# Patient Record
Sex: FEMALE | Race: WHITE | NOT HISPANIC OR LATINO | Employment: UNEMPLOYED | URBAN - METROPOLITAN AREA
[De-identification: names, ages, dates, MRNs, and addresses within clinical notes are randomized per-mention and may not be internally consistent; named-entity substitution may affect disease eponyms.]

---

## 2023-03-14 DIAGNOSIS — Z87.898 HISTORY OF NAUSEA: ICD-10-CM

## 2023-03-14 RX ORDER — PROMETHAZINE HYDROCHLORIDE 25 MG/1
25 TABLET ORAL EVERY 8 HOURS PRN
COMMUNITY
End: 2023-03-14 | Stop reason: SDUPTHER

## 2023-03-14 RX ORDER — PROMETHAZINE HYDROCHLORIDE 25 MG/1
25 TABLET ORAL EVERY 8 HOURS PRN
Qty: 90 TABLET | Refills: 1 | Status: SHIPPED | OUTPATIENT
Start: 2023-03-14 | End: 2023-09-19 | Stop reason: SDUPTHER

## 2023-03-16 DIAGNOSIS — E89.0 HYPOTHYROIDISM, POSTSURGICAL: ICD-10-CM

## 2023-03-16 RX ORDER — LEVOTHYROXINE SODIUM 137 UG/1
137 TABLET ORAL DAILY
Qty: 90 TABLET | Refills: 1 | Status: SHIPPED | OUTPATIENT
Start: 2023-03-16 | End: 2023-09-18 | Stop reason: SDUPTHER

## 2023-03-16 RX ORDER — LEVOTHYROXINE SODIUM 137 UG/1
137 TABLET ORAL DAILY
COMMUNITY
End: 2023-03-16 | Stop reason: SDUPTHER

## 2023-04-04 DIAGNOSIS — G43.709 CHRONIC MIGRAINE WITHOUT AURA WITHOUT STATUS MIGRAINOSUS, NOT INTRACTABLE: ICD-10-CM

## 2023-04-04 RX ORDER — INDOMETHACIN 25 MG/1
25 CAPSULE ORAL
Qty: 60 CAPSULE | Refills: 1 | Status: SHIPPED | OUTPATIENT
Start: 2023-04-04 | End: 2023-05-04

## 2023-04-04 RX ORDER — INDOMETHACIN 25 MG/1
25 CAPSULE ORAL
COMMUNITY
Start: 2023-03-08 | End: 2023-04-04 | Stop reason: SDUPTHER

## 2023-04-13 LAB — THYROTROPIN (MIU/L) IN SER/PLAS BY DETECTION LIMIT <= 0.05 MIU/L: 2.25 MIU/L (ref 0.44–3.98)

## 2023-05-17 DIAGNOSIS — Z87.898 HISTORY OF NAUSEA: ICD-10-CM

## 2023-05-17 RX ORDER — PROMETHAZINE HYDROCHLORIDE 25 MG/1
25 TABLET ORAL EVERY 8 HOURS PRN
COMMUNITY
Start: 2023-04-17 | End: 2023-05-17 | Stop reason: SDUPTHER

## 2023-05-17 RX ORDER — PROMETHAZINE HYDROCHLORIDE 25 MG/1
25 TABLET ORAL EVERY 8 HOURS PRN
Qty: 45 TABLET | Refills: 1 | Status: SHIPPED | OUTPATIENT
Start: 2023-05-17 | End: 2023-06-26 | Stop reason: SDUPTHER

## 2023-06-01 DIAGNOSIS — G89.29 CHRONIC NONINTRACTABLE HEADACHE, UNSPECIFIED HEADACHE TYPE: ICD-10-CM

## 2023-06-01 DIAGNOSIS — R51.9 CHRONIC NONINTRACTABLE HEADACHE, UNSPECIFIED HEADACHE TYPE: ICD-10-CM

## 2023-06-01 RX ORDER — INDOMETHACIN 25 MG/1
25 CAPSULE ORAL
Qty: 60 CAPSULE | Refills: 0 | Status: SHIPPED | OUTPATIENT
Start: 2023-06-01 | End: 2023-07-01

## 2023-06-07 ENCOUNTER — OFFICE VISIT (OUTPATIENT)
Dept: PRIMARY CARE | Facility: CLINIC | Age: 38
End: 2023-06-07
Payer: COMMERCIAL

## 2023-06-07 VITALS
HEIGHT: 63 IN | HEART RATE: 91 BPM | OXYGEN SATURATION: 98 % | WEIGHT: 197.6 LBS | BODY MASS INDEX: 35.01 KG/M2 | SYSTOLIC BLOOD PRESSURE: 116 MMHG | DIASTOLIC BLOOD PRESSURE: 76 MMHG | RESPIRATION RATE: 18 BRPM

## 2023-06-07 DIAGNOSIS — M32.9 SLE EXACERBATION (MULTI): ICD-10-CM

## 2023-06-07 DIAGNOSIS — M32.9: ICD-10-CM

## 2023-06-07 DIAGNOSIS — G43.709 CHRONIC MIGRAINE WITHOUT AURA WITHOUT STATUS MIGRAINOSUS, NOT INTRACTABLE: Primary | ICD-10-CM

## 2023-06-07 PROBLEM — M79.10 MYALGIA: Status: ACTIVE | Noted: 2023-06-07

## 2023-06-07 PROBLEM — R42 DIZZINESS: Status: RESOLVED | Noted: 2023-06-07 | Resolved: 2023-06-07

## 2023-06-07 PROBLEM — M79.89 FOOT SWELLING: Status: RESOLVED | Noted: 2023-06-07 | Resolved: 2023-06-07

## 2023-06-07 PROBLEM — C73 PAPILLARY MICROCARCINOMA OF THYROID (MULTI): Status: ACTIVE | Noted: 2023-06-07

## 2023-06-07 PROBLEM — F32.A DEPRESSION: Status: ACTIVE | Noted: 2023-06-07

## 2023-06-07 PROBLEM — R06.02 EXERTIONAL SHORTNESS OF BREATH: Status: RESOLVED | Noted: 2023-06-07 | Resolved: 2023-06-07

## 2023-06-07 PROBLEM — Z98.890 HISTORY OF THYROID SURGERY: Status: RESOLVED | Noted: 2023-06-07 | Resolved: 2023-06-07

## 2023-06-07 PROBLEM — F41.9 ANXIETY: Status: ACTIVE | Noted: 2023-06-07

## 2023-06-07 PROBLEM — E89.0 HYPOTHYROIDISM, POSTSURGICAL: Status: ACTIVE | Noted: 2023-06-07

## 2023-06-07 PROBLEM — G47.9 SLEEP DISORDER: Status: RESOLVED | Noted: 2023-06-07 | Resolved: 2023-06-07

## 2023-06-07 PROBLEM — N64.4 MASTODYNIA: Status: RESOLVED | Noted: 2023-06-07 | Resolved: 2023-06-07

## 2023-06-07 PROBLEM — R53.83 FATIGUE: Status: RESOLVED | Noted: 2023-06-07 | Resolved: 2023-06-07

## 2023-06-07 PROBLEM — R05.9 COUGH: Status: RESOLVED | Noted: 2023-06-07 | Resolved: 2023-06-07

## 2023-06-07 PROBLEM — M79.89 SWELLING OF BOTH HANDS: Status: RESOLVED | Noted: 2023-06-07 | Resolved: 2023-06-07

## 2023-06-07 PROBLEM — R00.2 PALPITATIONS: Status: ACTIVE | Noted: 2023-06-07

## 2023-06-07 PROCEDURE — 99214 OFFICE O/P EST MOD 30 MIN: CPT | Performed by: NURSE PRACTITIONER

## 2023-06-07 RX ORDER — ALBUTEROL SULFATE 90 UG/1
2 AEROSOL, METERED RESPIRATORY (INHALATION) EVERY 6 HOURS PRN
COMMUNITY
Start: 2014-09-29 | End: 2024-01-02 | Stop reason: ALTCHOICE

## 2023-06-07 RX ORDER — ESCITALOPRAM OXALATE 20 MG/1
20 TABLET ORAL DAILY
COMMUNITY
Start: 2023-06-02 | End: 2023-08-30 | Stop reason: SDUPTHER

## 2023-06-07 RX ORDER — METOPROLOL SUCCINATE 25 MG/1
25 TABLET, EXTENDED RELEASE ORAL DAILY
COMMUNITY
End: 2023-06-15 | Stop reason: SDUPTHER

## 2023-06-07 RX ORDER — FAMOTIDINE 20 MG/1
20 TABLET, FILM COATED ORAL 2 TIMES DAILY
COMMUNITY
End: 2023-07-20 | Stop reason: SDUPTHER

## 2023-06-07 RX ORDER — HYDROXYZINE HYDROCHLORIDE 25 MG/1
1 TABLET, FILM COATED ORAL
COMMUNITY
Start: 2022-11-08 | End: 2024-01-22 | Stop reason: WASHOUT

## 2023-06-07 RX ORDER — IBUPROFEN 800 MG/1
1 TABLET ORAL EVERY 6 HOURS PRN
COMMUNITY
Start: 2023-02-09 | End: 2024-01-02 | Stop reason: ALTCHOICE

## 2023-06-07 RX ORDER — FREMANEZUMAB-VFRM 225 MG/1.5ML
225 INJECTION SUBCUTANEOUS
COMMUNITY
Start: 2023-06-06

## 2023-06-07 RX ORDER — HYDROCODONE BITARTRATE AND ACETAMINOPHEN 5; 325 MG/1; MG/1
1 TABLET ORAL EVERY 6 HOURS PRN
Qty: 12 TABLET | Refills: 0 | Status: SHIPPED | OUTPATIENT
Start: 2023-06-07 | End: 2023-06-10

## 2023-06-07 RX ORDER — PNV NO.95/FERROUS FUM/FOLIC AC 28MG-0.8MG
100 TABLET ORAL DAILY
COMMUNITY

## 2023-06-07 RX ORDER — TIZANIDINE 2 MG/1
TABLET ORAL
COMMUNITY
Start: 2023-05-31 | End: 2024-01-22 | Stop reason: WASHOUT

## 2023-06-07 RX ORDER — MELATONIN 10 MG
1 CAPSULE ORAL NIGHTLY
COMMUNITY

## 2023-06-07 RX ORDER — CALCITRIOL 0.25 UG/1
0.25 CAPSULE ORAL DAILY
COMMUNITY
End: 2024-01-02 | Stop reason: ALTCHOICE

## 2023-06-07 RX ORDER — PREDNISONE 10 MG/1
TABLET ORAL
Qty: 42 TABLET | Refills: 0 | Status: SHIPPED | OUTPATIENT
Start: 2023-06-07 | End: 2023-06-19

## 2023-06-07 ASSESSMENT — ENCOUNTER SYMPTOMS: PAIN: 1

## 2023-06-07 NOTE — PATIENT INSTRUCTIONS
Take the steroids for your flare up    Follow up with rheumatology to discuss treatment options    I [facility] to see if we can get it covered - may have to go through your neurologist     I sent in a small supply of norco to help with your pain - you have taken this prior

## 2023-06-07 NOTE — ASSESSMENT & PLAN NOTE
Following neurology  Continue Mando  Sent in refill for Nurtec but I did discuss with her she may need to contact her neurologist as she is the one who manages this and she may need prior approval to have it covered

## 2023-06-07 NOTE — ASSESSMENT & PLAN NOTE
Current exacerbation  Prednisone burst taper pack  Agreed to give patient a very small 3-day supply of Norco to help with her pain  OARRS reviewed  Norco 1 tab every 6 hours as needed for pain - 3 day supply sent in

## 2023-06-07 NOTE — PROGRESS NOTES
"Subjective   Patient ID: Zuleyma Farrell is a 38 y.o. female who presents for Pain (Patient in today to discuss pain; states that she has an appointment with Rheumatology in July to discuss auto immune disorder, but would like to discuss treatment options for the time being.  ).    Has an appt. With rheumatology in July for her pain and SLE    Has had an increase in back pain due to her lupus - has  a current flare now.  She has not been sleeping.  She states the pain does not change whether she stands, sits, or lays down.  She states the pain is in her lower back and lower cervical region.  She has tried a heating pad and indomethacin without relief she denies changes in bowel or bladder habits or numbness and tingling in bilateral lower extremities    Has been following [facility] and will be for her migraines.  She is taking Ajovy which has helped decrease her migraines greatly.  She will need to take a Nurtec approximately 2-3 times a month for her breakthrough migraine and would like a refill    Takes metoprolol once daily for her tachycardia - will need refills and will need refills in the next month         Review of Systems   All other systems reviewed and are negative.      Objective   /76   Pulse 91   Resp 18   Ht 1.6 m (5' 3\")   Wt 89.6 kg (197 lb 9.6 oz)   SpO2 98%   BMI 35.00 kg/m²     Physical Exam  Vitals and nursing note reviewed.   Constitutional:       General: She is not in acute distress.     Appearance: Normal appearance.   HENT:      Head: Normocephalic and atraumatic.      Right Ear: External ear normal.      Left Ear: External ear normal.      Nose: Nose normal.      Mouth/Throat:      Mouth: Mucous membranes are moist.      Pharynx: Oropharynx is clear.   Eyes:      Extraocular Movements: Extraocular movements intact.      Conjunctiva/sclera: Conjunctivae normal.      Pupils: Pupils are equal, round, and reactive to light.   Pulmonary:      Effort: Pulmonary effort " is normal.      Breath sounds: Normal breath sounds.   Musculoskeletal:         General: Tenderness (Lower cervical and lower lumbar regions of tenderness on palpation) present.      Right lower leg: No edema.      Left lower leg: No edema.   Skin:     General: Skin is warm and dry.   Neurological:      General: No focal deficit present.      Mental Status: She is alert and oriented to person, place, and time.   Psychiatric:         Mood and Affect: Mood normal.         Behavior: Behavior normal.         Thought Content: Thought content normal.         Judgment: Judgment normal.         Assessment/Plan   Problem List Items Addressed This Visit          Other    Subacute systemic lupus erythematosus     Will establish with rheumatology in July to discuss medication management         Chronic migraine without aura without status migrainosus, not intractable - Primary     Following neurology  Continue Mando  Sent in refill for Nurtec but I did discuss with her she may need to contact her neurologist as she is the one who manages this and she may need prior approval to have it covered         Relevant Medications    rimegepant (NURTEC) 75 mg tablet,disintegrating    SLE exacerbation (CMS/HCC)     Current exacerbation  Prednisone burst taper pack  Agreed to give patient a very small 3-day supply of Norco to help with her pain  OARRS reviewed  Norco 1 tab every 6 hours as needed for pain - 3 day supply sent in         Relevant Medications    predniSONE (Deltasone) 10 mg tablet    HYDROcodone-acetaminophen (Norco) 5-325 mg tablet

## 2023-06-15 DIAGNOSIS — R00.2 PALPITATIONS: ICD-10-CM

## 2023-06-15 RX ORDER — METOPROLOL SUCCINATE 25 MG/1
25 TABLET, EXTENDED RELEASE ORAL DAILY
Qty: 90 TABLET | Refills: 0 | Status: SHIPPED | OUTPATIENT
Start: 2023-06-15 | End: 2023-09-18 | Stop reason: SDUPTHER

## 2023-06-26 ENCOUNTER — TELEPHONE (OUTPATIENT)
Dept: PRIMARY CARE | Facility: CLINIC | Age: 38
End: 2023-06-26
Payer: COMMERCIAL

## 2023-06-26 DIAGNOSIS — Z87.898 HISTORY OF NAUSEA: ICD-10-CM

## 2023-06-26 RX ORDER — PROMETHAZINE HYDROCHLORIDE 25 MG/1
25 TABLET ORAL EVERY 8 HOURS PRN
Qty: 45 TABLET | Refills: 1 | Status: SHIPPED | OUTPATIENT
Start: 2023-06-26 | End: 2023-07-11

## 2023-07-20 DIAGNOSIS — Z87.898 HX OF ABDOMINAL PAIN: ICD-10-CM

## 2023-07-20 RX ORDER — FAMOTIDINE 20 MG/1
20 TABLET, FILM COATED ORAL 2 TIMES DAILY
Qty: 180 TABLET | Refills: 1 | Status: SHIPPED | OUTPATIENT
Start: 2023-07-20 | End: 2024-01-02 | Stop reason: SDUPTHER

## 2023-08-09 DIAGNOSIS — Z87.898 HISTORY OF NAUSEA: ICD-10-CM

## 2023-08-09 RX ORDER — PROMETHAZINE HYDROCHLORIDE 25 MG/1
25 TABLET ORAL
COMMUNITY
Start: 2023-07-15 | End: 2023-08-09 | Stop reason: SDUPTHER

## 2023-08-09 RX ORDER — PROMETHAZINE HYDROCHLORIDE 25 MG/1
25 TABLET ORAL EVERY 8 HOURS PRN
Qty: 90 TABLET | Refills: 0 | Status: SHIPPED | OUTPATIENT
Start: 2023-08-09 | End: 2023-09-08

## 2023-08-30 DIAGNOSIS — F41.9 ANXIETY: ICD-10-CM

## 2023-08-30 RX ORDER — ESCITALOPRAM OXALATE 20 MG/1
20 TABLET ORAL DAILY
Qty: 90 TABLET | Refills: 0 | Status: SHIPPED | OUTPATIENT
Start: 2023-08-30 | End: 2023-12-11 | Stop reason: SDUPTHER

## 2023-09-18 DIAGNOSIS — R00.2 PALPITATIONS: ICD-10-CM

## 2023-09-18 DIAGNOSIS — E89.0 HYPOTHYROIDISM, POSTSURGICAL: ICD-10-CM

## 2023-09-18 RX ORDER — METOPROLOL SUCCINATE 25 MG/1
25 TABLET, EXTENDED RELEASE ORAL DAILY
Qty: 90 TABLET | Refills: 0 | Status: SHIPPED | OUTPATIENT
Start: 2023-09-18 | End: 2023-12-11 | Stop reason: SDUPTHER

## 2023-09-18 RX ORDER — LEVOTHYROXINE SODIUM 137 UG/1
137 TABLET ORAL DAILY
Qty: 90 TABLET | Refills: 0 | Status: SHIPPED | OUTPATIENT
Start: 2023-09-18 | End: 2023-12-12 | Stop reason: SDUPTHER

## 2023-09-19 DIAGNOSIS — Z87.898 HISTORY OF NAUSEA: ICD-10-CM

## 2023-09-19 RX ORDER — PROMETHAZINE HYDROCHLORIDE 25 MG/1
25 TABLET ORAL EVERY 8 HOURS PRN
Qty: 90 TABLET | Refills: 1 | Status: SHIPPED | OUTPATIENT
Start: 2023-09-19 | End: 2023-11-14 | Stop reason: SDUPTHER

## 2023-10-05 ENCOUNTER — TELEPHONE (OUTPATIENT)
Dept: RHEUMATOLOGY | Facility: CLINIC | Age: 38
End: 2023-10-05
Payer: COMMERCIAL

## 2023-10-05 DIAGNOSIS — B37.0 THRUSH: Primary | ICD-10-CM

## 2023-10-05 NOTE — TELEPHONE ENCOUNTER
"PT CALLED, STATES SHE STARTED methotrexate ON FRIDAY & NOW BELIEVES SHE HAS THRUSH. ASKING FOR \"SWISH STUFF\"  "

## 2023-10-06 RX ORDER — FLUCONAZOLE 150 MG/1
150 TABLET ORAL
Qty: 12 TABLET | Refills: 2 | Status: SHIPPED | OUTPATIENT
Start: 2023-10-06 | End: 2023-11-05

## 2023-10-06 NOTE — TELEPHONE ENCOUNTER
Hold methotrexate for 1 week, take diflucan as directed until thrush is resolved and then resume methotrexate. Continue folic acid.

## 2023-10-11 ENCOUNTER — TELEPHONE (OUTPATIENT)
Dept: RHEUMATOLOGY | Facility: CLINIC | Age: 38
End: 2023-10-11
Payer: COMMERCIAL

## 2023-10-11 NOTE — TELEPHONE ENCOUNTER
"PT LEFT VM, STATES YOU ORDERED VIT D WEEKLY & FOLIC ACID DAILY. PCP ALSO ORDERED VIT B. NOW PT ASKING IF SHE CAN TAKE  A MULTIVITAMIN, OR STOP OTHER MEDS, OR \"FORGET ABOUT\" MULTIVITAMIN. PLEASE ADVISE  "

## 2023-10-21 ENCOUNTER — APPOINTMENT (OUTPATIENT)
Dept: RADIOLOGY | Facility: HOSPITAL | Age: 38
End: 2023-10-21
Payer: COMMERCIAL

## 2023-10-21 ENCOUNTER — HOSPITAL ENCOUNTER (EMERGENCY)
Facility: HOSPITAL | Age: 38
Discharge: HOME | End: 2023-10-21
Attending: STUDENT IN AN ORGANIZED HEALTH CARE EDUCATION/TRAINING PROGRAM
Payer: COMMERCIAL

## 2023-10-21 ENCOUNTER — HOSPITAL ENCOUNTER (EMERGENCY)
Facility: HOSPITAL | Age: 38
Discharge: HOME | End: 2023-10-21
Attending: EMERGENCY MEDICINE
Payer: COMMERCIAL

## 2023-10-21 VITALS
RESPIRATION RATE: 18 BRPM | SYSTOLIC BLOOD PRESSURE: 171 MMHG | TEMPERATURE: 98.1 F | OXYGEN SATURATION: 98 % | HEIGHT: 63 IN | DIASTOLIC BLOOD PRESSURE: 103 MMHG | WEIGHT: 202.6 LBS | HEART RATE: 101 BPM | BODY MASS INDEX: 35.9 KG/M2

## 2023-10-21 VITALS
WEIGHT: 197.09 LBS | SYSTOLIC BLOOD PRESSURE: 145 MMHG | OXYGEN SATURATION: 98 % | HEIGHT: 63 IN | BODY MASS INDEX: 34.92 KG/M2 | DIASTOLIC BLOOD PRESSURE: 91 MMHG | RESPIRATION RATE: 16 BRPM | HEART RATE: 95 BPM | TEMPERATURE: 97.9 F

## 2023-10-21 DIAGNOSIS — R10.10 PAIN OF UPPER ABDOMEN: Primary | ICD-10-CM

## 2023-10-21 DIAGNOSIS — R10.13 ABDOMINAL PAIN, EPIGASTRIC: Primary | ICD-10-CM

## 2023-10-21 LAB
ALBUMIN SERPL-MCNC: 4.3 G/DL (ref 3.5–5)
ALP BLD-CCNC: 49 U/L (ref 35–125)
ALT SERPL-CCNC: 19 U/L (ref 5–40)
ANION GAP SERPL CALC-SCNC: 10 MMOL/L
APPEARANCE UR: CLEAR
AST SERPL-CCNC: 12 U/L (ref 5–40)
BASOPHILS # BLD AUTO: 0.02 X10*3/UL (ref 0–0.1)
BASOPHILS NFR BLD AUTO: 0.3 %
BILIRUB SERPL-MCNC: 0.2 MG/DL (ref 0.1–1.2)
BILIRUB UR STRIP.AUTO-MCNC: NEGATIVE MG/DL
BUN SERPL-MCNC: 14 MG/DL (ref 8–25)
CALCIUM SERPL-MCNC: 9.7 MG/DL (ref 8.5–10.4)
CHLORIDE SERPL-SCNC: 101 MMOL/L (ref 97–107)
CO2 SERPL-SCNC: 24 MMOL/L (ref 24–31)
COLOR UR: NORMAL
CREAT SERPL-MCNC: 0.7 MG/DL (ref 0.4–1.6)
EOSINOPHIL # BLD AUTO: 0.09 X10*3/UL (ref 0–0.7)
EOSINOPHIL NFR BLD AUTO: 1.2 %
ERYTHROCYTE [DISTWIDTH] IN BLOOD BY AUTOMATED COUNT: 13.8 % (ref 11.5–14.5)
GFR SERPL CREATININE-BSD FRML MDRD: >90 ML/MIN/1.73M*2
GLUCOSE SERPL-MCNC: 103 MG/DL (ref 65–99)
GLUCOSE UR STRIP.AUTO-MCNC: NORMAL MG/DL
HCG UR QL IA.RAPID: NEGATIVE
HCT VFR BLD AUTO: 38.4 % (ref 36–46)
HGB BLD-MCNC: 13.3 G/DL (ref 12–16)
HOLD SPECIMEN: NORMAL
IMM GRANULOCYTES # BLD AUTO: 0.02 X10*3/UL (ref 0–0.7)
IMM GRANULOCYTES NFR BLD AUTO: 0.3 % (ref 0–0.9)
KETONES UR STRIP.AUTO-MCNC: NEGATIVE MG/DL
LEUKOCYTE ESTERASE UR QL STRIP.AUTO: NEGATIVE
LIPASE SERPL-CCNC: 17 U/L (ref 16–63)
LYMPHOCYTES # BLD AUTO: 2.8 X10*3/UL (ref 1.2–4.8)
LYMPHOCYTES NFR BLD AUTO: 38 %
MCH RBC QN AUTO: 33.8 PG (ref 26–34)
MCHC RBC AUTO-ENTMCNC: 34.6 G/DL (ref 32–36)
MCV RBC AUTO: 98 FL (ref 80–100)
MONOCYTES # BLD AUTO: 0.45 X10*3/UL (ref 0.1–1)
MONOCYTES NFR BLD AUTO: 6.1 %
NEUTROPHILS # BLD AUTO: 3.98 X10*3/UL (ref 1.2–7.7)
NEUTROPHILS NFR BLD AUTO: 54.1 %
NITRITE UR QL STRIP.AUTO: NEGATIVE
NRBC BLD-RTO: 0 /100 WBCS (ref 0–0)
PH UR STRIP.AUTO: 6 [PH]
PLATELET # BLD AUTO: 318 X10*3/UL (ref 150–450)
PMV BLD AUTO: 9.4 FL (ref 7.5–11.5)
POTASSIUM SERPL-SCNC: 3.5 MMOL/L (ref 3.4–5.1)
PROT SERPL-MCNC: 7.1 G/DL (ref 5.9–7.9)
PROT UR STRIP.AUTO-MCNC: NEGATIVE MG/DL
RBC # BLD AUTO: 3.94 X10*6/UL (ref 4–5.2)
RBC # UR STRIP.AUTO: NEGATIVE /UL
SODIUM SERPL-SCNC: 135 MMOL/L (ref 133–145)
SP GR UR STRIP.AUTO: 1.02
UROBILINOGEN UR STRIP.AUTO-MCNC: NORMAL MG/DL
WBC # BLD AUTO: 7.4 X10*3/UL (ref 4.4–11.3)

## 2023-10-21 PROCEDURE — 2500000004 HC RX 250 GENERAL PHARMACY W/ HCPCS (ALT 636 FOR OP/ED): Performed by: STUDENT IN AN ORGANIZED HEALTH CARE EDUCATION/TRAINING PROGRAM

## 2023-10-21 PROCEDURE — 99283 EMERGENCY DEPT VISIT LOW MDM: CPT | Performed by: EMERGENCY MEDICINE

## 2023-10-21 PROCEDURE — 80048 BASIC METABOLIC PNL TOTAL CA: CPT | Performed by: STUDENT IN AN ORGANIZED HEALTH CARE EDUCATION/TRAINING PROGRAM

## 2023-10-21 PROCEDURE — 81003 URINALYSIS AUTO W/O SCOPE: CPT | Performed by: STUDENT IN AN ORGANIZED HEALTH CARE EDUCATION/TRAINING PROGRAM

## 2023-10-21 PROCEDURE — 99284 EMERGENCY DEPT VISIT MOD MDM: CPT | Mod: 25

## 2023-10-21 PROCEDURE — 96361 HYDRATE IV INFUSION ADD-ON: CPT

## 2023-10-21 PROCEDURE — 2500000001 HC RX 250 WO HCPCS SELF ADMINISTERED DRUGS (ALT 637 FOR MEDICARE OP): Performed by: EMERGENCY MEDICINE

## 2023-10-21 PROCEDURE — 74177 CT ABD & PELVIS W/CONTRAST: CPT

## 2023-10-21 PROCEDURE — 83690 ASSAY OF LIPASE: CPT | Performed by: STUDENT IN AN ORGANIZED HEALTH CARE EDUCATION/TRAINING PROGRAM

## 2023-10-21 PROCEDURE — 85025 COMPLETE CBC W/AUTO DIFF WBC: CPT | Performed by: STUDENT IN AN ORGANIZED HEALTH CARE EDUCATION/TRAINING PROGRAM

## 2023-10-21 PROCEDURE — 36415 COLL VENOUS BLD VENIPUNCTURE: CPT | Performed by: STUDENT IN AN ORGANIZED HEALTH CARE EDUCATION/TRAINING PROGRAM

## 2023-10-21 PROCEDURE — 96374 THER/PROPH/DIAG INJ IV PUSH: CPT | Mod: XU

## 2023-10-21 PROCEDURE — 96375 TX/PRO/DX INJ NEW DRUG ADDON: CPT

## 2023-10-21 PROCEDURE — 84075 ASSAY ALKALINE PHOSPHATASE: CPT | Performed by: STUDENT IN AN ORGANIZED HEALTH CARE EDUCATION/TRAINING PROGRAM

## 2023-10-21 PROCEDURE — 81025 URINE PREGNANCY TEST: CPT | Performed by: STUDENT IN AN ORGANIZED HEALTH CARE EDUCATION/TRAINING PROGRAM

## 2023-10-21 PROCEDURE — 2550000001 HC RX 255 CONTRASTS: Performed by: STUDENT IN AN ORGANIZED HEALTH CARE EDUCATION/TRAINING PROGRAM

## 2023-10-21 RX ORDER — ALUMINUM HYDROXIDE, MAGNESIUM HYDROXIDE, AND SIMETHICONE 1200; 120; 1200 MG/30ML; MG/30ML; MG/30ML
30 SUSPENSION ORAL ONCE
Status: COMPLETED | OUTPATIENT
Start: 2023-10-21 | End: 2023-10-21

## 2023-10-21 RX ORDER — MORPHINE SULFATE 4 MG/ML
4 INJECTION, SOLUTION INTRAMUSCULAR; INTRAVENOUS ONCE
Status: COMPLETED | OUTPATIENT
Start: 2023-10-21 | End: 2023-10-21

## 2023-10-21 RX ORDER — OXYCODONE AND ACETAMINOPHEN 5; 325 MG/1; MG/1
1 TABLET ORAL ONCE
Status: COMPLETED | OUTPATIENT
Start: 2023-10-21 | End: 2023-10-21

## 2023-10-21 RX ORDER — ALUMINUM HYDROXIDE, MAGNESIUM HYDROXIDE, AND SIMETHICONE 1200; 120; 1200 MG/30ML; MG/30ML; MG/30ML
5 SUSPENSION ORAL EVERY 6 HOURS PRN
Qty: 355 ML | Refills: 0 | Status: SHIPPED | OUTPATIENT
Start: 2023-10-21 | End: 2023-10-31

## 2023-10-21 RX ORDER — DICYCLOMINE HYDROCHLORIDE 10 MG/1
10 CAPSULE ORAL ONCE
Status: COMPLETED | OUTPATIENT
Start: 2023-10-21 | End: 2023-10-21

## 2023-10-21 RX ORDER — ONDANSETRON HYDROCHLORIDE 2 MG/ML
4 INJECTION, SOLUTION INTRAVENOUS ONCE
Status: COMPLETED | OUTPATIENT
Start: 2023-10-21 | End: 2023-10-21

## 2023-10-21 RX ORDER — DICYCLOMINE HYDROCHLORIDE 20 MG/1
20 TABLET ORAL 2 TIMES DAILY
Qty: 20 TABLET | Refills: 0 | Status: SHIPPED | OUTPATIENT
Start: 2023-10-21 | End: 2023-10-31

## 2023-10-21 RX ADMIN — ALUMINUM HYDROXIDE, MAGNESIUM HYDROXIDE, AND SIMETHICONE 30 ML: 200; 200; 20 SUSPENSION ORAL at 15:46

## 2023-10-21 RX ADMIN — MORPHINE SULFATE 4 MG: 4 INJECTION, SOLUTION INTRAMUSCULAR; INTRAVENOUS at 06:38

## 2023-10-21 RX ADMIN — DICYCLOMINE HYDROCHLORIDE 10 MG: 10 CAPSULE ORAL at 15:46

## 2023-10-21 RX ADMIN — OXYCODONE AND ACETAMINOPHEN 1 TABLET: 5; 325 TABLET ORAL at 15:46

## 2023-10-21 RX ADMIN — SODIUM CHLORIDE 1000 ML: 9 INJECTION, SOLUTION INTRAVENOUS at 04:40

## 2023-10-21 RX ADMIN — ONDANSETRON 4 MG: 2 INJECTION INTRAMUSCULAR; INTRAVENOUS at 04:44

## 2023-10-21 RX ADMIN — IOHEXOL 75 ML: 350 INJECTION, SOLUTION INTRAVENOUS at 05:33

## 2023-10-21 ASSESSMENT — PAIN DESCRIPTION - DESCRIPTORS
DESCRIPTORS: PRESSURE

## 2023-10-21 ASSESSMENT — COLUMBIA-SUICIDE SEVERITY RATING SCALE - C-SSRS
2. HAVE YOU ACTUALLY HAD ANY THOUGHTS OF KILLING YOURSELF?: NO
6. HAVE YOU EVER DONE ANYTHING, STARTED TO DO ANYTHING, OR PREPARED TO DO ANYTHING TO END YOUR LIFE?: NO
6. HAVE YOU EVER DONE ANYTHING, STARTED TO DO ANYTHING, OR PREPARED TO DO ANYTHING TO END YOUR LIFE?: NO
1. IN THE PAST MONTH, HAVE YOU WISHED YOU WERE DEAD OR WISHED YOU COULD GO TO SLEEP AND NOT WAKE UP?: NO
2. HAVE YOU ACTUALLY HAD ANY THOUGHTS OF KILLING YOURSELF?: NO
1. IN THE PAST MONTH, HAVE YOU WISHED YOU WERE DEAD OR WISHED YOU COULD GO TO SLEEP AND NOT WAKE UP?: NO

## 2023-10-21 ASSESSMENT — PAIN DESCRIPTION - ONSET
ONSET: ONGOING
ONSET: SUDDEN

## 2023-10-21 ASSESSMENT — PAIN DESCRIPTION - ORIENTATION
ORIENTATION: LEFT
ORIENTATION: LEFT;MID

## 2023-10-21 ASSESSMENT — PAIN DESCRIPTION - FREQUENCY
FREQUENCY: CONSTANT/CONTINUOUS
FREQUENCY: CONSTANT/CONTINUOUS

## 2023-10-21 ASSESSMENT — PAIN - FUNCTIONAL ASSESSMENT
PAIN_FUNCTIONAL_ASSESSMENT: 0-10

## 2023-10-21 ASSESSMENT — PAIN SCALES - GENERAL
PAINLEVEL_OUTOF10: 0 - NO PAIN
PAINLEVEL_OUTOF10: 5 - MODERATE PAIN
PAINLEVEL_OUTOF10: 5 - MODERATE PAIN
PAINLEVEL_OUTOF10: 0 - NO PAIN

## 2023-10-21 ASSESSMENT — PAIN DESCRIPTION - PAIN TYPE
TYPE: ACUTE PAIN
TYPE: ACUTE PAIN

## 2023-10-21 ASSESSMENT — PAIN DESCRIPTION - LOCATION
LOCATION: ABDOMEN
LOCATION: ABDOMEN

## 2023-10-21 ASSESSMENT — PAIN DESCRIPTION - PROGRESSION
CLINICAL_PROGRESSION: GRADUALLY WORSENING
CLINICAL_PROGRESSION: NOT CHANGED

## 2023-10-21 NOTE — ED PROVIDER NOTES
EMERGENCY DEPARTMENT ENCOUNTER      [ ] CODE STEMI [ ] CODE Neuro [ ] CODE Yellow [ ] Modified Trauma [ ] CODE Blue      CHIEF COMPLAINT      Chief Complaint   Patient presents with    Abdominal Pain     I have abd pain in my llq and goes over to my belly button the pain is pressure and bloating I have nausea       Mode of Arrival:Car  Primary Care Provider: HAN Campo  Medical Record Number: [id number]      History obtained by: Patient  Limited by nothing  Time seen: 4:19 PM    QUALITY MEASURES   PPE Utilized: N95 with goggles and gloves      HPI      Zuleyma Farrell is a 38 y.o. female with a history of recent ED visit for abdominal pain with full lab work and CT scan only showing left 4 cm ovarian cyst, prescribed Pepcid and advised to follow-up with GI doctor comes back to the emergency room stating that she still having the same left upper quadrant pain despite using Pepcid, with the pain not necessarily worsening but not sure what to do regarding the pain.  Denies any nausea vomiting or diarrhea.  Denies any new migration of the pain elsewhere in the body only stating that the left upper quadrant pain occasionally radiates to the periumbilical is but states that this occurred earlier in the morning as well.  Given that it is a Saturday today was not able to call the GI doctor or primary care doctor so decided to come in requesting medication to help with pain.  States that is primarily of pressure and bloating sensation.  Did have a bowel movement that was normal yesterday x2.  Did not eat anything abnormal and has been able to tolerate food today.  No other complaints.      Patient otherwise denies fever, chills,  v/d, chest pain, shortness of breath, sore throat, cough, rhinorrhea,  dysuria, hematuria, swollen extremities or skin changes, hematemesis, hematochezia, melena or any other accompanying symptoms of late.      The patient has no other complaints at this time.               PAST  MEDICAL HISTORY    Past Medical History:   Diagnosis Date    Acute frontal sinusitis, unspecified 10/23/2017    Acute frontal sinusitis    Acute pharyngitis due to other specified organisms 10/23/2017    Acute pharyngitis due to other specified organisms    Cough 06/07/2023    Encounter for other preprocedural examination 03/07/2016    Preop testing    Exertional shortness of breath 06/07/2023    Foot swelling 06/07/2023    Mastodynia 06/07/2023    Other chest pain 01/10/2014    Atypical chest pain    Other conditions influencing health status     Screening for malignant neoplasms, colon    Other conditions influencing health status 04/18/2016    Skin Lesion    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    Other muscle spasm 02/19/2016    Spasm of cervical paraspinous muscle    Pain in unspecified ankle and joints of unspecified foot 01/27/2015    Ankle joint pain    Pain in unspecified limb 07/29/2016    Limb pain    Pain in unspecified upper arm 07/29/2016    Pain in axilla    Personal history of diseases of the skin and subcutaneous tissue 02/20/2014    History of urticaria    Personal history of gestational diabetes     History of gestational diabetes    Personal history of other diseases of the musculoskeletal system and connective tissue 12/31/2013    History of low back pain    Personal history of other diseases of the nervous system and sense organs 02/19/2016    History of tension headache    Personal history of other diseases of the respiratory system 02/11/2014    History of sinusitis    Personal history of other diseases of the respiratory system 09/30/2013    History of acute sinusitis    Personal history of other diseases of the respiratory system 10/23/2017    History of sore throat    Personal history of other diseases of the respiratory system 10/23/2017    History of acute bronchitis    Personal history of other diseases of the respiratory system 03/02/2017     History of acute pharyngitis    Personal history of other endocrine, nutritional and metabolic disease 2015    History of vitamin D deficiency    Personal history of other endocrine, nutritional and metabolic disease 2016    History of Hashimoto thyroiditis    Personal history of other endocrine, nutritional and metabolic disease 2016    History of hypokalemia    Personal history of other infectious and parasitic diseases 2013    History of scabies    Personal history of other infectious and parasitic diseases 2016    History of herpes zoster    Personal history of other infectious and parasitic diseases 2016    History of herpes zoster    Personal history of other specified conditions 2014    History of urinary frequency    Personal history of other specified conditions 2021    History of abdominal pain    Personal history of other specified conditions 2021    History of nausea    Personal history of urinary (tract) infections 2014    History of urinary tract infection    Right upper quadrant pain 2013    Abdominal pain, RUQ (right upper quadrant)    Right upper quadrant pain 2013    Abdominal pain, RUQ (right upper quadrant)    Sleep disorder 2023    Strain of muscle, fascia and tendon at neck level, initial encounter 2016    Cervical strain    Swelling of both hands 2023    Tachycardia          I have personally reviewed the patient's past medical history in the records.  Kg Gonzalez MD    SURGICAL HISTORY    Past Surgical History:   Procedure Laterality Date     SECTION, CLASSIC  2013     Section    CHOLECYSTECTOMY  2013    Cholecystectomy Laparoscopic    GALLBLADDER SURGERY  2016    Gallbladder Surgery    OTHER SURGICAL HISTORY  06/15/2021    Bladder surgery    TOTAL THYROIDECTOMY  2016    Thyroid Surgery Total Thyroidectomy    TUBAL LIGATION  04/10/2013    Tubal Ligation       I  "have personally reviewed the patient's past surgical history in the records.  Kg Gonzalez MD    CURRENT MEDICATIONS    I have reviewed the patient’s medications.   Please see nursing and pharmacy records for the most up to date list.     [unfilled]    ALLERGIES    Allergies   Allergen Reactions    Hydroxychloroquine Other     \"suicidal ideation\"    Caffeine Other    Hydromorphone Other    Penicillins Other    Sulfa (Sulfonamide Antibiotics) Other    Adhesive Rash    Omeprazole Rash       I have personally reviewed the patient's past history of allergies in the records.  Kg Gonzalez MD    FAMILY HISTORY    No family history on file.    I have personally reviewed the patient's family history in the records.  Kg Gonzalez MD    SOCIAL HISTORY    Social History     Socioeconomic History    Marital status:      Spouse name: Not on file    Number of children: Not on file    Years of education: Not on file    Highest education level: Not on file   Occupational History    Not on file   Tobacco Use    Smoking status: Every Day     Types: Cigarettes    Smokeless tobacco: Never   Vaping Use    Vaping Use: Never used   Substance and Sexual Activity    Alcohol use: Not Currently    Drug use: Never    Sexual activity: Not on file   Other Topics Concern    Not on file   Social History Narrative    Not on file     Social Determinants of Health     Financial Resource Strain: Not on file   Food Insecurity: Not on file   Transportation Needs: Not on file   Physical Activity: Not on file   Stress: Not on file   Social Connections: Not on file   Intimate Partner Violence: Not on file   Housing Stability: Not on file         I have personally reviewed the patient's social history in the records.  Kg Gonzalez MD    REVIEW OF SYSTEMS      14 point ROS was reviewed and negative except as noted above in HPI.      PHYSICAL EXAM    VITAL SIGNS:    BP (!) 171/103 (BP Location: Left arm, Patient Position: Sitting)   Pulse 101   Temp 36.7 " "°C (98.1 °F) (Oral)   Resp 18   Ht 1.6 m (5' 3\")   Wt 91.9 kg (202 lb 9.6 oz)   SpO2 98%   BMI 35.89 kg/m²    Review EMR for vital signs  Nursing note and vitals reviewed.    Constitutional:  Alert and oriented, well-developed, well-nourished, appears stated age, non-toxic appearing  HENT:  Normocephalic, atraumatic, bilateral external ears normal, oropharynx moist, Nose normal.  Neck: normal range of motion, no tenderness, supple, no stridor.  Eyes:  PERRL, EOMI, conjunctiva normal, no discharge.   Cardiovascular:  Normal heart rate, normal rhythm, no murmurs, no rubs, no gallops.   Respiratory:  Normal breath sounds, no respiratory distress, no wheezing, no chest wall tenderness.   GI:  Bowel sounds normal, soft, no tenderness, no rebound or guarding, no distention, no masses pulsatile or otherwise   (any female  exam was done with female chaperone present):   Deferred  Integument:  Warm, dry, no erythema, no rash, no edema.   Back:  No midline tenderness, no CVA tenderness.   Musculoskeletal:  Intact distal pulses, no tenderness, no cyanosis, no clubbing, with capillary refill less than 2 seconds. Good range of motion in all major joints. No tenderness to palpation or major deformities noted.    Neurologic:  Alert & oriented x 3, normal motor function, normal sensory function, no focal deficits noted. Cranial nerves II-XII intact.  Psychiatric:  Affect normal, judgment normal, mood normal.     EKG  None            Reviewed and interpreted by me, Kg Gonzalez MD           RADIOLOGY  No orders to display       All Imaging studies evaluated and interpreted by ED physician except when noted otherwise.    ED PROVIDER INTERPRETATION (XRAYS ONLY):       *I have interpreted the x-ray real-time in the ED myself, and made a clinical decision on it prior to the formal radiology reading.    Kg Gonzalez M.D.    RADIOLOGIST IMPRESSION (U/S, CT, MRI):   No orders to display         PERTINENT LABS    Please refer to the " chart for all lab work and to MDM for relevant discussion.      PROCEDURE    None (procdoc)    ED COURSE & MEDICAL DECISION MAKING    Pertinent Labs & Imaging studies reviewed. (See chart for details)    MDM:    Assessment: Zuleyma Farrell is a 38 y.o.female who presents to the ED with continued abdominal pain despite recent visit but reviewed records and noted that CBC chemistry hepatic function panel lipase were all within normal notes with hCG negative and CT scan ultimately only showing the left 4 cm ovarian cyst not likely the cause of the pain and on physical exam also not noted to have any tenderness so told patient that given the bloating sensation and also given that patient had been told that she developed thrush from methotrexate explained that this could be a side effect of the methotrexate itself and that she started to take it 2 days ago so told patient that I would try a combination of Maalox Bentyl and a one-time Percocet in the ED and will reassess and if patient improves will provide Bentyl and Maalox with simethicone for home but again would encourage GI follow-up.  Patient understands and agrees with plan.  Vital signs stable.        Prior records in EPIC reviewed by me.     2023 Coding Requirements:  --Independent historian(s):    see HPI  --Review of prior records:    EHR reviewed   --Relevant comorbidities:    see records  --Social determinants of health:       MALE ABDOMINAL PAIN  I have considered the following conditions during   my assessment of this patient: Abdominal pain, flank pain, vomiting, diarrhea,   gastritis, colitis, ulcer, abdominal aortic aneurysm, acute coronary syndrome ,   myocardial infarction acute, appendicitis, bowel obstruction, hernia, cholecystitis,   Clostridium difficile associated disease, diverticulitis, ischemic colitis, mesenteric   ischemia, pancreatitis, pyelonephritis, sepsis, testicular torsion, ureterolithiasis,   UTI.      Patient dramatically improved  "after medications and agrees to discharge with a trial of above medications and strict return precautions.        ED VITALS  Vitals:    10/21/23 1504   BP: (!) 171/103   BP Location: Left arm   Patient Position: Sitting   Pulse: 101   Resp: 18   Temp: 36.7 °C (98.1 °F)   TempSrc: Oral   SpO2: 98%   Weight: 91.9 kg (202 lb 9.6 oz)   Height: 1.6 m (5' 3\")       BP  Min: 145/91  Max: 171/103    As part of the 2022 [facility] reporting requirements, the following measures have been reviewed and documented:    None     1. Abdominal pain, epigastric          DISPOSITION       DISCHARGE.  The patient is discharged back to their place of residence.  Discharge diagnosis, instructions and plan were discussed and understood. At the time of discharge the patient was comfortable and was in no apparent distress. Patient is aware of diagnostic uncertainty and was notified though testing is negative here, there is a very small chance that pathology may be missed.  The patient understands these risks and the patient /family understood to return immediately to the emergency department if the symptoms worsen or if they have any additional concerns.    DISCHARGE MEDICATIONS  New Prescriptions    No medications on file       FOLLOW UP  No follow-up provider specified.    Kg Gonzalez    This note was created with the assistance of voice recognition technology.  While attempts were made to ensure accuracy, mis-transcription may be present due to limitations in the software.        Electronically signed by MD Kg Leary MD  10/21/23 9637    "

## 2023-10-21 NOTE — Clinical Note
Zuleyma Farrell was seen and treated in our emergency department on 10/21/2023.  She may return to work on 10/22/2023.       If you have any questions or concerns, please don't hesitate to call.      Maddie Urrutia MD

## 2023-10-21 NOTE — ED PROVIDER NOTES
HPI   Chief Complaint   Patient presents with    Abdominal Pain     Pt has had upper left quadrant abdominal pain since Thursday. Pt states the pain has started to move to the right and she is feeling bloated.       HPI  See MDM                  No data recorded                Patient History   Past Medical History:   Diagnosis Date    Acute frontal sinusitis, unspecified 10/23/2017    Acute frontal sinusitis    Acute pharyngitis due to other specified organisms 10/23/2017    Acute pharyngitis due to other specified organisms    Cough 06/07/2023    Encounter for other preprocedural examination 03/07/2016    Preop testing    Exertional shortness of breath 06/07/2023    Foot swelling 06/07/2023    Mastodynia 06/07/2023    Other chest pain 01/10/2014    Atypical chest pain    Other conditions influencing health status     Screening for malignant neoplasms, colon    Other conditions influencing health status 04/18/2016    Skin Lesion    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    Other muscle spasm 02/19/2016    Spasm of cervical paraspinous muscle    Pain in unspecified ankle and joints of unspecified foot 01/27/2015    Ankle joint pain    Pain in unspecified limb 07/29/2016    Limb pain    Pain in unspecified upper arm 07/29/2016    Pain in axilla    Personal history of diseases of the skin and subcutaneous tissue 02/20/2014    History of urticaria    Personal history of gestational diabetes     History of gestational diabetes    Personal history of other diseases of the musculoskeletal system and connective tissue 12/31/2013    History of low back pain    Personal history of other diseases of the nervous system and sense organs 02/19/2016    History of tension headache    Personal history of other diseases of the respiratory system 02/11/2014    History of sinusitis    Personal history of other diseases of the respiratory system 09/30/2013    History of acute sinusitis    Personal  history of other diseases of the respiratory system 10/23/2017    History of sore throat    Personal history of other diseases of the respiratory system 10/23/2017    History of acute bronchitis    Personal history of other diseases of the respiratory system 2017    History of acute pharyngitis    Personal history of other endocrine, nutritional and metabolic disease 2015    History of vitamin D deficiency    Personal history of other endocrine, nutritional and metabolic disease 2016    History of Hashimoto thyroiditis    Personal history of other endocrine, nutritional and metabolic disease 2016    History of hypokalemia    Personal history of other infectious and parasitic diseases 2013    History of scabies    Personal history of other infectious and parasitic diseases 2016    History of herpes zoster    Personal history of other infectious and parasitic diseases 2016    History of herpes zoster    Personal history of other specified conditions 2014    History of urinary frequency    Personal history of other specified conditions 2021    History of abdominal pain    Personal history of other specified conditions 2021    History of nausea    Personal history of urinary (tract) infections 2014    History of urinary tract infection    Right upper quadrant pain 2013    Abdominal pain, RUQ (right upper quadrant)    Right upper quadrant pain 2013    Abdominal pain, RUQ (right upper quadrant)    Sleep disorder 2023    Strain of muscle, fascia and tendon at neck level, initial encounter 2016    Cervical strain    Swelling of both hands 2023    Tachycardia      Past Surgical History:   Procedure Laterality Date     SECTION, CLASSIC  2013     Section    CHOLECYSTECTOMY  2013    Cholecystectomy Laparoscopic    GALLBLADDER SURGERY  2016    Gallbladder Surgery    OTHER SURGICAL HISTORY  06/15/2021     Bladder surgery    TOTAL THYROIDECTOMY  04/29/2016    Thyroid Surgery Total Thyroidectomy    TUBAL LIGATION  04/10/2013    Tubal Ligation     No family history on file.  Social History     Tobacco Use    Smoking status: Every Day     Types: Cigarettes    Smokeless tobacco: Never   Vaping Use    Vaping Use: Never used   Substance Use Topics    Alcohol use: Not Currently    Drug use: Never       Physical Exam   ED Triage Vitals [10/21/23 0422]   Temp Heart Rate Resp BP   36.6 °C (97.9 °F) 99 16 153/83      SpO2 Temp Source Heart Rate Source Patient Position   97 % Oral Monitor Sitting      BP Location FiO2 (%)     Right arm --       Physical Exam  See MDM  ED Course & MDM   Diagnoses as of 10/21/23 0742   Pain of upper abdomen       Medical Decision Making  38-year-old female who presented with left upper quadrant abdominal pain the past several weeks endorsed to me by Dr. Martinez pending CT abdomen pelvis to evaluate for left upper quadrant abdominal pain.  She has been treated symptomatically and feeling improved.  Labs without significant abnormality.  CT results with no acute process, ovarian cyst is incidental finding.  Abdominal exam is benign, nontender.  Discussed with patient the etiology is unclear but at this time do feel she is appropriate for outpatient management.  Discussed could be side effect of her methotrexate as can cause abdominal pain and GI upset.  Advised to consider starting Pepcid and refer to gastroenterology for follow-up.    Discussed with patient/family that more than 50% of abdominal pain that comes to the Emergency Department goes undiagnosed and that there were no emergent findings in workup today. Discussed that certain diagnoese such as appendicitis, colitis, diverticulitis, cholelithiasis or other illnesses are undetectable early on in their course and may not be seen on the first visit.  I recommended abdominal re-examination in 12-24 hours if  symptoms are not significantly  improved, sooner if worsening.    Maddie Urrutia MD    Amount and/or Complexity of Data Reviewed  Labs: ordered. Decision-making details documented in ED Course.  Radiology: ordered. Decision-making details documented in ED Course.    38-year-old female past medical history of Sjogren's, fibromyalgia, lupus who presents to the emergency room with left upper quadrant abdominal pain.  Pain has been going on for the last few days and now is migrated to the right side of her abdomen.  Patient had a normal bowel movement today and otherwise denies any urinary complaints such as hematuria or dysuria.  Patient does note some nausea but no emesis and otherwise has been having slightly decreased oral intake.  She otherwise denies recent fevers, chills, shortness of breath, chest pain.    Vital signs reviewed: Afebrile, 99 bpm, mildly hypertensive, 97% on room air    Exam     Constitutional: No acute distress. Resting comfortably.   Head: Normocephalic, atraumatic.   Eyes: Pupils equal bilaterally, EOM grossly intact, conjunctiva normal.  Mouth/Throat: Oropharynx is clear, moist mucus membranes.   Neck: Supple. No lymphadenopathy.  Cardiovascular: Regular rate and regular rhythm. Extremities are well-perfused.   Pulmonary/Chest: No respiratory distress, breathing comfortably on room air.    Abdominal: Soft, epigastric and right upper quadrant abdominal pain, non-distended. No rebound or guarding.   Musculoskeletal: No lower extremity edema.       Skin: Warm, dry, and intact.   Neurological: Patient is oriented to person, place, time, and situation. Face symmetric, hearing intact to voice, speech normal. Moves all extremities.   Psych: Mood, affect, thought content, and judgment normal.    Differential includes but is not limited to:  Pancreatitis versus nephrolithiasis versus pyelonephritis versus UTI    Amount and/or Complexity of Data Reviewed  External Data Reviewed: notes.      Labs: ordered.    Radiology: ordered and  independent interpretation performed.      ECG/medicine tests: ordered and independent interpretation performed.      Discussion of management or test interpretation with external provider(s):    Procedure  Procedures     Maddie Urrutia MD  10/21/23 0749       Maddie Urrutia MD  10/21/23 0757

## 2023-10-21 NOTE — DISCHARGE INSTRUCTIONS
The exact cause of your abdominal pain is unclear at this time, but could be related to that effect of your medication or inflammation of the lining of your stomach.  You may try taking over-the-counter Pepcid daily.    Diet as tolerated.    More than 50% of abdominal pain that comes to the Emergency Department goes undiagnosed and that there were no emergent findings in your workup today.  If your symptoms get worse, developed high fever, or persistent vomiting you should come back to the Emergency Department. Often times an appendicitis, colitis, diverticulitis, cholelithiasis and many other such illnesses are undetectable early on in their course and will not be seen on the first emergency department visit.  I recommend that you be re-examined in 12-24 hours if your symptoms are not significantly improved.

## 2023-10-21 NOTE — DISCHARGE INSTRUCTIONS
Try to use the Maalox and Bentyl in addition for pain relief and follow-up with a GI doctor with the name number provided

## 2023-10-25 DIAGNOSIS — M32.9: ICD-10-CM

## 2023-10-25 DIAGNOSIS — G89.29 OTHER CHRONIC PAIN: Primary | ICD-10-CM

## 2023-10-25 RX ORDER — HYDROCODONE BITARTRATE AND ACETAMINOPHEN 5; 325 MG/1; MG/1
1 TABLET ORAL EVERY 6 HOURS PRN
COMMUNITY
End: 2023-10-25 | Stop reason: SDUPTHER

## 2023-10-25 RX ORDER — HYDROCODONE BITARTRATE AND ACETAMINOPHEN 5; 325 MG/1; MG/1
1 TABLET ORAL EVERY 6 HOURS PRN
Qty: 120 TABLET | Refills: 0 | Status: SHIPPED | OUTPATIENT
Start: 2023-10-27 | End: 2023-11-22 | Stop reason: SDUPTHER

## 2023-10-25 NOTE — TELEPHONE ENCOUNTER
"PT LEFT VM REQUESTING REFILL OF PERCOCET 5/325. STATING IT IS DUE TOMORROW & WAS CALLING EARLY BECAUSE SHE \"KNOWS IT TAKES TIME TO REFILL\".  "

## 2023-10-26 RX ORDER — TRAMADOL HYDROCHLORIDE 50 MG/1
50 TABLET ORAL EVERY 8 HOURS PRN
Qty: 21 TABLET | Refills: 1 | Status: SHIPPED | OUTPATIENT
Start: 2023-10-26 | End: 2023-12-08

## 2023-11-14 DIAGNOSIS — Z87.898 HISTORY OF NAUSEA: ICD-10-CM

## 2023-11-14 RX ORDER — PROMETHAZINE HYDROCHLORIDE 25 MG/1
25 TABLET ORAL EVERY 8 HOURS PRN
Qty: 90 TABLET | Refills: 0 | Status: SHIPPED | OUTPATIENT
Start: 2023-11-14 | End: 2023-12-12 | Stop reason: SDUPTHER

## 2023-11-15 ENCOUNTER — TELEPHONE (OUTPATIENT)
Dept: PRIMARY CARE | Facility: CLINIC | Age: 38
End: 2023-11-15
Payer: COMMERCIAL

## 2023-11-15 NOTE — TELEPHONE ENCOUNTER
Patient called requesting refill of Promethazine for nausea. Let her know that she would only be able to get a 30 day supply with no additional refills until she is seen in the office for a F/U, stated she understood and would call to gabo an appointment.

## 2023-11-22 DIAGNOSIS — G89.29 OTHER CHRONIC PAIN: Primary | ICD-10-CM

## 2023-11-22 DIAGNOSIS — M32.9: ICD-10-CM

## 2023-11-22 RX ORDER — HYDROCODONE BITARTRATE AND ACETAMINOPHEN 5; 325 MG/1; MG/1
1 TABLET ORAL EVERY 6 HOURS PRN
Qty: 120 TABLET | Refills: 0 | Status: SHIPPED | OUTPATIENT
Start: 2023-11-25 | End: 2024-01-02 | Stop reason: ALTCHOICE

## 2023-11-28 DIAGNOSIS — G89.29 OTHER CHRONIC PAIN: ICD-10-CM

## 2023-11-29 NOTE — TELEPHONE ENCOUNTER
Please check with patient re pain med. She should really be on one type or at least start weaning off narcotic and have her on tramadol only.

## 2023-12-08 RX ORDER — TRAMADOL HYDROCHLORIDE 50 MG/1
50 TABLET ORAL EVERY 8 HOURS PRN
Qty: 90 TABLET | Refills: 3 | Status: SHIPPED | OUTPATIENT
Start: 2023-12-08 | End: 2023-12-15

## 2023-12-11 DIAGNOSIS — R00.2 PALPITATIONS: ICD-10-CM

## 2023-12-11 DIAGNOSIS — F41.9 ANXIETY: ICD-10-CM

## 2023-12-11 RX ORDER — ESCITALOPRAM OXALATE 20 MG/1
20 TABLET ORAL DAILY
Qty: 23 TABLET | Refills: 0 | Status: SHIPPED | OUTPATIENT
Start: 2023-12-11 | End: 2024-01-02 | Stop reason: SDUPTHER

## 2023-12-11 RX ORDER — METOPROLOL SUCCINATE 25 MG/1
25 TABLET, EXTENDED RELEASE ORAL DAILY
Qty: 23 TABLET | Refills: 0 | Status: SHIPPED | OUTPATIENT
Start: 2023-12-11 | End: 2024-01-02 | Stop reason: SDUPTHER

## 2023-12-12 DIAGNOSIS — Z87.898 HISTORY OF NAUSEA: ICD-10-CM

## 2023-12-12 DIAGNOSIS — E89.0 HYPOTHYROIDISM, POSTSURGICAL: ICD-10-CM

## 2023-12-12 RX ORDER — PROMETHAZINE HYDROCHLORIDE 25 MG/1
25 TABLET ORAL EVERY 8 HOURS PRN
Qty: 69 TABLET | Refills: 0 | Status: SHIPPED | OUTPATIENT
Start: 2023-12-12 | End: 2024-01-02 | Stop reason: SDUPTHER

## 2023-12-12 RX ORDER — LEVOTHYROXINE SODIUM 137 UG/1
137 TABLET ORAL DAILY
Qty: 23 TABLET | Refills: 0 | Status: SHIPPED | OUTPATIENT
Start: 2023-12-12 | End: 2024-01-02 | Stop reason: SDUPTHER

## 2023-12-19 ENCOUNTER — TELEPHONE (OUTPATIENT)
Dept: RHEUMATOLOGY | Facility: CLINIC | Age: 38
End: 2023-12-19
Payer: COMMERCIAL

## 2023-12-19 DIAGNOSIS — G89.29 OTHER CHRONIC PAIN: Primary | ICD-10-CM

## 2023-12-19 DIAGNOSIS — M06.09 POLYARTHRITIS WITH NEGATIVE RHEUMATOID FACTOR (MULTI): ICD-10-CM

## 2023-12-19 NOTE — TELEPHONE ENCOUNTER
"   [id number] Clinical Support Staff (supporting Jolly Conteh MD)5 days ago       I just talked to someone earlier in the week about stopping my Vicodin and going back to tramadol. I stopped the Vicodin  two days ago and have been taking the tramadol. It was working well for my pain until yesterday when both hips started hurting so I took a Vicodin And it helped. I woke up this morning and the pain is much worse. It’s in both of my hips in their tender to the touch. It almost feels like I have a bruise there but I don’t but it hurts to sit or to walk or even lay, I can’t lay on my side.  The Vicodin is not helping. I don’t know if I am going through a flare or what is happening I have not had any injuries. Is there anyway that the doctor can call me in just like a week worth of the Percocet? Just to get me through this pain, not as a full prescription. I don’t want a full 30 day prescription for the Percocet. I want to take the tramadol I don’t want to be on the narcotics if I don’t have to that’s why I’m only asking for a weeks supply to get me through this fibromyalgia flare. The Percocet works better than the Vicodin and the Vicodin is not helping with the pain. I’m still in pain and laying around. I have also started taking my steroids like I’m supposed to for a flare . Please advise.   Jairosamira Bud   [phone]      ABOVE IS MESSAGE FROM 12/14/23.  PT LEFT VM AGAIN TODAY, IS OUT OF VICODIN SINCE SHE WAS INSTRUCTED TO DOUBLE UP ON IT.HIP PAIN IS THE SAME & NOW \"GOING DOWN\" LEG; C/O EXCESS PRESSURE ON KNEES & HAVING TROUBLE WALKING. STATES TRAMADOL WORKS, BUT MAKES HER NAUSEOUS & GIVES HER A HEADACHE. ASKING FOR PERCOCET, ONLY WANTS 30 PILLS DOES NOT WANT A 30 DAY SUPPLY.     12/20/23 PT\"S  CALLED TODAY TO ASK IF YOU MADE S DECISION ON THE PERCOCET.    12/21/23 PT LEFT VM AGAIN REQUESTING Rx FOR PERCOCET  12/21/2023 PATIENT   CALLED STATED THE PATIENT IS JUST " CRYING IN PAIN AND WANTS PERCOCET CALLED IN .PLEASE ADVISE

## 2023-12-22 RX ORDER — OXYCODONE AND ACETAMINOPHEN 5; 325 MG/1; MG/1
1 TABLET ORAL EVERY 6 HOURS PRN
Qty: 28 TABLET | Refills: 0 | Status: SHIPPED | OUTPATIENT
Start: 2023-12-22 | End: 2024-01-02 | Stop reason: ALTCHOICE

## 2023-12-28 DIAGNOSIS — G89.29 OTHER CHRONIC PAIN: ICD-10-CM

## 2023-12-28 DIAGNOSIS — M06.09 POLYARTHRITIS WITH NEGATIVE RHEUMATOID FACTOR (MULTI): ICD-10-CM

## 2023-12-28 RX ORDER — HYDROCODONE BITARTRATE AND ACETAMINOPHEN 5; 325 MG/1; MG/1
1 TABLET ORAL EVERY 6 HOURS PRN
Qty: 120 TABLET | Refills: 0 | OUTPATIENT
Start: 2023-12-28

## 2023-12-28 NOTE — TELEPHONE ENCOUNTER
"Pt called, states she is in \"some sort of flare\", c/o blisters on hands & palms, legs a \"purplish\" color; itches. \"While on phone\", you gave a weeks worth of percocet on 12/22/23, since she is in a flare can you refill? (Pt says nothing about pain).  "

## 2024-01-02 ENCOUNTER — TRANSCRIBE ORDERS (OUTPATIENT)
Dept: RHEUMATOLOGY | Facility: CLINIC | Age: 39
End: 2024-01-02

## 2024-01-02 ENCOUNTER — OFFICE VISIT (OUTPATIENT)
Dept: PRIMARY CARE | Facility: CLINIC | Age: 39
End: 2024-01-02
Payer: COMMERCIAL

## 2024-01-02 VITALS
RESPIRATION RATE: 18 BRPM | BODY MASS INDEX: 35.07 KG/M2 | SYSTOLIC BLOOD PRESSURE: 120 MMHG | DIASTOLIC BLOOD PRESSURE: 82 MMHG | WEIGHT: 198 LBS

## 2024-01-02 DIAGNOSIS — Z87.898 HX OF ABDOMINAL PAIN: ICD-10-CM

## 2024-01-02 DIAGNOSIS — M06.09 RHEUMATOID ARTHRITIS OF MULTIPLE SITES WITHOUT RHEUMATOID FACTOR (MULTI): ICD-10-CM

## 2024-01-02 DIAGNOSIS — E89.0 HYPOTHYROIDISM, POSTSURGICAL: ICD-10-CM

## 2024-01-02 DIAGNOSIS — R76.8 POSITIVE ANA (ANTINUCLEAR ANTIBODY): ICD-10-CM

## 2024-01-02 DIAGNOSIS — M06.09 POLYARTHRITIS WITH NEGATIVE RHEUMATOID FACTOR (MULTI): ICD-10-CM

## 2024-01-02 DIAGNOSIS — Z87.898 HISTORY OF NAUSEA: ICD-10-CM

## 2024-01-02 DIAGNOSIS — G89.29 OTHER CHRONIC PAIN: ICD-10-CM

## 2024-01-02 DIAGNOSIS — M35.03 SJOGREN SYNDROME WITH MYOPATHY (MULTI): ICD-10-CM

## 2024-01-02 DIAGNOSIS — E55.9 VITAMIN D DEFICIENCY: ICD-10-CM

## 2024-01-02 DIAGNOSIS — F41.9 ANXIETY: ICD-10-CM

## 2024-01-02 DIAGNOSIS — R00.2 PALPITATIONS: ICD-10-CM

## 2024-01-02 DIAGNOSIS — M35.09 SJOGREN SYNDROME WITH OTHER ORGAN INVOLVEMENT (MULTI): ICD-10-CM

## 2024-01-02 PROBLEM — M45.9 ANKYLOSING SPONDYLITIS (MULTI): Status: ACTIVE | Noted: 2024-01-02

## 2024-01-02 PROBLEM — M35.00 SICCA SYNDROME (MULTI): Status: ACTIVE | Noted: 2024-01-02

## 2024-01-02 PROCEDURE — 99214 OFFICE O/P EST MOD 30 MIN: CPT | Performed by: NURSE PRACTITIONER

## 2024-01-02 RX ORDER — LEVOTHYROXINE SODIUM 137 UG/1
137 TABLET ORAL DAILY
Qty: 90 TABLET | Refills: 3 | Status: SHIPPED | OUTPATIENT
Start: 2024-01-02

## 2024-01-02 RX ORDER — FAMOTIDINE 20 MG/1
20 TABLET, FILM COATED ORAL 2 TIMES DAILY
Qty: 180 TABLET | Refills: 3 | Status: SHIPPED | OUTPATIENT
Start: 2024-01-02

## 2024-01-02 RX ORDER — METHOTREXATE 2.5 MG/1
2.5 TABLET ORAL
COMMUNITY
Start: 2023-09-22 | End: 2024-05-29 | Stop reason: WASHOUT

## 2024-01-02 RX ORDER — MELOXICAM 15 MG/1
15 TABLET ORAL DAILY
COMMUNITY
End: 2024-04-01 | Stop reason: SDUPTHER

## 2024-01-02 RX ORDER — METOPROLOL SUCCINATE 25 MG/1
25 TABLET, EXTENDED RELEASE ORAL DAILY
Qty: 90 TABLET | Refills: 3 | Status: SHIPPED | OUTPATIENT
Start: 2024-01-02

## 2024-01-02 RX ORDER — ESCITALOPRAM OXALATE 20 MG/1
20 TABLET ORAL DAILY
Qty: 90 TABLET | Refills: 3 | Status: SHIPPED | OUTPATIENT
Start: 2024-01-02

## 2024-01-02 RX ORDER — OXYCODONE AND ACETAMINOPHEN 5; 325 MG/1; MG/1
1 TABLET ORAL EVERY 6 HOURS PRN
Qty: 28 TABLET | Refills: 0 | Status: SHIPPED | OUTPATIENT
Start: 2024-01-02 | End: 2024-01-12 | Stop reason: SDUPTHER

## 2024-01-02 RX ORDER — NALOXONE HYDROCHLORIDE 4 MG/.1ML
SPRAY NASAL
COMMUNITY
Start: 2023-08-28 | End: 2024-01-02 | Stop reason: ALTCHOICE

## 2024-01-02 RX ORDER — PROMETHAZINE HYDROCHLORIDE 25 MG/1
25 TABLET ORAL 2 TIMES DAILY PRN
Qty: 60 TABLET | Refills: 0 | Status: SHIPPED | OUTPATIENT
Start: 2024-01-02 | End: 2024-01-22 | Stop reason: SDUPTHER

## 2024-01-02 RX ORDER — TRAMADOL HYDROCHLORIDE 50 MG/1
50 TABLET ORAL EVERY 8 HOURS
COMMUNITY
Start: 2023-07-10 | End: 2024-03-29

## 2024-01-02 RX ORDER — FOLIC ACID 1 MG/1
1 TABLET ORAL DAILY
COMMUNITY
Start: 2023-09-22 | End: 2024-04-11

## 2024-01-02 NOTE — TELEPHONE ENCOUNTER
Patient called stated that she asked for a refill and was told you could not refill at this time. She stated she is calling to see if she now can have it filled now.  Patient stated she is no longer taking the  Hydrocodone (Norco)Please advise

## 2024-01-02 NOTE — PROGRESS NOTES
Subjective   Patient ID: Zuleyma Farrell is a 38 y.o. female who presents for Follow-up (Patient in today for routine F/U and med refills, reports no other concerns at this time. ).    38-year-old female here for routine 6-month follow-up.  She has past medical history significant for hypothyroidism, thyroid cancer, lupus, Sjogren's, fibromyalgia chronic pain and ankylosing spondylitis.  She follows with rheumatology routinely.  Medication list reviewed.  Chronic headaches.  She has 1 today.  She uses promethazine 3-4 times every day she states for years.  If she does not have it then she feels like she does not feel well.  She needs refills on the primary medications the rest of them for her chronic pain gets filled by rheumatology.  She has not had her TSH checked since 2022.  She states that she feels like maybe her thyroid is off.  She is fatigued and blocked with headaches  Her most recent available blood work was reviewed she is vitamin B12 deficient in vitamin D deficient not on supplementation         Review of Systems   All other systems reviewed and are negative.      Objective   /82   Resp 18   Wt 89.8 kg (198 lb)   BMI 35.07 kg/m²     Physical Exam  Constitutional:       Appearance: Normal appearance. She is obese.   Cardiovascular:      Rate and Rhythm: Normal rate and regular rhythm.   Pulmonary:      Effort: Pulmonary effort is normal.      Breath sounds: Normal breath sounds.   Neurological:      Mental Status: She is alert and oriented to person, place, and time.   Psychiatric:         Mood and Affect: Mood normal.         Assessment/Plan   Diagnoses and all orders for this visit:  Anxiety  Comments:  Chronic on medication I asked that she try to add magnesium glycinate 1 tab p.o. twice daily no less than 500 mg of supplement daily  Orders:  -     escitalopram (Lexapro) 20 mg tablet; Take 1 tablet (20 mg) by mouth once daily.  Hx of abdominal pain  Comments:  Reviewed the chart with her.   Refill famotidine I think she needs to wean and stop promethazine she is probably having withdrawal from it  Orders:  -     famotidine (Pepcid) 20 mg tablet; Take 1 tablet (20 mg) by mouth 2 times a day.  Hypothyroidism, postsurgical  Comments:  Update TSH and free T4  Orders:  -     levothyroxine (Synthroid, Levoxyl) 137 mcg tablet; Take 1 tablet (137 mcg) by mouth once daily.  -     Thyroid Stimulating Hormone; Future  -     Thyroxine, Free; Future  Palpitations  Comments:  Chronic on metoprolol  Orders:  -     metoprolol succinate XL (Toprol-XL) 25 mg 24 hr tablet; Take 1 tablet (25 mg) by mouth once daily.  History of nausea  Comments:  Related to migraines.  Again wean and DC promethazine she is not using it for nausea anymore  Orders:  -     promethazine (Phenergan) 25 mg tablet; Take 1 tablet (25 mg) by mouth 2 times a day as needed for nausea.  Sjogren syndrome with myopathy (CMS/HCC)  Comments:  Defer treatment to rheumatology she is on multiple medications including pain meds.  Add B12 supplement daily and vitamin D  Other orders  -     Follow Up In Primary Care - Other; Future

## 2024-01-02 NOTE — PATIENT INSTRUCTIONS
Wean promethazine. 1 tab am and pm x 5 days then  daily in am x 5 days then every other day until the pills are gone   Magnesium Glycinate  up to 500 mg a day- [facility] am and pm- this helps with anxiety, pain, POTS, headaches and BP  Methyl Folate + methyl B12 - [employer] MTHFR daily in am - energy  Vitamin D 3 5,000 units a day with food with breakfast- depression and pain, immunity, inflammation

## 2024-01-11 ENCOUNTER — LAB (OUTPATIENT)
Dept: LAB | Facility: LAB | Age: 39
End: 2024-01-11
Payer: COMMERCIAL

## 2024-01-11 DIAGNOSIS — M06.09 RHEUMATOID ARTHRITIS OF MULTIPLE SITES WITHOUT RHEUMATOID FACTOR (MULTI): ICD-10-CM

## 2024-01-11 DIAGNOSIS — E55.9 VITAMIN D DEFICIENCY: ICD-10-CM

## 2024-01-11 DIAGNOSIS — E89.0 HYPOTHYROIDISM, POSTSURGICAL: ICD-10-CM

## 2024-01-11 DIAGNOSIS — M35.09 SJOGREN SYNDROME WITH OTHER ORGAN INVOLVEMENT (MULTI): ICD-10-CM

## 2024-01-11 DIAGNOSIS — R76.8 POSITIVE ANA (ANTINUCLEAR ANTIBODY): ICD-10-CM

## 2024-01-11 LAB
ALBUMIN SERPL BCP-MCNC: 4.5 G/DL (ref 3.4–5)
ALP SERPL-CCNC: 42 U/L (ref 33–110)
ALT SERPL W P-5'-P-CCNC: 28 U/L (ref 7–45)
AMORPH CRY #/AREA UR COMP ASSIST: ABNORMAL /HPF
ANION GAP SERPL CALC-SCNC: 11 MMOL/L (ref 10–20)
APPEARANCE UR: ABNORMAL
AST SERPL W P-5'-P-CCNC: 16 U/L (ref 9–39)
BASOPHILS # BLD AUTO: 0.03 X10*3/UL (ref 0–0.1)
BASOPHILS NFR BLD AUTO: 0.5 %
BILIRUB SERPL-MCNC: 0.4 MG/DL (ref 0–1.2)
BILIRUB UR STRIP.AUTO-MCNC: ABNORMAL MG/DL
BUN SERPL-MCNC: 20 MG/DL (ref 6–23)
CALCIUM SERPL-MCNC: 9.2 MG/DL (ref 8.6–10.3)
CHLORIDE SERPL-SCNC: 104 MMOL/L (ref 98–107)
CK SERPL-CCNC: 36 U/L (ref 0–215)
CO2 SERPL-SCNC: 26 MMOL/L (ref 21–32)
COLOR UR: YELLOW
CREAT SERPL-MCNC: 0.66 MG/DL (ref 0.5–1.05)
CRP SERPL-MCNC: 0.17 MG/DL
EGFRCR SERPLBLD CKD-EPI 2021: >90 ML/MIN/1.73M*2
EOSINOPHIL # BLD AUTO: 0.11 X10*3/UL (ref 0–0.7)
EOSINOPHIL NFR BLD AUTO: 1.8 %
ERYTHROCYTE [DISTWIDTH] IN BLOOD BY AUTOMATED COUNT: 15.2 % (ref 11.5–14.5)
ERYTHROCYTE [SEDIMENTATION RATE] IN BLOOD BY WESTERGREN METHOD: 6 MM/H (ref 0–20)
GLUCOSE SERPL-MCNC: 77 MG/DL (ref 74–99)
GLUCOSE UR STRIP.AUTO-MCNC: NEGATIVE MG/DL
HCT VFR BLD AUTO: 38.5 % (ref 36–46)
HGB BLD-MCNC: 13.1 G/DL (ref 12–16)
IMM GRANULOCYTES # BLD AUTO: 0.02 X10*3/UL (ref 0–0.7)
IMM GRANULOCYTES NFR BLD AUTO: 0.3 % (ref 0–0.9)
KETONES UR STRIP.AUTO-MCNC: NEGATIVE MG/DL
LEUKOCYTE ESTERASE UR QL STRIP.AUTO: NEGATIVE
LYMPHOCYTES # BLD AUTO: 2.52 X10*3/UL (ref 1.2–4.8)
LYMPHOCYTES NFR BLD AUTO: 40.2 %
MCH RBC QN AUTO: 35.4 PG (ref 26–34)
MCHC RBC AUTO-ENTMCNC: 34 G/DL (ref 32–36)
MCV RBC AUTO: 104 FL (ref 80–100)
MONOCYTES # BLD AUTO: 0.44 X10*3/UL (ref 0.1–1)
MONOCYTES NFR BLD AUTO: 7 %
MUCOUS THREADS #/AREA URNS AUTO: ABNORMAL /LPF
NEUTROPHILS # BLD AUTO: 3.15 X10*3/UL (ref 1.2–7.7)
NEUTROPHILS NFR BLD AUTO: 50.2 %
NITRITE UR QL STRIP.AUTO: NEGATIVE
NRBC BLD-RTO: 0 /100 WBCS (ref 0–0)
PH UR STRIP.AUTO: 5 [PH]
PLATELET # BLD AUTO: 330 X10*3/UL (ref 150–450)
POTASSIUM SERPL-SCNC: 4.5 MMOL/L (ref 3.5–5.3)
PROT SERPL-MCNC: 6.9 G/DL (ref 6.4–8.2)
PROT UR STRIP.AUTO-MCNC: ABNORMAL MG/DL
RBC # BLD AUTO: 3.7 X10*6/UL (ref 4–5.2)
RBC # UR STRIP.AUTO: NEGATIVE /UL
RBC #/AREA URNS AUTO: ABNORMAL /HPF
SODIUM SERPL-SCNC: 136 MMOL/L (ref 136–145)
SP GR UR STRIP.AUTO: 1.03
T4 FREE SERPL-MCNC: 0.97 NG/DL (ref 0.61–1.12)
TSH SERPL-ACNC: 2.42 MIU/L (ref 0.44–3.98)
UROBILINOGEN UR STRIP.AUTO-MCNC: 2 MG/DL
WBC # BLD AUTO: 6.3 X10*3/UL (ref 4.4–11.3)
WBC #/AREA URNS AUTO: ABNORMAL /HPF

## 2024-01-11 PROCEDURE — 82652 VIT D 1 25-DIHYDROXY: CPT

## 2024-01-11 PROCEDURE — 36415 COLL VENOUS BLD VENIPUNCTURE: CPT

## 2024-01-11 PROCEDURE — 86160 COMPLEMENT ANTIGEN: CPT

## 2024-01-11 PROCEDURE — 82306 VITAMIN D 25 HYDROXY: CPT

## 2024-01-12 ENCOUNTER — TELEPHONE (OUTPATIENT)
Dept: RHEUMATOLOGY | Facility: CLINIC | Age: 39
End: 2024-01-12
Payer: COMMERCIAL

## 2024-01-12 DIAGNOSIS — G89.29 OTHER CHRONIC PAIN: ICD-10-CM

## 2024-01-12 DIAGNOSIS — M06.09 POLYARTHRITIS WITH NEGATIVE RHEUMATOID FACTOR (MULTI): ICD-10-CM

## 2024-01-12 LAB
25(OH)D3 SERPL-MCNC: 16 NG/ML (ref 30–100)
C3 SERPL-MCNC: 149 MG/DL (ref 87–200)
C4 SERPL-MCNC: 34 MG/DL (ref 10–50)

## 2024-01-12 NOTE — TELEPHONE ENCOUNTER
Patient called asked if you can refill her pain medication and put instructions on bottle how to taper off so she does not have rebound pain

## 2024-01-14 LAB — 1,25(OH)2D3 SERPL-MCNC: 62.3 PG/ML (ref 19.9–79.3)

## 2024-01-16 RX ORDER — OXYCODONE AND ACETAMINOPHEN 5; 325 MG/1; MG/1
1 TABLET ORAL EVERY 6 HOURS PRN
Qty: 28 TABLET | Refills: 0 | Status: SHIPPED | OUTPATIENT
Start: 2024-01-16 | End: 2024-01-22 | Stop reason: SDUPTHER

## 2024-01-16 NOTE — TELEPHONE ENCOUNTER
Patient's  called left  asking for a refill for his wife's pain medication. I called back and got his voice mail

## 2024-01-17 NOTE — TELEPHONE ENCOUNTER
Keep taking the tramadol and wean off Percocet.  Try this taper 3 times daily for 3 days, then 2 times daily for 2 days then once daily for 1-2 days.

## 2024-01-19 PROBLEM — E66.9 OBESITY WITH BODY MASS INDEX 30 OR GREATER: Status: ACTIVE | Noted: 2024-01-19

## 2024-01-19 PROBLEM — M47.816 ARTHROPATHY OF LUMBAR FACET JOINT: Status: ACTIVE | Noted: 2024-01-19

## 2024-01-19 PROBLEM — N92.1 MENORRHAGIA WITH IRREGULAR CYCLE: Status: ACTIVE | Noted: 2024-01-19

## 2024-01-19 PROBLEM — K85.90 PANCREATITIS (HHS-HCC): Status: ACTIVE | Noted: 2024-01-19

## 2024-01-19 PROBLEM — L28.2 PRURITIC RASH: Status: ACTIVE | Noted: 2024-01-19

## 2024-01-19 PROBLEM — M79.7 FIBROMYALGIA: Status: ACTIVE | Noted: 2024-01-19

## 2024-01-19 PROBLEM — M06.09 POLYARTHRITIS WITH NEGATIVE RHEUMATOID FACTOR (MULTI): Status: ACTIVE | Noted: 2024-01-19

## 2024-01-19 PROBLEM — Z90.49 HISTORY OF CHOLECYSTECTOMY: Status: ACTIVE | Noted: 2024-01-19

## 2024-01-19 PROBLEM — Z86.32 HISTORY OF GESTATIONAL DIABETES MELLITUS (GDM): Status: ACTIVE | Noted: 2024-01-19

## 2024-01-19 PROBLEM — R73.9 HYPERGLYCEMIA: Status: ACTIVE | Noted: 2024-01-19

## 2024-01-19 PROBLEM — G44.011 INTRACTABLE EPISODIC CLUSTER HEADACHE: Status: ACTIVE | Noted: 2024-01-19

## 2024-01-19 RX ORDER — GABAPENTIN 300 MG/1
300 CAPSULE ORAL 3 TIMES DAILY
COMMUNITY
Start: 2024-01-08 | End: 2024-01-22 | Stop reason: WASHOUT

## 2024-01-20 ENCOUNTER — HOSPITAL ENCOUNTER (EMERGENCY)
Facility: HOSPITAL | Age: 39
Discharge: HOME | End: 2024-01-20
Attending: STUDENT IN AN ORGANIZED HEALTH CARE EDUCATION/TRAINING PROGRAM
Payer: COMMERCIAL

## 2024-01-20 ENCOUNTER — APPOINTMENT (OUTPATIENT)
Dept: RADIOLOGY | Facility: HOSPITAL | Age: 39
End: 2024-01-20
Payer: COMMERCIAL

## 2024-01-20 VITALS
DIASTOLIC BLOOD PRESSURE: 98 MMHG | TEMPERATURE: 98.1 F | WEIGHT: 192.9 LBS | BODY MASS INDEX: 34.18 KG/M2 | RESPIRATION RATE: 17 BRPM | HEART RATE: 93 BPM | HEIGHT: 63 IN | OXYGEN SATURATION: 98 % | SYSTOLIC BLOOD PRESSURE: 131 MMHG

## 2024-01-20 DIAGNOSIS — K59.03 DRUG-INDUCED CONSTIPATION: Primary | ICD-10-CM

## 2024-01-20 LAB
ALBUMIN SERPL-MCNC: 4.3 G/DL (ref 3.5–5)
ALP BLD-CCNC: 47 U/L (ref 35–125)
ALT SERPL-CCNC: 18 U/L (ref 5–40)
ANION GAP SERPL CALC-SCNC: 10 MMOL/L
AST SERPL-CCNC: 13 U/L (ref 5–40)
BASOPHILS # BLD AUTO: 0.03 X10*3/UL (ref 0–0.1)
BASOPHILS NFR BLD AUTO: 0.5 %
BILIRUB SERPL-MCNC: 0.2 MG/DL (ref 0.1–1.2)
BUN SERPL-MCNC: 17 MG/DL (ref 8–25)
CALCIUM SERPL-MCNC: 9.4 MG/DL (ref 8.5–10.4)
CHLORIDE SERPL-SCNC: 106 MMOL/L (ref 97–107)
CO2 SERPL-SCNC: 21 MMOL/L (ref 24–31)
CREAT SERPL-MCNC: 0.6 MG/DL (ref 0.4–1.6)
EGFRCR SERPLBLD CKD-EPI 2021: >90 ML/MIN/1.73M*2
EOSINOPHIL # BLD AUTO: 0.08 X10*3/UL (ref 0–0.7)
EOSINOPHIL NFR BLD AUTO: 1.3 %
ERYTHROCYTE [DISTWIDTH] IN BLOOD BY AUTOMATED COUNT: 14.7 % (ref 11.5–14.5)
GLUCOSE SERPL-MCNC: 112 MG/DL (ref 65–99)
HCG SERPL-ACNC: <1 MIU/ML
HCT VFR BLD AUTO: 39.8 % (ref 36–46)
HGB BLD-MCNC: 13.7 G/DL (ref 12–16)
IMM GRANULOCYTES # BLD AUTO: 0.01 X10*3/UL (ref 0–0.7)
IMM GRANULOCYTES NFR BLD AUTO: 0.2 % (ref 0–0.9)
LYMPHOCYTES # BLD AUTO: 1.6 X10*3/UL (ref 1.2–4.8)
LYMPHOCYTES NFR BLD AUTO: 26.3 %
MCH RBC QN AUTO: 35.4 PG (ref 26–34)
MCHC RBC AUTO-ENTMCNC: 34.4 G/DL (ref 32–36)
MCV RBC AUTO: 103 FL (ref 80–100)
MONOCYTES # BLD AUTO: 0.53 X10*3/UL (ref 0.1–1)
MONOCYTES NFR BLD AUTO: 8.7 %
NEUTROPHILS # BLD AUTO: 3.84 X10*3/UL (ref 1.2–7.7)
NEUTROPHILS NFR BLD AUTO: 63 %
NRBC BLD-RTO: 0 /100 WBCS (ref 0–0)
PLATELET # BLD AUTO: 298 X10*3/UL (ref 150–450)
POTASSIUM SERPL-SCNC: 3.9 MMOL/L (ref 3.4–5.1)
PROT SERPL-MCNC: 7.2 G/DL (ref 5.9–7.9)
RBC # BLD AUTO: 3.87 X10*6/UL (ref 4–5.2)
SODIUM SERPL-SCNC: 137 MMOL/L (ref 133–145)
WBC # BLD AUTO: 6.1 X10*3/UL (ref 4.4–11.3)

## 2024-01-20 PROCEDURE — 99284 EMERGENCY DEPT VISIT MOD MDM: CPT | Performed by: STUDENT IN AN ORGANIZED HEALTH CARE EDUCATION/TRAINING PROGRAM

## 2024-01-20 PROCEDURE — 80053 COMPREHEN METABOLIC PANEL: CPT | Performed by: STUDENT IN AN ORGANIZED HEALTH CARE EDUCATION/TRAINING PROGRAM

## 2024-01-20 PROCEDURE — 2550000001 HC RX 255 CONTRASTS: Performed by: STUDENT IN AN ORGANIZED HEALTH CARE EDUCATION/TRAINING PROGRAM

## 2024-01-20 PROCEDURE — 36415 COLL VENOUS BLD VENIPUNCTURE: CPT | Performed by: STUDENT IN AN ORGANIZED HEALTH CARE EDUCATION/TRAINING PROGRAM

## 2024-01-20 PROCEDURE — 2500000005 HC RX 250 GENERAL PHARMACY W/O HCPCS: Performed by: STUDENT IN AN ORGANIZED HEALTH CARE EDUCATION/TRAINING PROGRAM

## 2024-01-20 PROCEDURE — 2500000004 HC RX 250 GENERAL PHARMACY W/ HCPCS (ALT 636 FOR OP/ED): Performed by: STUDENT IN AN ORGANIZED HEALTH CARE EDUCATION/TRAINING PROGRAM

## 2024-01-20 PROCEDURE — 84702 CHORIONIC GONADOTROPIN TEST: CPT | Performed by: STUDENT IN AN ORGANIZED HEALTH CARE EDUCATION/TRAINING PROGRAM

## 2024-01-20 PROCEDURE — 96374 THER/PROPH/DIAG INJ IV PUSH: CPT | Mod: 59

## 2024-01-20 PROCEDURE — 85025 COMPLETE CBC W/AUTO DIFF WBC: CPT | Performed by: STUDENT IN AN ORGANIZED HEALTH CARE EDUCATION/TRAINING PROGRAM

## 2024-01-20 PROCEDURE — 74177 CT ABD & PELVIS W/CONTRAST: CPT

## 2024-01-20 RX ORDER — LORAZEPAM 2 MG/ML
1 INJECTION INTRAMUSCULAR ONCE
Status: COMPLETED | OUTPATIENT
Start: 2024-01-20 | End: 2024-01-20

## 2024-01-20 RX ORDER — POLYETHYLENE GLYCOL 3350 17 G/17G
17 POWDER, FOR SOLUTION ORAL DAILY
Qty: 595 G | Refills: 0 | Status: SHIPPED | OUTPATIENT
Start: 2024-01-20 | End: 2024-01-22 | Stop reason: WASHOUT

## 2024-01-20 RX ADMIN — IOHEXOL 75 ML: 350 INJECTION, SOLUTION INTRAVENOUS at 11:36

## 2024-01-20 RX ADMIN — SODIUM PHOSPHATE, DIBASIC AND SODIUM PHOSPHATE, MONOBASIC 1 ENEMA: 7; 19 ENEMA RECTAL at 13:20

## 2024-01-20 RX ADMIN — LORAZEPAM 1 MG: 2 INJECTION, SOLUTION INTRAMUSCULAR; INTRAVENOUS at 11:06

## 2024-01-20 ASSESSMENT — PAIN DESCRIPTION - FREQUENCY: FREQUENCY: CONSTANT/CONTINUOUS

## 2024-01-20 ASSESSMENT — PAIN DESCRIPTION - LOCATION: LOCATION: ABDOMEN

## 2024-01-20 ASSESSMENT — PAIN DESCRIPTION - PAIN TYPE: TYPE: ACUTE PAIN

## 2024-01-20 ASSESSMENT — PAIN DESCRIPTION - ONSET: ONSET: SUDDEN

## 2024-01-20 ASSESSMENT — PAIN DESCRIPTION - DESCRIPTORS: DESCRIPTORS: CRAMPING

## 2024-01-20 ASSESSMENT — PAIN SCALES - GENERAL: PAINLEVEL_OUTOF10: 4

## 2024-01-20 ASSESSMENT — COLUMBIA-SUICIDE SEVERITY RATING SCALE - C-SSRS
2. HAVE YOU ACTUALLY HAD ANY THOUGHTS OF KILLING YOURSELF?: NO
6. HAVE YOU EVER DONE ANYTHING, STARTED TO DO ANYTHING, OR PREPARED TO DO ANYTHING TO END YOUR LIFE?: NO
1. IN THE PAST MONTH, HAVE YOU WISHED YOU WERE DEAD OR WISHED YOU COULD GO TO SLEEP AND NOT WAKE UP?: NO

## 2024-01-20 ASSESSMENT — PAIN - FUNCTIONAL ASSESSMENT: PAIN_FUNCTIONAL_ASSESSMENT: 0-10

## 2024-01-20 ASSESSMENT — PAIN DESCRIPTION - ORIENTATION: ORIENTATION: LOWER

## 2024-01-20 ASSESSMENT — PAIN DESCRIPTION - PROGRESSION: CLINICAL_PROGRESSION: NOT CHANGED

## 2024-01-22 ENCOUNTER — OFFICE VISIT (OUTPATIENT)
Dept: RHEUMATOLOGY | Facility: CLINIC | Age: 39
End: 2024-01-22
Payer: COMMERCIAL

## 2024-01-22 VITALS
WEIGHT: 185.41 LBS | DIASTOLIC BLOOD PRESSURE: 94 MMHG | SYSTOLIC BLOOD PRESSURE: 136 MMHG | HEART RATE: 99 BPM | OXYGEN SATURATION: 97 % | BODY MASS INDEX: 32.84 KG/M2

## 2024-01-22 DIAGNOSIS — Z79.899 ENCOUNTER FOR LONG-TERM (CURRENT) USE OF MEDICATIONS: ICD-10-CM

## 2024-01-22 DIAGNOSIS — K59.00 CONSTIPATION, UNSPECIFIED CONSTIPATION TYPE: ICD-10-CM

## 2024-01-22 DIAGNOSIS — G89.29 OTHER CHRONIC PAIN: ICD-10-CM

## 2024-01-22 DIAGNOSIS — D75.89 MACROCYTOSIS WITHOUT ANEMIA: ICD-10-CM

## 2024-01-22 DIAGNOSIS — M06.09 POLYARTHRITIS WITH NEGATIVE RHEUMATOID FACTOR (MULTI): ICD-10-CM

## 2024-01-22 DIAGNOSIS — Z87.898 HISTORY OF NAUSEA: ICD-10-CM

## 2024-01-22 DIAGNOSIS — M35.03 SJOGREN SYNDROME WITH MYOPATHY (MULTI): Primary | ICD-10-CM

## 2024-01-22 DIAGNOSIS — R76.8 POSITIVE ANA (ANTINUCLEAR ANTIBODY): ICD-10-CM

## 2024-01-22 DIAGNOSIS — E55.9 VITAMIN D DEFICIENCY: ICD-10-CM

## 2024-01-22 PROCEDURE — 99215 OFFICE O/P EST HI 40 MIN: CPT | Performed by: INTERNAL MEDICINE

## 2024-01-22 RX ORDER — DOCUSATE SODIUM 100 MG/1
100 CAPSULE, LIQUID FILLED ORAL 2 TIMES DAILY PRN
Qty: 60 CAPSULE | Refills: 5 | Status: SHIPPED | OUTPATIENT
Start: 2024-01-22

## 2024-01-22 RX ORDER — ERGOCALCIFEROL 1.25 MG/1
50000 CAPSULE ORAL
Qty: 12 CAPSULE | Refills: 1 | Status: SHIPPED | OUTPATIENT
Start: 2024-01-22 | End: 2024-04-21

## 2024-01-22 RX ORDER — PROMETHAZINE HYDROCHLORIDE 25 MG/1
25 TABLET ORAL 2 TIMES DAILY PRN
Qty: 60 TABLET | Refills: 3 | Status: SHIPPED | OUTPATIENT
Start: 2024-01-22 | End: 2024-01-24 | Stop reason: SDUPTHER

## 2024-01-22 RX ORDER — TIZANIDINE 4 MG/1
2 TABLET ORAL EVERY 8 HOURS PRN
Qty: 90 TABLET | Refills: 3 | Status: SHIPPED | OUTPATIENT
Start: 2024-01-22 | End: 2024-05-29

## 2024-01-22 RX ORDER — OXYCODONE AND ACETAMINOPHEN 5; 325 MG/1; MG/1
1 TABLET ORAL EVERY 6 HOURS PRN
Qty: 28 TABLET | Refills: 0 | Status: SHIPPED | OUTPATIENT
Start: 2024-01-24 | End: 2024-01-24 | Stop reason: SDUPTHER

## 2024-01-22 ASSESSMENT — PATIENT HEALTH QUESTIONNAIRE - PHQ9
1. LITTLE INTEREST OR PLEASURE IN DOING THINGS: NOT AT ALL
2. FEELING DOWN, DEPRESSED OR HOPELESS: NOT AT ALL
SUM OF ALL RESPONSES TO PHQ9 QUESTIONS 1 AND 2: 0

## 2024-01-22 ASSESSMENT — ROUTINE ASSESSMENT OF PATIENT INDEX DATA (RAPID3)
TOTAL RAPID3 SCORE: 7.2
ON A SCALE OF ONE TO TEN, HOW MUCH PAIN HAVE YOU HAD BECAUSE OF YOUR CONDITION OVER THE PAST WEEK?: 4.5
PICK_CLOTHES_OFF_FLOOR: WITHOUT ANY DIFFICULTY
IN_OUT_TRANSPORT: WITHOUT ANY DIFFICULTY
LIFT_CUP_TO_MOUTH: WITHOUT ANY DIFFICULTY
PARTIPATE_RECREATIONAL_ACTIVITIES: WITH SOME DIFFICULTY
WALK_FLAT_GROUND: WITHOUT ANY DIFFICULTY
GOOD_NIGHTS_SLEEP: WITH SOME DIFFICULTY
SUM OF QUESTIONS A TO J: 2
ON A SCALE OF ONE TO TEN, HOW MUCH PAIN HAVE YOU HAD BECAUSE OF YOUR CONDITION OVER THE PAST WEEK?: 4.5
WEIGHTED_TOTAL_SCORE: 2.4
ON A SCALE OF ONE TO TEN, CONSIDERING ALL THE WAYS IN WHICH ILLNESS AND HEALTH CONDITIONS MAY AFFECT YOU AT THIS TIME, PLEASE INDICATE BELOW HOW YOU ARE DOING:: 2
WALK_KILOMETERS: WITH SOME DIFFICULTY
SEVERITY_SCORE: 0
FN_SCORE: 0.7
FEELINGS_ANXIETY_NERVOUS: WITH MUCH DIFFICULTY
FEELINGS_DEPRESSION: WITHOUT ANY DIFFICULTY
DRESS_YOURSELF: WITHOUT ANY DIFFICULTY
TURN_FAUCETS_OFF: WITHOUT ANY DIFFICULTY
ON A SCALE OF ONE TO TEN, CONSIDERING ALL THE WAYS IN WHICH ILLNESS AND HEALTH CONDITIONS MAY AFFECT YOU AT THIS TIME, PLEASE INDICATE BELOW HOW YOU ARE DOING:: 2
IN_OUT_BED: WITHOUT ANY DIFFICULTY
WASH_DRY_BODY: WITHOUT ANY DIFFICULTY

## 2024-01-22 ASSESSMENT — ENCOUNTER SYMPTOMS
DEPRESSION: 0
OCCASIONAL FEELINGS OF UNSTEADINESS: 0
LOSS OF SENSATION IN FEET: 0

## 2024-01-22 ASSESSMENT — PAIN - FUNCTIONAL ASSESSMENT: PAIN_FUNCTIONAL_ASSESSMENT: 0-10

## 2024-01-22 ASSESSMENT — PAIN SCALES - GENERAL: PAINLEVEL_OUTOF10: 3

## 2024-01-22 NOTE — PATIENT INSTRUCTIONS
Percocet taper:  Try 3 times daily for 3 days, then 2 times daily for 2 days then once daily for 1-2 days.

## 2024-01-22 NOTE — PROGRESS NOTES
[facility]/  Rheumatology  [facility], [address]  [place] [address]  Phone: [phone]  Fax: [phone]    Rheumatology Progress Note 01/22/24    Zuleyma Farrell is a 38 y.o. female here for   Chief Complaint   Patient presents with    4 month follow up with labs       Last Visit: 9/22/23    Rheum Hx      Previous Tx  Meloxicam- some help  Steroids- helpful  Tizanidine- helps with with neck painv  Methotrexate- helpful, mostly tolerating    Health Maintenance  DXA- none  Malignancy Hx- none  Immunization History   Administered Date(s) Administered    Flu vaccine (IIV4), preservative free *Check age/dose* 09/30/2019    Pneumococcal polysaccharide vaccine, 23-valent, age 2 years and older (PNEUMOVAX 23) 10/15/2019    Tdap vaccine, age 7 year and older (BOOSTRIX) 03/07/2011, 10/15/2016          Past Medical History:   Diagnosis Date    Anxiety     Exertional shortness of breath 06/07/2023    Foot swelling 06/07/2023    Kidney stones     Lupus (CMS/HCC)     dx 09/2013    Mastodynia 06/07/2023    Other chest pain 01/10/2014    Atypical chest pain    Other conditions influencing health status 04/18/2016    Skin Lesion    Other muscle spasm 02/19/2016    Spasm of cervical paraspinous muscle    Pain in unspecified ankle and joints of unspecified foot 01/27/2015    Ankle joint pain    Pain in unspecified limb 07/29/2016    Limb pain    Pain in unspecified upper arm 07/29/2016    Pain in axilla    Personal history of diseases of the skin and subcutaneous tissue 02/20/2014    History of urticaria    Personal history of other diseases of the musculoskeletal system and connective tissue 12/31/2013    History of low back pain    Personal history of other diseases of the nervous system and sense organs 02/19/2016    History of tension headache    Personal history of other diseases of the respiratory system 02/11/2014    History of sinusitis    Personal history of other endocrine,  nutritional and metabolic disease 2015    History of vitamin D deficiency    Personal history of other endocrine, nutritional and metabolic disease 2016    History of Hashimoto thyroiditis    Personal history of other endocrine, nutritional and metabolic disease 2016    History of hypokalemia    Personal history of other infectious and parasitic diseases 2016    History of herpes zoster    Personal history of other specified conditions 2014    History of urinary frequency    Personal history of other specified conditions 2021    History of abdominal pain    Personal history of other specified conditions 2021    History of nausea    Personal history of urinary (tract) infections 2014    History of urinary tract infection    Right upper quadrant pain 2013    Abdominal pain, RUQ (right upper quadrant)    Sleep disorder 2023    Strain of muscle, fascia and tendon at neck level, initial encounter 2016    Cervical strain    Swelling of both hands 2023    Tachycardia     Thyroid cancer (CMS/HCC)       Past Surgical History:   Procedure Laterality Date    BLADDER SUSPENSION  2021    bladder sling     SECTION, CLASSIC  06/27/2013    x3    CHOLECYSTECTOMY  2013    Cholecystectomy Laparoscopic    COLONOSCOPY  2021    GALLBLADDER SURGERY  2016    Gallbladder Surgery    HYSTERECTOMY  2022    OTHER SURGICAL HISTORY      GASTRO POLYPS REMOVED    OTHER SURGICAL HISTORY  2021    ABLATION    TOTAL THYROIDECTOMY  2016    Thyroid Surgery Total Thyroidectomy    TUBAL LIGATION  04/10/2013    Tubal Ligation      Current Outpatient Medications   Medication Sig Dispense Refill    Ajovy Autoinjector 225 mg/1.5 mL auto-injector Inject 1 Pen (225 mg) under the skin every 30 (thirty) days.      cyanocobalamin (Vitamin B-12) 100 mcg tablet Take 1 tablet (100 mcg) by mouth once daily.      escitalopram (Lexapro) 20 mg tablet Take 1  "tablet (20 mg) by mouth once daily. 90 tablet 3    famotidine (Pepcid) 20 mg tablet Take 1 tablet (20 mg) by mouth 2 times a day. 180 tablet 3    folic acid (Folvite) 1 mg tablet Take 1 tablet (1 mg) by mouth once daily.      levothyroxine (Synthroid, Levoxyl) 137 mcg tablet Take 1 tablet (137 mcg) by mouth once daily. 90 tablet 3    melatonin 10 mg capsule Take 1 capsule (10 mg) by mouth once daily at bedtime.      meloxicam (Mobic) 15 mg tablet Take 1 tablet (15 mg) by mouth once daily. 6 tabs once a week      methotrexate (Trexall) 2.5 mg tablet Take 1 tablet (2.5 mg total) by mouth 1 (one) time per week.      metoprolol succinate XL (Toprol-XL) 25 mg 24 hr tablet Take 1 tablet (25 mg) by mouth once daily. 90 tablet 3    oxyCODONE-acetaminophen (Percocet) 5-325 mg tablet Take 1 tablet by mouth every 6 hours if needed for moderate pain (4 - 6) or severe pain (7 - 10) for up to 7 days. Do not exceed 8 tablets per day. 28 tablet 0    promethazine (Phenergan) 25 mg tablet Take 1 tablet (25 mg) by mouth 2 times a day as needed for nausea. 60 tablet 0    rimegepant (NURTEC) 75 mg tablet,disintegrating Take 1 tablet (75 mg) by mouth once daily as needed (migraine). 10 tablet 11    traMADol (Ultram) 50 mg tablet Take 1 tablet (50 mg) by mouth every 8 hours.       No current facility-administered medications for this visit.      Allergies   Allergen Reactions    Hydroxychloroquine Other     \"suicidal ideation\"    Amoxicillin Hives and Rash    Adhesive Rash    Adhesive Tape-Silicones Rash    Caffeine Other and Palpitations    Hydromorphone Nausea/vomiting    Omeprazole Rash    Penicillins Rash    Sulfa (Sulfonamide Antibiotics) Swelling and Rash        Vitals:    01/22/24 1300   BP: (!) 136/94   BP Location: Right arm   Patient Position: Sitting   Pulse: 99   SpO2: 97%   Weight: 84.1 kg (185 lb 6.5 oz)     Pain Assessment Pain Score: 3, Pain Location: Jaw (jaw and stomach)     Rapid 3  Function Score (FN): 0.7  Pain Score " (PN) (0-10): 4.5  Patient Global (PTGL) (0-10): 2  Rapid3 Score: 7.2  RAPID3 Weighted Score: 2.4       Workup  Component      Latest Ref Rng 1/11/2024 1/20/2024   WBC      4.4 - 11.3 x10*3/uL 6.3  6.1    nRBC      0.0 - 0.0 /100 WBCs 0.0  0.0    RBC      4.00 - 5.20 x10*6/uL 3.70 (L)  3.87 (L)    HEMOGLOBIN      12.0 - 16.0 g/dL 13.1  13.7    HEMATOCRIT      36.0 - 46.0 % 38.5  39.8    MCV      80 - 100 fL 104 (H)  103 (H)    MCH      26.0 - 34.0 pg 35.4 (H)  35.4 (H)    MCHC      32.0 - 36.0 g/dL 34.0  34.4    RED CELL DISTRIBUTION WIDTH      11.5 - 14.5 % 15.2 (H)  14.7 (H)    Platelets      150 - 450 x10*3/uL 330  298    Neutrophils %      40.0 - 80.0 % 50.2  63.0    Immature Granulocytes %, Automated      0.0 - 0.9 % 0.3  0.2    Lymphocytes %      13.0 - 44.0 % 40.2  26.3    Monocytes %      2.0 - 10.0 % 7.0  8.7    Eosinophils %      0.0 - 6.0 % 1.8  1.3    Basophils %      0.0 - 2.0 % 0.5  0.5    Neutrophils Absolute      1.20 - 7.70 x10*3/uL 3.15  3.84    Immature Granulocytes Absolute, Automated      0.00 - 0.70 x10*3/uL 0.02  0.01    Lymphocytes Absolute      1.20 - 4.80 x10*3/uL 2.52  1.60    Monocytes Absolute      0.10 - 1.00 x10*3/uL 0.44  0.53    Eosinophils Absolute      0.00 - 0.70 x10*3/uL 0.11  0.08    Basophils Absolute      0.00 - 0.10 x10*3/uL 0.03  0.03    GLUCOSE      65 - 99 mg/dL 77  112 (H)    SODIUM      133 - 145 mmol/L 136  137    POTASSIUM      3.4 - 5.1 mmol/L 4.5  3.9    CHLORIDE      97 - 107 mmol/L 104  106    Bicarbonate      24 - 31 mmol/L 26  21 (L)    Anion Gap      <=19 mmol/L 11  10    Blood Urea Nitrogen      8 - 25 mg/dL 20  17    Creatinine      0.40 - 1.60 mg/dL 0.66  0.60    EGFR      >60 mL/min/1.73m*2 >90  >90    Calcium      8.5 - 10.4 mg/dL 9.2  9.4    Albumin      3.5 - 5.0 g/dL 4.5  4.3    Alkaline Phosphatase      35 - 125 U/L 42  47    Total Protein      5.9 - 7.9 g/dL 6.9  7.2    AST      5 - 40 U/L 16  13    Bilirubin Total      0.1 - 1.2 mg/dL 0.4  0.2    ALT       5 - 40 U/L 28  18    Color, Urine      Straw, Yellow  Yellow     Appearance, Urine      Clear  Hazy ! (N)     Specific Gravity, Urine      1.005 - 1.035  1.031     pH, Urine      5.0, 5.5, 6.0, 6.5, 7.0, 7.5, 8.0  5.0     Protein, Urine      NEGATIVE mg/dL 30 (1+) ! (N)     Glucose, Urine      NEGATIVE mg/dL NEGATIVE     Blood, Urine      NEGATIVE  NEGATIVE     Ketones, Urine      NEGATIVE mg/dL NEGATIVE     Bilirubin, Urine      NEGATIVE  SMALL (1+) !     Urobilinogen, Urine      <2.0 mg/dL 2.0 ! (N)     Nitrite, Urine      NEGATIVE  NEGATIVE     Leukocyte Esterase, Urine      NEGATIVE  NEGATIVE     WBC, Urine      1-5, NONE /HPF NONE     RBC, Urine      NONE, 1-2, 3-5 /HPF 6-10 !     Mucus, Urine      Reference range not established. /LPF 4+     Amorphous Crystals, Urine      NONE, 1+, 2+ /HPF 3+ !     C3 Complement      87 - 200 mg/dL 149     C4 Complement      10 - 50 mg/dL 34     Creatine Kinase      0 - 215 U/L 36     C-Reactive Protein      <1.00 mg/dL 0.17     Sed Rate      0 - 20 mm/h 6     Vitamin D, 25-Hydroxy, Total      30 - 100 ng/mL 16 (L)     Vit D, 1,25-Dihydroxy      19.9 - 79.3 pg/mL 62.3     Thyroid Stimulating Hormone      0.44 - 3.98 mIU/L 2.42     Thyroxine, Free      0.61 - 1.12 ng/dL 0.97     HCG, Beta-Quantitative      SEE COMMENT BELOW mIU/mL  <1       Assessment/Plan  1. Sjogren syndrome with myopathy (CMS/HCC)    2. Polyarthritis with negative rheumatoid factor (CMS/HCC)    3. Positive MARK (antinuclear antibody)    4. Vitamin D deficiency    5. Encounter for long-term (current) use of medications    6. Constipation, unspecified constipation type    7. Other chronic pain    8. History of nausea       No orders of the defined types were placed in this encounter.     Since last appt, adherent and tolerating methotrexate  Diagnosed with POTS by PCP - no tx  Denies any recent or current infection.  Not on any glucocorticoids.  ROS+ for fatigue, sicca, sore throat, GERD, abd pain, rashes,  joint pain, weakness hands/legs, anxiety  Rapid 3 consistent with moderate severity  Labs reviewed. Low vit D  D/w pt tx options and decided on  Continue methotrexate and folic acid.  Colace  Phenergan prn  Pain med- pt would like to wean off- suggested tapering schedule given to pt.  Monitor labs- recheck B12; macrocytosis likely methotrexate related but also with known B12 def since surgery/ileum resection.  Hydrate well- given U/A concentrated and crystals with hx of kidney stones.   Avoid oral calcium supplements  Wean off NSAID and only prn  Advised of possible side effects and importance of monitoring.   All questions answered.  Patient to follow up with primary care provider regarding all other medical issues not addressed today and for medical chart updating.    Jolly Conteh MD      Patient Care Team:  MAYELIN Campo-CNP as PCP - [facility] PCP  Jolly Conteh MD as Consulting Physician (Rheumatology)

## 2024-01-22 NOTE — ED PROVIDER NOTES
HPI   Chief Complaint   Patient presents with    Constipation     For the past 5 days I have not had a bm I usually have 1 bm a day I have bloating anlower abd cramping and I have wiped blood from my anus        9-year-old female presents with constipation.  Patient states she has not had a bowel movement the past 5 days.  Notes diffuse abdominal pain.  No vomiting.  Has been able to tolerate oral intake but has a decreased appetite.  Notes blood on the tissue earlier today with wiping, minimal blood in the toilet.                          No data recorded                Patient History   Past Medical History:   Diagnosis Date    Anxiety     Exertional shortness of breath 06/07/2023    Foot swelling 06/07/2023    Kidney stones     Lupus (CMS/HCC)     dx 09/2013    Mastodynia 06/07/2023    Other chest pain 01/10/2014    Atypical chest pain    Other conditions influencing health status 04/18/2016    Skin Lesion    Other muscle spasm 02/19/2016    Spasm of cervical paraspinous muscle    Pain in unspecified ankle and joints of unspecified foot 01/27/2015    Ankle joint pain    Pain in unspecified limb 07/29/2016    Limb pain    Pain in unspecified upper arm 07/29/2016    Pain in axilla    Personal history of diseases of the skin and subcutaneous tissue 02/20/2014    History of urticaria    Personal history of other diseases of the musculoskeletal system and connective tissue 12/31/2013    History of low back pain    Personal history of other diseases of the nervous system and sense organs 02/19/2016    History of tension headache    Personal history of other diseases of the respiratory system 02/11/2014    History of sinusitis    Personal history of other endocrine, nutritional and metabolic disease 01/27/2015    History of vitamin D deficiency    Personal history of other endocrine, nutritional and metabolic disease 05/02/2016    History of Hashimoto thyroiditis    Personal history of other endocrine, nutritional and  metabolic disease 2016    History of hypokalemia    Personal history of other infectious and parasitic diseases 2016    History of herpes zoster    Personal history of other specified conditions 2014    History of urinary frequency    Personal history of other specified conditions 2021    History of abdominal pain    Personal history of other specified conditions 2021    History of nausea    Personal history of urinary (tract) infections 2014    History of urinary tract infection    Right upper quadrant pain 2013    Abdominal pain, RUQ (right upper quadrant)    Sleep disorder 2023    Strain of muscle, fascia and tendon at neck level, initial encounter 2016    Cervical strain    Swelling of both hands 2023    Tachycardia     Thyroid cancer (CMS/HCC)      Past Surgical History:   Procedure Laterality Date    BLADDER SUSPENSION  2021    bladder sling     SECTION, CLASSIC  06/27/2013    x3    CHOLECYSTECTOMY  2013    Cholecystectomy Laparoscopic    COLONOSCOPY  2021    GALLBLADDER SURGERY  2016    Gallbladder Surgery    HYSTERECTOMY  2022    OTHER SURGICAL HISTORY      GASTRO POLYPS REMOVED    OTHER SURGICAL HISTORY  2021    ABLATION    TOTAL THYROIDECTOMY  2016    Thyroid Surgery Total Thyroidectomy    TUBAL LIGATION  04/10/2013    Tubal Ligation     No family history on file.  Social History     Tobacco Use    Smoking status: Every Day     Types: Cigarettes    Smokeless tobacco: Never   Vaping Use    Vaping Use: Former   Substance Use Topics    Alcohol use: Not Currently    Drug use: Never       Physical Exam   ED Triage Vitals [24 1030]   Temp Heart Rate Respirations BP   36.7 °C (98.1 °F) 95 18 (!) 127/96      Pulse Ox Temp Source Heart Rate Source Patient Position   99 % Oral Monitor Sitting      BP Location FiO2 (%)     Left arm --       Physical Exam  Vitals and nursing note reviewed. Exam conducted with a  chaperone present.   Constitutional:       General: She is not in acute distress.     Appearance: She is not ill-appearing.   HENT:      Head: Normocephalic and atraumatic.      Mouth/Throat:      Mouth: Mucous membranes are moist.      Pharynx: Oropharynx is clear.   Eyes:      Extraocular Movements: Extraocular movements intact.      Conjunctiva/sclera: Conjunctivae normal.      Pupils: Pupils are equal, round, and reactive to light.   Cardiovascular:      Rate and Rhythm: Normal rate and regular rhythm.   Pulmonary:      Effort: Pulmonary effort is normal. No respiratory distress.      Breath sounds: Normal breath sounds.   Abdominal:      General: There is no distension.      Palpations: Abdomen is soft.      Tenderness: There is abdominal tenderness. There is no guarding or rebound.   Genitourinary:     Rectum: Guaiac result positive. External hemorrhoid present.   Musculoskeletal:         General: No swelling or deformity. Normal range of motion.      Cervical back: Normal range of motion and neck supple.   Skin:     General: Skin is warm and dry.      Capillary Refill: Capillary refill takes less than 2 seconds.   Neurological:      General: No focal deficit present.      Mental Status: She is alert and oriented to person, place, and time. Mental status is at baseline.   Psychiatric:         Mood and Affect: Mood normal.         Behavior: Behavior normal.         ED Course & MDM   Diagnoses as of 01/22/24 1514   Drug-induced constipation       Medical Decision Making  38 y.o. female presents with abdominal pain and constipation.  Considered constipation, cholecystitis, choledocholithiasis, pancreatitis, PUD, perforated bowel, mesenteric ischemia, colitis, IBD, small bowel obstruction, AAA, ACS, appendicitis, diverticulitis, volvulus.  CT reveals no intra-abdominal mass, no bowel obstruction, moderate amount of stool.  Patient has recently been on opiates for pain control.  Digital rectal exam performed,  evidence of large stool burden in the rectal vault but unable to manually extract.  Patient has large external hemorrhoids with some blood noted on stool exam but no overt melena, no brisk bleeding.  No significant anemia noted on labs, less likely to be GI bleed.  Rectal bleeding most likely secondary to constipation and hemorrhoids.  Patient given a enema with positive results.  Patient responded well to treatments in the emergency department and feels comfortable going home to follow up with primary care doctor.         Procedure  Procedures     Nelly Bar MD  01/22/24 8847

## 2024-01-23 DIAGNOSIS — M06.09 POLYARTHRITIS WITH NEGATIVE RHEUMATOID FACTOR (MULTI): ICD-10-CM

## 2024-01-23 DIAGNOSIS — G89.29 OTHER CHRONIC PAIN: ICD-10-CM

## 2024-01-23 DIAGNOSIS — Z87.898 HISTORY OF NAUSEA: ICD-10-CM

## 2024-01-23 NOTE — TELEPHONE ENCOUNTER
Patient called stated she would like you to change the prescription for Promethazine to 3 times a day because she is using it a little more and would like to have them on hand if she has to use three times a day. And she also called about her percocet that was already sent to the pharmacy for future date Please advise

## 2024-01-24 RX ORDER — PROMETHAZINE HYDROCHLORIDE 25 MG/1
25 TABLET ORAL EVERY 8 HOURS PRN
Qty: 90 TABLET | Refills: 3 | Status: SHIPPED | OUTPATIENT
Start: 2024-01-24

## 2024-01-24 RX ORDER — OXYCODONE AND ACETAMINOPHEN 5; 325 MG/1; MG/1
1 TABLET ORAL EVERY 6 HOURS PRN
Qty: 28 TABLET | Refills: 0 | Status: SHIPPED | OUTPATIENT
Start: 2024-01-24 | End: 2024-02-08 | Stop reason: SDUPTHER

## 2024-01-24 NOTE — TELEPHONE ENCOUNTER
PT CALLED AGAIN TODAY ASKING FOR SAME MED CHANGE & ASKING FOR REFILL OF PERCOCET THAT IS DUE TODAY.

## 2024-02-02 ENCOUNTER — TELEPHONE (OUTPATIENT)
Dept: RHEUMATOLOGY | Facility: CLINIC | Age: 39
End: 2024-02-02
Payer: COMMERCIAL

## 2024-02-02 DIAGNOSIS — G89.29 OTHER CHRONIC PAIN: ICD-10-CM

## 2024-02-02 DIAGNOSIS — M06.09 POLYARTHRITIS WITH NEGATIVE RHEUMATOID FACTOR (MULTI): ICD-10-CM

## 2024-02-02 NOTE — TELEPHONE ENCOUNTER
"PT LEFT VM, STATES SHE SPOKE WITH KRIS ON TUES TO LET YOU KNOW THAT SHE SAW DENTIST & HAS A TOOTH INFECTION & NEEDS FURTHER WORK, STATES DENTIST PUT HER ON ABX, BUT WILL NOT ORDER PAIN MEDS. HAS APPT FOR FURTHER DENTAL WORK 2/22/24. STATES SHE HAS BEEN TAKING ALEAVE & MOTRIN FOR PAIN & NOW IS HAVING STOMACH ISSUES. ALSO STATES SHE HAS MISSED \"A COUPLE\" methotrexate INJECTIONS & IS IN A LOT OF PAIN FROM FIBROMYALGIA & GOING TO THE GYM. ASKING YOU TO SEND A MONTH'S WORTH OF PERCOCET IN. ALSO STATES SHE WILL BE TAKING methotrexate TOMORROW NIGHT. PLEASE ADVISE  "

## 2024-02-08 RX ORDER — OXYCODONE AND ACETAMINOPHEN 5; 325 MG/1; MG/1
1 TABLET ORAL EVERY 6 HOURS PRN
Qty: 120 TABLET | Refills: 0 | Status: SHIPPED | OUTPATIENT
Start: 2024-02-09 | End: 2024-03-10

## 2024-02-08 RX ORDER — NALOXONE HYDROCHLORIDE 4 MG/.1ML
SPRAY NASAL
Qty: 2 EACH | Refills: 0 | Status: SHIPPED | OUTPATIENT
Start: 2024-02-08

## 2024-02-27 ENCOUNTER — TELEPHONE (OUTPATIENT)
Dept: PRIMARY CARE | Facility: CLINIC | Age: 39
End: 2024-02-27
Payer: COMMERCIAL

## 2024-02-27 ENCOUNTER — TELEPHONE (OUTPATIENT)
Dept: RHEUMATOLOGY | Facility: CLINIC | Age: 39
End: 2024-02-27
Payer: COMMERCIAL

## 2024-02-27 DIAGNOSIS — G89.29 OTHER CHRONIC PAIN: ICD-10-CM

## 2024-02-27 DIAGNOSIS — M06.09 POLYARTHRITIS WITH NEGATIVE RHEUMATOID FACTOR (MULTI): Primary | ICD-10-CM

## 2024-02-27 NOTE — TELEPHONE ENCOUNTER
PT'S  LEFT , STATES THAT PT IS ON TRAMADOL & PERCOCET. STATES THAT TR AMDOL CAUSES PT TO BE NAUSEOUS & PERCOCET MAKES HER VERY TIRED.  IS ASKING YOU TO ORDER VICODIN INSTEAD. PLEASE ADVISE

## 2024-02-27 NOTE — TELEPHONE ENCOUNTER
"Zuleyma called yesterday asking if Beth would be able to refill her Percocet RX for her because she has a difficult time getting in contact with her Rheumatologist who prescribes it. States that she was told she can take an \"extra\" pill when she has increased pain/discomfort. I spoke with Beth, she states that she can not refill the medication because it is a controlled script and she has a contract with Rheumatology. If she is having increased pain and or discomfort she will need to contact their office and discuss her different treatment options. I called her to let her know this information, but she did not answer, so I left her a DETAILED VM with this information.   "

## 2024-03-01 NOTE — TELEPHONE ENCOUNTER
Patient called to follow up on her husbands call she was hoping to hear something before the weekend.

## 2024-03-04 RX ORDER — HYDROCODONE BITARTRATE AND ACETAMINOPHEN 5; 325 MG/1; MG/1
1 TABLET ORAL EVERY 6 HOURS PRN
Qty: 28 TABLET | Refills: 0 | Status: SHIPPED | OUTPATIENT
Start: 2024-03-04 | End: 2024-03-04 | Stop reason: SDUPTHER

## 2024-03-04 RX ORDER — HYDROCODONE BITARTRATE AND ACETAMINOPHEN 5; 325 MG/1; MG/1
1 TABLET ORAL EVERY 6 HOURS PRN
Qty: 28 TABLET | Refills: 0 | Status: SHIPPED | OUTPATIENT
Start: 2024-03-08 | End: 2024-03-25 | Stop reason: SDUPTHER

## 2024-03-20 ENCOUNTER — TELEPHONE (OUTPATIENT)
Dept: RHEUMATOLOGY | Facility: CLINIC | Age: 39
End: 2024-03-20
Payer: COMMERCIAL

## 2024-03-20 DIAGNOSIS — G89.29 OTHER CHRONIC PAIN: ICD-10-CM

## 2024-03-20 DIAGNOSIS — B99.9 INFECTION: Primary | ICD-10-CM

## 2024-03-20 DIAGNOSIS — M06.09 POLYARTHRITIS WITH NEGATIVE RHEUMATOID FACTOR (MULTI): ICD-10-CM

## 2024-03-20 NOTE — TELEPHONE ENCOUNTER
"PT CALLED,C/O SWOLLEN LYMPH NODE ON LEFT SIDE OF UNDER JAW. STATES SHE CAN BARELY MOVE HER NECK. ASKING FOR \"SOMETHING\" FOR THIS? OR SHOULD SHE SEE PCP?  "

## 2024-03-21 NOTE — TELEPHONE ENCOUNTER
"PT'S  CALLED TODAY ASKING FOR ABX BECAUSE PT HAS SORE THROAT & SWOLLEN LYMPH NODE. ALSO STATES SHE NEEDS SOMETHING FOR PAIN, BUT NOT TRAMADOL BECAUSE THIS MAKES HER \"SICK\".  "

## 2024-03-25 RX ORDER — HYDROCODONE BITARTRATE AND ACETAMINOPHEN 5; 325 MG/1; MG/1
1 TABLET ORAL EVERY 6 HOURS PRN
Qty: 28 TABLET | Refills: 0 | Status: SHIPPED | OUTPATIENT
Start: 2024-03-25 | End: 2024-04-01

## 2024-03-25 RX ORDER — DOXYCYCLINE 100 MG/1
100 CAPSULE ORAL 2 TIMES DAILY
Qty: 20 CAPSULE | Refills: 0 | Status: SHIPPED | OUTPATIENT
Start: 2024-03-25 | End: 2024-04-04

## 2024-03-28 DIAGNOSIS — G89.29 OTHER CHRONIC PAIN: Primary | ICD-10-CM

## 2024-03-29 DIAGNOSIS — M06.09 POLYARTHRITIS WITH NEGATIVE RHEUMATOID FACTOR (MULTI): Primary | ICD-10-CM

## 2024-03-29 RX ORDER — TRAMADOL HYDROCHLORIDE 50 MG/1
50 TABLET ORAL EVERY 8 HOURS PRN
Qty: 90 TABLET | Refills: 0 | Status: SHIPPED | OUTPATIENT
Start: 2024-03-29 | End: 2024-04-05

## 2024-03-29 NOTE — TELEPHONE ENCOUNTER
"PT CALLED, STATES EARLIER THIS WEEK YOU GAVE HER DOXYCYCLINE & VICODIN (3/25/24 #28), NOW ASKING FOR REFILL OF TRAMADOL BECAUSE \"LUMP\" IS SMALLER & LESS PAINFUL. STATES SHE CAN THE \"SAVE VICODIN FOR LATER\".  "

## 2024-04-02 RX ORDER — MELOXICAM 15 MG/1
15 TABLET ORAL DAILY
Qty: 30 TABLET | Refills: 11 | Status: SHIPPED | OUTPATIENT
Start: 2024-04-02 | End: 2024-05-29

## 2024-04-11 DIAGNOSIS — Z79.899 ENCOUNTER FOR LONG-TERM (CURRENT) USE OF MEDICATIONS: Primary | ICD-10-CM

## 2024-04-11 RX ORDER — FOLIC ACID 1 MG/1
TABLET ORAL
Qty: 30 TABLET | Refills: 11 | Status: SHIPPED | OUTPATIENT
Start: 2024-04-11 | End: 2024-05-29 | Stop reason: WASHOUT

## 2024-04-15 ENCOUNTER — TELEPHONE (OUTPATIENT)
Dept: RHEUMATOLOGY | Facility: CLINIC | Age: 39
End: 2024-04-15
Payer: COMMERCIAL

## 2024-04-15 DIAGNOSIS — M06.09 POLYARTHRITIS WITH NEGATIVE RHEUMATOID FACTOR (MULTI): Primary | ICD-10-CM

## 2024-04-15 DIAGNOSIS — G89.29 OTHER CHRONIC PAIN: ICD-10-CM

## 2024-04-15 NOTE — TELEPHONE ENCOUNTER
"PT CALLED ASKING FOR HIGHER DOSE OF TRAMADOL OR VICODIN 5/325 STATES SHE \"OVER DID IT\" THIS WEEKEND WITH THE NICE WEATHER & NOW HER SHOULDER HURT & CANNOT CLOSE HANDS DUE TO PULLING WEEDS. STATES SHE IS OUT OF PAIN MEDS BECAUSE SHE WAS \"DOUBLING UP\" ON TRAMADOL & RAN OUT.   "

## 2024-04-16 NOTE — TELEPHONE ENCOUNTER
"PT CALLED AGAIN & IS OUT OF MEDS & HURTING ALL OVER. ASKING TO PLEASE CALL \"SOMETHING \"IN.  INFORMED PT THAT DR HAS MESSAGE FROM YESTERDAY & NOW ADDING TO MESSAGE.  "

## 2024-04-17 RX ORDER — TRAMADOL HYDROCHLORIDE 50 MG/1
100 TABLET ORAL EVERY 8 HOURS PRN
Qty: 180 TABLET | Refills: 0 | Status: SHIPPED | OUTPATIENT
Start: 2024-04-17 | End: 2024-05-29 | Stop reason: WASHOUT

## 2024-04-17 NOTE — TELEPHONE ENCOUNTER
Sent new rx-max dose tramadol. It is up to pharmacy to dispense since it is early although dose change.

## 2024-04-19 RX ORDER — METHYLPREDNISOLONE 4 MG/1
TABLET ORAL
Qty: 21 TABLET | Refills: 0 | Status: SHIPPED | OUTPATIENT
Start: 2024-04-19 | End: 2024-05-29 | Stop reason: WASHOUT

## 2024-04-19 NOTE — TELEPHONE ENCOUNTER
Worker's Compensation Follow Up Office Visit    Date of Visit:  9/9/2022  Employer:  [employer]  Date of Injury:  8/29/2022    CC:    Chief Complaint   Patient presents with   • Office Visit   • Worker's Compensation     WRF- RT ARM/SHOULDER DOI [phone]       HISTORY OF PRESENT INJURY/ILLNESS  Trina Garcia is a 39 year old female, who presents with a complaint of an injury/illness that reportedly occurred during work activities.  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    Mechanism of Injury:  Patient states that the initial date of injury was on 8/29/2022.  At that time, patient states that she was at work. Patient says sustained injury to RIGHT SHOULDER, RIGHT UPPER ARM.   This happened when she lifted a 15-20 lb bag full of silicone tubes with both hands out of a cart and for this she had to bend to the bottom of the cart, and lifted the back with outstretched arms. At that time she felt a sharp pain at the right deltoid area. This was in am, and by nighttime the pain increased and had to take Tylenol with little relief of pain.   Pain at its worst: 10/10 last night, at its best is 8/10 at rest.  Has limited movement of shoulder: YES: due to pain with abduction more than flexion   Numbness and tingling to hand/finger(s): NO   Weakness at shoulder: YES: due to pain, but can still move it.   Bruise or bleed easily: NO   ( HPI copied pasted and modified from Dr. Daly).     Today, patient says that there is good improvement in symptoms of RIGHT SHOULDER and ARM   Has limited movement of shoulder: No        Numbness and tingling to hand/finger(s): No        Weakness at shoulder: No        Decreased range of motion at shoulder: No      ..  Feels 100% improved.  Pain Management: ice, heat and biofreeze. She treated with tylenol and ibuprofen for 1-2 days but has needed nothing since.   Has been working well with restrictions.    She denies any other precipitating event or  PT LEFT ANOTHER VM ASKING FOR A DIFFERENT PAIN MED UNTIL SHE CAN GET REFILL OF TRAMADOL   activity.  She has no history of similar problems unless otherwise mentioned above.    CURRENT MEDICATIONS:  Medications relevant to this injury include:  See HPI unless otherwise listed below.    PAST HISTORY:   I have reviewed the patient's medications and allergies, past medical, surgical, social and family history, updating these as appropriate.  See Histories section of the EMR for a display of this information.    Review of Systems      Vitals:    09/09/22 1433   Pulse: 84       PHYSICAL EXAM     Physical Exam  Vitals and nursing note reviewed.   Constitutional:       Appearance: Normal appearance.   HENT:      Neck: Normal range of motion. No rigidity or tenderness. No pain with movement or muscular tenderness.   Neck:      Comments: Negative Lhermitte and Spurling tests.  Musculoskeletal:      Right shoulder: No swelling, deformity, tenderness or bony tenderness. Normal range of motion. Normal strength.      Comments: Right shoulder examination reveals no deformity on inspection.  Active ROM abduction 170 degrees, flexion 170 degrees,  Strength is 5/5 in above directions.  Empty can test is negative for drop arm, and negative for pain with resisted movement.  negative Speed test.  negative Barrera test.  negative Arm crossover test.     Skin:     General: Skin is warm and dry.      Capillary Refill: Capillary refill takes less than 2 seconds.      Findings: No bruising.   Neurological:      General: No focal deficit present.      Mental Status: She is alert and oriented to person, place, and time.      Sensory: No sensory deficit.   Psychiatric:         Mood and Affect: Mood and affect normal.         Behavior: Behavior normal. Behavior is cooperative.         Thought Content: Thought content normal.         PROCEDURE         ASSESSMENT & PLAN     DIAGNOSIS:   1. Strain of right shoulder, subsequent encounter      This injury is resolved.     STATUS: This injury is determined to be WORK RELATED.    RETURN TO  WORK: Employee may return to work without restrictions.Employee is discharged from care. Return Date: 9/9/2022       Reached maximum medical improvement as of 9/9/2022     RESTRICTIONS: Restrictions are to be followed at work and at home.  Restrictions are in effect until next follow-up visit.  None     TREATMENT PLAN:   Medications for this injury/condition: None   Referral/Consult: None  Diagnostic Testing: None       Instructions: None     NEXT RETURN VISIT: None needed.   Thank you for the privilege of providing medical care for this injury/condition.  If there are any questions, please call the occupational health clinic at Dept: [phone].    Electronically signed on 9/9/2022 at 2:41 PM by:   ANATOLIY Waterman   [facility]    The physician below agrees with the plan and restrictions placed on the patient by the provider above.  Samira Daly MD

## 2024-04-19 NOTE — TELEPHONE ENCOUNTER
PT HAD CALLED ON 4/18/24 STATING PHARMACY WOULD NOT FILL TRAMADOL FOR 5 MORE DAYS. PT ASKING FOR DR WINN TO CLL & RELEASE MEDS. DISCUSSED WITH DR WINN & SHE CHECKED [facility] & EVEN WITH PT INCREASING HER MEDS SHE SHOULD HAVE 5 DAYS LEFT & CANNOT FILL UNTIL THEN.. LEFT VM APOLOGIZING TO PT THAT DR WINN HAS TO FOLLOW THE DIRECTIONS OF THE [facility] BOARD & SHE MUST WAIT THE 5 DAYS TO  REFILL TRAMADOL. PT'S  CALLED SHORTLY AFTER MESSAGE WAS LEFT, STATING PT NEEDS HER MED, EXPLAINED TO HIM THAT I SPOKE WITH DR WINN & SHE SAID DUE TO PHARMACY BOARD REGULATION PT HAS TO WAIT 5 DAYS FOR REFILL. HE HUNG UP.

## 2024-05-09 ENCOUNTER — TELEPHONE (OUTPATIENT)
Dept: RHEUMATOLOGY | Facility: CLINIC | Age: 39
End: 2024-05-09
Payer: COMMERCIAL

## 2024-05-09 NOTE — TELEPHONE ENCOUNTER
"PT CALLED, GET INJECTION MONTHLY FOR MIGRAINES & IS BEHIND TIME WISE DUE TO PHARMACY BACK ORDER. PT STATES SHE HAS A \"TERRIBLE\"\" MIGRAINE TODAY & ASKING WHAT SHE CAN TAKE WITH TRAMADOL. \"NOTHING\" IS WORKING, TOOK IBUPROFEN WITH NO RELIEF. PLEASE ADVISE  "

## 2024-05-16 DIAGNOSIS — G89.29 OTHER CHRONIC PAIN: ICD-10-CM

## 2024-05-16 DIAGNOSIS — M06.09 POLYARTHRITIS WITH NEGATIVE RHEUMATOID FACTOR (MULTI): ICD-10-CM

## 2024-05-16 RX ORDER — OXYCODONE AND ACETAMINOPHEN 5; 325 MG/1; MG/1
1 TABLET ORAL EVERY 6 HOURS PRN
Qty: 28 TABLET | Refills: 0 | Status: SHIPPED | OUTPATIENT
Start: 2024-05-16 | End: 2024-05-24 | Stop reason: SDUPTHER

## 2024-05-21 ENCOUNTER — TELEPHONE (OUTPATIENT)
Dept: RHEUMATOLOGY | Facility: CLINIC | Age: 39
End: 2024-05-21

## 2024-05-21 ENCOUNTER — LAB (OUTPATIENT)
Dept: LAB | Facility: LAB | Age: 39
End: 2024-05-21
Payer: COMMERCIAL

## 2024-05-21 DIAGNOSIS — R76.8 POSITIVE ANA (ANTINUCLEAR ANTIBODY): ICD-10-CM

## 2024-05-21 DIAGNOSIS — D75.89 MACROCYTOSIS WITHOUT ANEMIA: ICD-10-CM

## 2024-05-21 DIAGNOSIS — R53.83 OTHER FATIGUE: ICD-10-CM

## 2024-05-21 DIAGNOSIS — M06.09 POLYARTHRITIS WITH NEGATIVE RHEUMATOID FACTOR (MULTI): ICD-10-CM

## 2024-05-21 DIAGNOSIS — E55.9 VITAMIN D DEFICIENCY: ICD-10-CM

## 2024-05-21 DIAGNOSIS — R76.8 POSITIVE ANA (ANTINUCLEAR ANTIBODY): Primary | ICD-10-CM

## 2024-05-21 DIAGNOSIS — Z79.899 ENCOUNTER FOR LONG-TERM (CURRENT) USE OF MEDICATIONS: ICD-10-CM

## 2024-05-21 LAB
25(OH)D3 SERPL-MCNC: 23 NG/ML (ref 31–100)
ALBUMIN SERPL-MCNC: 4.7 G/DL (ref 3.5–5)
ALP BLD-CCNC: 55 U/L (ref 35–125)
ALT SERPL-CCNC: 35 U/L (ref 5–40)
ANION GAP SERPL CALC-SCNC: 12 MMOL/L
AST SERPL-CCNC: 22 U/L (ref 5–40)
BASOPHILS # BLD AUTO: 0.03 X10*3/UL (ref 0–0.1)
BASOPHILS NFR BLD AUTO: 0.5 %
BILIRUB SERPL-MCNC: 0.3 MG/DL (ref 0.1–1.2)
BUN SERPL-MCNC: 15 MG/DL (ref 8–25)
CALCIUM SERPL-MCNC: 9.4 MG/DL (ref 8.5–10.4)
CHLORIDE SERPL-SCNC: 101 MMOL/L (ref 97–107)
CK SERPL-CCNC: 44 U/L (ref 24–195)
CO2 SERPL-SCNC: 24 MMOL/L (ref 24–31)
CREAT SERPL-MCNC: 0.7 MG/DL (ref 0.4–1.6)
CRP SERPL-MCNC: <0.3 MG/DL (ref 0–2)
EGFRCR SERPLBLD CKD-EPI 2021: >90 ML/MIN/1.73M*2
EOSINOPHIL # BLD AUTO: 0.06 X10*3/UL (ref 0–0.7)
EOSINOPHIL NFR BLD AUTO: 1 %
ERYTHROCYTE [DISTWIDTH] IN BLOOD BY AUTOMATED COUNT: 14.5 % (ref 11.5–14.5)
ERYTHROCYTE [SEDIMENTATION RATE] IN BLOOD BY WESTERGREN METHOD: 25 MM/H (ref 0–20)
GLUCOSE SERPL-MCNC: 108 MG/DL (ref 65–99)
HCT VFR BLD AUTO: 39.7 % (ref 36–46)
HGB BLD-MCNC: 13.5 G/DL (ref 12–16)
IMM GRANULOCYTES # BLD AUTO: 0.01 X10*3/UL (ref 0–0.7)
IMM GRANULOCYTES NFR BLD AUTO: 0.2 % (ref 0–0.9)
LYMPHOCYTES # BLD AUTO: 1.64 X10*3/UL (ref 1.2–4.8)
LYMPHOCYTES NFR BLD AUTO: 27.7 %
MCH RBC QN AUTO: 34 PG (ref 26–34)
MCHC RBC AUTO-ENTMCNC: 34 G/DL (ref 32–36)
MCV RBC AUTO: 100 FL (ref 80–100)
MONOCYTES # BLD AUTO: 0.28 X10*3/UL (ref 0.1–1)
MONOCYTES NFR BLD AUTO: 4.7 %
NEUTROPHILS # BLD AUTO: 3.91 X10*3/UL (ref 1.2–7.7)
NEUTROPHILS NFR BLD AUTO: 65.9 %
NRBC BLD-RTO: 0 /100 WBCS (ref 0–0)
PLATELET # BLD AUTO: 408 X10*3/UL (ref 150–450)
POTASSIUM SERPL-SCNC: 3.5 MMOL/L (ref 3.4–5.1)
PROT SERPL-MCNC: 7.4 G/DL (ref 5.9–7.9)
RBC # BLD AUTO: 3.97 X10*6/UL (ref 4–5.2)
SODIUM SERPL-SCNC: 137 MMOL/L (ref 133–145)
VIT B12 SERPL-MCNC: 258 PG/ML (ref 211–946)
WBC # BLD AUTO: 5.9 X10*3/UL (ref 4.4–11.3)

## 2024-05-21 PROCEDURE — 82550 ASSAY OF CK (CPK): CPT

## 2024-05-21 PROCEDURE — 82607 VITAMIN B-12: CPT

## 2024-05-21 PROCEDURE — 86160 COMPLEMENT ANTIGEN: CPT

## 2024-05-21 PROCEDURE — 84481 FREE ASSAY (FT-3): CPT

## 2024-05-21 PROCEDURE — 84480 ASSAY TRIIODOTHYRONINE (T3): CPT

## 2024-05-21 PROCEDURE — 85652 RBC SED RATE AUTOMATED: CPT

## 2024-05-21 PROCEDURE — 36415 COLL VENOUS BLD VENIPUNCTURE: CPT

## 2024-05-21 PROCEDURE — 80053 COMPREHEN METABOLIC PANEL: CPT

## 2024-05-21 PROCEDURE — 86140 C-REACTIVE PROTEIN: CPT

## 2024-05-21 PROCEDURE — 85025 COMPLETE CBC W/AUTO DIFF WBC: CPT

## 2024-05-21 PROCEDURE — 83921 ORGANIC ACID SINGLE QUANT: CPT

## 2024-05-21 PROCEDURE — 84439 ASSAY OF FREE THYROXINE: CPT

## 2024-05-21 PROCEDURE — 82306 VITAMIN D 25 HYDROXY: CPT

## 2024-05-21 PROCEDURE — 86225 DNA ANTIBODY NATIVE: CPT

## 2024-05-21 PROCEDURE — 84443 ASSAY THYROID STIM HORMONE: CPT

## 2024-05-21 NOTE — TELEPHONE ENCOUNTER
Patient would like T-4 and TSH added to her labs she had them drawn 05/21/2024 in [place].  Patient stated need to call lab to add on

## 2024-05-22 LAB
C3 SERPL-MCNC: 164 MG/DL (ref 87–200)
C4 SERPL-MCNC: 36 MG/DL (ref 10–50)
DSDNA AB SER-ACNC: <1 IU/ML
T3 SERPL-MCNC: 94 NG/DL (ref 60–200)
T3FREE SERPL-MCNC: 2.2 PG/ML (ref 2.3–4.2)
T4 FREE SERPL-MCNC: 1.2 NG/DL (ref 0.9–1.7)
TSH SERPL DL<=0.05 MIU/L-ACNC: 4.11 MIU/L (ref 0.27–4.2)

## 2024-05-24 DIAGNOSIS — G89.29 OTHER CHRONIC PAIN: ICD-10-CM

## 2024-05-24 DIAGNOSIS — M06.09 POLYARTHRITIS WITH NEGATIVE RHEUMATOID FACTOR (MULTI): ICD-10-CM

## 2024-05-24 NOTE — TELEPHONE ENCOUNTER
PT LEFT  REQUESTING REFILL OF PERCOCET 5/325. STATES SHE SAW DENTIST &  IS ON ABX FOR TOOTH THAT NEEDS ROOT CANAL NEXT WEEK. C/O SIDE OF MOUTH BEING SORE.

## 2024-05-25 LAB — METHYLMALONATE SERPL-SCNC: <0.1 UMOL/L (ref 0–0.4)

## 2024-05-28 LAB — SCAN RESULT: NORMAL

## 2024-05-28 RX ORDER — OXYCODONE AND ACETAMINOPHEN 5; 325 MG/1; MG/1
1 TABLET ORAL EVERY 6 HOURS PRN
Qty: 28 TABLET | Refills: 0 | Status: SHIPPED | OUTPATIENT
Start: 2024-05-28 | End: 2024-06-03 | Stop reason: SDUPTHER

## 2024-05-29 ENCOUNTER — OFFICE VISIT (OUTPATIENT)
Dept: RHEUMATOLOGY | Facility: CLINIC | Age: 39
End: 2024-05-29
Payer: COMMERCIAL

## 2024-05-29 VITALS
DIASTOLIC BLOOD PRESSURE: 94 MMHG | SYSTOLIC BLOOD PRESSURE: 151 MMHG | OXYGEN SATURATION: 99 % | HEART RATE: 93 BPM | BODY MASS INDEX: 33.79 KG/M2 | HEIGHT: 63 IN | WEIGHT: 190.7 LBS

## 2024-05-29 DIAGNOSIS — R76.8 POSITIVE ANA (ANTINUCLEAR ANTIBODY): Primary | ICD-10-CM

## 2024-05-29 DIAGNOSIS — E55.9 VITAMIN D DEFICIENCY: ICD-10-CM

## 2024-05-29 DIAGNOSIS — G89.29 OTHER CHRONIC PAIN: ICD-10-CM

## 2024-05-29 DIAGNOSIS — M35.03 SJOGREN SYNDROME WITH MYOPATHY (MULTI): ICD-10-CM

## 2024-05-29 DIAGNOSIS — M06.09 POLYARTHRITIS WITH NEGATIVE RHEUMATOID FACTOR (MULTI): ICD-10-CM

## 2024-05-29 DIAGNOSIS — Z79.899 ENCOUNTER FOR LONG-TERM (CURRENT) USE OF MEDICATIONS: ICD-10-CM

## 2024-05-29 DIAGNOSIS — R53.83 OTHER FATIGUE: ICD-10-CM

## 2024-05-29 LAB — C1Q SERPL-MCNC: 10.2 MG/DL (ref 10.3–20.5)

## 2024-05-29 PROCEDURE — 99215 OFFICE O/P EST HI 40 MIN: CPT | Performed by: INTERNAL MEDICINE

## 2024-05-29 RX ORDER — ADALIMUMAB 40MG/0.4ML
KIT SUBCUTANEOUS
Qty: 2 EACH | Refills: 11 | Status: SHIPPED | OUTPATIENT
Start: 2024-05-29

## 2024-05-29 ASSESSMENT — ROUTINE ASSESSMENT OF PATIENT INDEX DATA (RAPID3)
ON A SCALE OF ONE TO TEN, HOW MUCH PAIN HAVE YOU HAD BECAUSE OF YOUR CONDITION OVER THE PAST WEEK?: 5
FN_SCORE: 1.7
IN_OUT_BED: WITH SOME DIFFICULTY
TURN_FAUCETS_OFF: WITHOUT ANY DIFFICULTY
WASH_DRY_BODY: WITHOUT ANY DIFFICULTY
DRESS_YOURSELF: WITHOUT ANY DIFFICULTY
IN_OUT_TRANSPORT: WITHOUT ANY DIFFICULTY
SUM OF QUESTIONS A TO J: 5
PICK_CLOTHES_OFF_FLOOR: WITHOUT ANY DIFFICULTY
SEVERITY_SCORE: 0
ON A SCALE OF ONE TO TEN, CONSIDERING ALL THE WAYS IN WHICH ILLNESS AND HEALTH CONDITIONS MAY AFFECT YOU AT THIS TIME, PLEASE INDICATE BELOW HOW YOU ARE DOING:: 3
FEELINGS_DEPRESSION: WITHOUT ANY DIFFICULTY
GOOD_NIGHTS_SLEEP: WITH MUCH DIFFICULTY
LIFT_CUP_TO_MOUTH: WITHOUT ANY DIFFICULTY
WALK_FLAT_GROUND: WITH SOME DIFFICULTY
FEELINGS_ANXIETY_NERVOUS: WITH SOME DIFFICULTY
WEIGHTED_TOTAL_SCORE: 3.23
TOTAL RAPID3 SCORE: 9.7
PARTIPATE_RECREATIONAL_ACTIVITIES: WITH SOME DIFFICULTY
ON A SCALE OF ONE TO TEN, CONSIDERING ALL THE WAYS IN WHICH ILLNESS AND HEALTH CONDITIONS MAY AFFECT YOU AT THIS TIME, PLEASE INDICATE BELOW HOW YOU ARE DOING:: 3
SEVERITY_SCORE: MODERATE SEVERITY (MS)
ON A SCALE OF ONE TO TEN, HOW MUCH PAIN HAVE YOU HAD BECAUSE OF YOUR CONDITION OVER THE PAST WEEK?: 5
WALK_KILOMETERS: WITH MUCH DIFFICULTY

## 2024-05-29 ASSESSMENT — ENCOUNTER SYMPTOMS
DEPRESSION: 0
OCCASIONAL FEELINGS OF UNSTEADINESS: 0
LOSS OF SENSATION IN FEET: 0

## 2024-05-29 ASSESSMENT — PAIN - FUNCTIONAL ASSESSMENT: PAIN_FUNCTIONAL_ASSESSMENT: 0-10

## 2024-05-29 ASSESSMENT — PAIN SCALES - GENERAL: PAINLEVEL_OUTOF10: 5 - MODERATE PAIN

## 2024-05-29 NOTE — PROGRESS NOTES
[facility]/  Rheumatology  [facility], [address]  [place] [address]  Phone: [phone]  Fax: [phone]    Rheumatology Progress Note 05/29/24    Zuleyma Farrell is a 39 y.o. female here for   Chief Complaint   Patient presents with    Follow up       Last Visit: 9/22/23    Rheum Hx  Referred by Dr. Milian for history of lupus, Sjogren's.  Chief complaint: Joint pain, back hip leg pain is the worst  Since 2017  Slow onset  Remittent course  Precipitating factors: Standing, walking too long  Associated symptoms: Fatigue, swelling, tingling  Better: Sitting, laying, pressure  Worse: Walking, standing, straining  Previous treatments:  Naproxensomewhat helpful  Ibuprofen somewhat helpful  Meloxicam no help  Acetaminophen no help  Steroids very helpful but makes her shaky  Norco very helpful  Plaquenil allergy agitation/suicidal ideation  Occupation: Homemaker  Seen by rheumatology before, no records available except for last note Dr. Guzmán  9/7 /2022. Per Dr. Guzmán, positive SSA deemed more likely indicative of lupus rather than  Sjogren's.  Per review of note positive MARK positive SSA diagnosed with subacute lupus per  dermatology Dr Anderson in 2014. Saw Dr. Kalpesh Cook and at [facility]?. Reaction to  Plaquenil. Not deemed to be a true allergy per Dr. Correa and advised to resume Plaquenil.  Patient declined and is here for yet another evaluation.  Took Plaquenil but had to discontinue due to allergy agitation/suicidal ideation.  Following with neuro for cervicalgia paresthesia and migraines. Treated with Ajovy and  tizanidine.  Tenderness and swelling of bilateral hands knees ankles  Tenderness elbows left shoulder area hip pelvic area lower back  Overall flulike symptoms  Left side worse headaches elbow pain, worsening leg pain with difficulty doing ADLs. Legs  shake coming down the steps.  Review of systems positive for:  Fever  Fatigue  Dry eyes dry mouth  Sore  throat  Nasal and mouth sores  Swollen tender lumps of neck  Palpitations-frequent, beta-blocker helps; seen by cardiology and diagnosed with  tachycardia  Swelling of the legs by the end of the day-ankles  GERD abdominal pain nausea vomiting diarrhea  History of kidney stones  History of anemia treated with oral iron; not requiring IV iron or blood transfusion  History of cancer  Rashes  Sun sensitivity  Joint pain swelling stiffness  Back pain that does not improve with rest  Fortical type issues  Myalgias  Weakness legs  Numbness tingling hands  History of thyroid disease  Anxiety  Family history positive for RA-mother and aunt: Hashimoto's-mother.    Component      Latest Ref Rng 9/21/2020 6/29/2022   MARK  POSITIVE… !    MARK Titer  1:160 (C)   MARK Pattern  Nuclear fine speckled… (C)   Rheumatoid Factor      0 - 20 IU/ML 10       Component      Latest Ref Rng 7/18/2023   Total Protein, Spe 6.4…    Albumin, SPE 3.9…    Alpha 1 Globulin 0.3…    Alpha 2 Globulin 0.6…    Beta Glob SPE 0.7…    Gamma Glob,Spe 1.0…    Protein Electrophoresis Comment NORMAL…    IMMUNOFIXATION INTERP NORMAL…    Transglutaminase IgA <1…    Transglutaminase IgG 9…    DEAMIDATED GLIADIN PEPTIDE IGA <1…    DEAMIDATED GLIADIN PEPTIDE IGG 1…    IgG Cardiolipin Ab 11.4…    IgM Cardiolipin Ab 0.4…    IgA Cardiolipin Ab <0.5…    Beta-2 Glyco 1 IgG 11.3…    Beta 2 Glyco 1 IgM 0.3…    Beta-2 Glyco 1 IgA <0.6…    PTT-LA 33.0…    DRVVT 37.1…    Lupus Anticoagulant Interpretation Neg   ANCA IFA Titer <1:20…    ANCA IFA Pattern None Detected…    UMPA Interpretation No M prot   Rheumatoid Factor      0 - 20 IU/ML 10    Angiotensin 1 Conv 23…    Anti-C1Q Ab, IgG (RDL) <20…    SSA ANTIBODIES >8.0… !    SSB ANTIBODIES <0.2…    HLA-B27 Dna Result NEGATIVE…    Citrulline Antibody, IgG <1…    Centromere Ab <0.2…    Anti-Chromatin <0.2…    NATALEE-1 ANTIBODY <0.2…    RNP Antibody <0.2…    RIBOSOMAL RNP AB <0.2…    SCL-70 ABS EIA <0.2…    SM Antibody <0.2…    KAYLIE,  Broad Spectrum Zeb Serum NEGATIVE Performed at [facility] [address] [place] [address]       Previous Tx  Meloxicam- some help  Tizanidine- helps with with neck pain  Methotrexate- helpful but had to discontinue due to N/V  Naproxen- somewhat helpful  Ibuprofen- somewhat helpful  Acetaminophen- no help  Steroids- very helpful but makes her shaky  Norco- very helpful  Plaquenil-allergy agitation/suicidal ideation  Sulfa allergy    Health Maintenance  DXA- none  Malignancy Hx- 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; LN clear  Component      Latest Ref Rng 5/4/2023   Lyme IgG Wblot NEGATIVE…    Lyme IgG Bands p 93…    Lyme IgM Wblot NEGATIVE…    Lyme IgM Bands No Bands Seen    Lyme Interp Negative     Component      Latest Ref Rng 7/18/2023   Hepatitis B Surface AG      NEG  NEGATIVE    Hepatitis C AB NONREACTIVE…    HIV 1/2 Antigen/Antibody Screen with Reflex to Confirmation NONREACTIVE…        Immunization History   Administered Date(s) Administered    Flu vaccine (IIV4), preservative free *Check age/dose* 09/30/2019    Pneumococcal polysaccharide vaccine, 23-valent, age 2 years and older (PNEUMOVAX 23) 10/15/2019    Tdap vaccine, age 7 year and older (BOOSTRIX, ADACEL) 03/07/2011, 10/15/2016          Past Medical History:   Diagnosis Date    Anxiety     Exertional shortness of breath 06/07/2023    Foot swelling 06/07/2023    Kidney stones     Lupus (Multi)     dx 09/2013    Mastodynia 06/07/2023    Other chest pain 01/10/2014    Atypical chest pain    Other conditions influencing health status 04/18/2016    Skin Lesion    Other muscle spasm 02/19/2016    Spasm of cervical paraspinous muscle    Pain in unspecified ankle and joints of unspecified foot 01/27/2015    Ankle joint pain    Pain in unspecified limb 07/29/2016    Limb pain    Pain in unspecified upper arm 07/29/2016    Pain in axilla    Personal history of diseases of the skin and subcutaneous tissue 02/20/2014    History of  urticaria    Personal history of other diseases of the musculoskeletal system and connective tissue 2013    History of low back pain    Personal history of other diseases of the nervous system and sense organs 2016    History of tension headache    Personal history of other diseases of the respiratory system 2014    History of sinusitis    Personal history of other endocrine, nutritional and metabolic disease 2015    History of vitamin D deficiency    Personal history of other endocrine, nutritional and metabolic disease 2016    History of Hashimoto thyroiditis    Personal history of other endocrine, nutritional and metabolic disease 2016    History of hypokalemia    Personal history of other infectious and parasitic diseases 2016    History of herpes zoster    Personal history of other specified conditions 2014    History of urinary frequency    Personal history of other specified conditions 2021    History of abdominal pain    Personal history of other specified conditions 2021    History of nausea    Personal history of urinary (tract) infections 2014    History of urinary tract infection    Right upper quadrant pain 2013    Abdominal pain, RUQ (right upper quadrant)    Sleep disorder 2023    Strain of muscle, fascia and tendon at neck level, initial encounter 2016    Cervical strain    Swelling of both hands 2023    Tachycardia     Thyroid cancer (Multi)       Past Surgical History:   Procedure Laterality Date    BLADDER SUSPENSION  2021    bladder sling     SECTION, CLASSIC  06/27/2013    x3    CHOLECYSTECTOMY  2013    Cholecystectomy Laparoscopic    COLONOSCOPY  2021    GALLBLADDER SURGERY  2016    Gallbladder Surgery    HYSTERECTOMY  2022    OTHER SURGICAL HISTORY      GASTRO POLYPS REMOVED    OTHER SURGICAL HISTORY  2021    ABLATION    TOTAL THYROIDECTOMY  2016    Thyroid Surgery  Total Thyroidectomy    TUBAL LIGATION  04/10/2013    Tubal Ligation      Current Outpatient Medications   Medication Sig Dispense Refill    Ajovy Autoinjector 225 mg/1.5 mL auto-injector Inject 1 Pen (225 mg) under the skin every 30 (thirty) days.      cyanocobalamin (Vitamin B-12) 100 mcg tablet Take 1 tablet (100 mcg) by mouth once daily.      docusate sodium (Colace) 100 mg capsule Take 1 capsule (100 mg) by mouth 2 times a day as needed for constipation (constipation). 60 capsule 5    escitalopram (Lexapro) 20 mg tablet Take 1 tablet (20 mg) by mouth once daily. 90 tablet 3    famotidine (Pepcid) 20 mg tablet Take 1 tablet (20 mg) by mouth 2 times a day. 180 tablet 3    levothyroxine (Synthroid, Levoxyl) 137 mcg tablet Take 1 tablet (137 mcg) by mouth once daily. 90 tablet 3    melatonin 10 mg capsule Take 1 capsule (10 mg) by mouth once daily at bedtime.      meloxicam (Mobic) 15 mg tablet Take 1 tablet (15 mg) by mouth once daily. 6 tabs once a week 30 tablet 11    metoprolol succinate XL (Toprol-XL) 25 mg 24 hr tablet Take 1 tablet (25 mg) by mouth once daily. 90 tablet 3    naloxone (Narcan) 4 mg/0.1 mL nasal spray May repeat in 2-3 minutes if needed, alternating nostrils. 2 each 0    oxyCODONE-acetaminophen (Percocet) 5-325 mg tablet Take 1 tablet by mouth every 6 hours if needed for moderate pain (4 - 6) for up to 7 days. Do not exceed 8 tablets per day. 28 tablet 0    promethazine (Phenergan) 25 mg tablet Take 1 tablet (25 mg) by mouth every 8 hours if needed for nausea. 90 tablet 3    rimegepant (NURTEC) 75 mg tablet,disintegrating Take 1 tablet (75 mg) by mouth once daily as needed (migraine). 10 tablet 11    tiZANidine (Zanaflex) 4 mg tablet Take 0.5 tablets (2 mg) by mouth every 8 hours if needed for muscle spasms. 90 tablet 3    adalimumab (Humira,CF, Pen) 40 mg/0.4 mL pen injector kit pen-injector Every other week 2 each 11     No current facility-administered medications for this visit.     "  Allergies   Allergen Reactions    Hydroxychloroquine Other     \"suicidal ideation\"    Amoxicillin Hives and Rash    Methotrexate Nausea/vomiting    Adhesive Rash    Adhesive Tape-Silicones Rash    Caffeine Other and Palpitations    Hydromorphone Nausea/vomiting    Omeprazole Rash    Penicillins Rash    Sulfa (Sulfonamide Antibiotics) Swelling and Rash        Vitals:    05/29/24 1300   BP: (!) 151/94   BP Location: Left arm   Patient Position: Sitting   Pulse: 93   SpO2: 99%   Weight: 86.5 kg (190 lb 11.2 oz)   Height: 1.6 m (5' 3\")     Pain Assessment Pain Score: 5 - Moderate pain, Pain Location: Knee (left knee and headache)     Rapid 3  Function Score (FN): 1.7  Pain Score (PN) (0-10): 5  Patient Global (PTGL) (0-10): 3  Rapid3 Score: 9.7  RAPID3 Weighted Score: 3.23       Workup  Component      Latest Ref Rn 5/21/2024   LEUKOCYTES (10*3/UL) IN BLOOD BY AUTOMATED COUNT, [place]      4.4 - 11.3 x10*3/uL 5.9    nRBC      0.0 - 0.0 /100 WBCs 0.0    ERYTHROCYTES (10*6/UL) IN BLOOD BY AUTOMATED COUNT, [place]      4.00 - 5.20 x10*6/uL 3.97 (L)    HEMOGLOBIN      12.0 - 16.0 g/dL 13.5    HEMATOCRIT      36.0 - 46.0 % 39.7    MCV      80 - 100 fL 100    MCH      26.0 - 34.0 pg 34.0    MCHC      32.0 - 36.0 g/dL 34.0    RED CELL DISTRIBUTION WIDTH      11.5 - 14.5 % 14.5    PLATELETS (10*3/UL) IN BLOOD AUTOMATED COUNT, [place]      150 - 450 x10*3/uL 408    NEUTROPHILS/100 LEUKOCYTES IN BLOOD BY AUTOMATED COUNT, [place]      40.0 - 80.0 % 65.9    Immature Granulocytes %, Automated      0.0 - 0.9 % 0.2    Lymphocytes %      13.0 - 44.0 % 27.7    Monocytes %      2.0 - 10.0 % 4.7    Eosinophils %      0.0 - 6.0 % 1.0    Basophils %      0.0 - 2.0 % 0.5    NEUTROPHILS (10*3/UL) IN BLOOD BY AUTOMATED COUNT, [place]      1.20 - 7.70 x10*3/uL 3.91    Immature Granulocytes Absolute, Automated      0.00 - 0.70 x10*3/uL 0.01    Lymphocytes Absolute      1.20 - 4.80 x10*3/uL 1.64    Monocytes Absolute      0.10 - 1.00 " x10*3/uL 0.28    Eosinophils Absolute      0.00 - 0.70 x10*3/uL 0.06    Basophils Absolute      0.00 - 0.10 x10*3/uL 0.03    GLUCOSE      65 - 99 mg/dL 108 (H)    SODIUM      133 - 145 mmol/L 137    POTASSIUM      3.4 - 5.1 mmol/L 3.5    CHLORIDE      97 - 107 mmol/L 101    Bicarbonate      24 - 31 mmol/L 24    Blood Urea Nitrogen      8 - 25 mg/dL 15    Creatinine      0.40 - 1.60 mg/dL 0.70    EGFR      >60 mL/min/1.73m*2 >90    Calcium      8.5 - 10.4 mg/dL 9.4    Albumin      3.5 - 5.0 g/dL 4.7    Alkaline Phosphatase      35 - 125 U/L 55    Total Protein      5.9 - 7.9 g/dL 7.4    AST      5 - 40 U/L 22    Bilirubin Total      0.1 - 1.2 mg/dL 0.3    ALT      5 - 40 U/L 35    Anion Gap      <=19 mmol/L 12    C-Reactive Protein      0.00 - 2.00 mg/dL <0.30    Creatine Kinase      24 - 195 U/L 44    Sed Rate      0 - 20 mm/h 25 (H)    Vitamin D, 25-Hydroxy, Total      31 - 100 ng/mL 23 (L)    Scan Result 39 (moderate)   C3 Complement      87 - 200 mg/dL 164    C4 Complement      10 - 50 mg/dL 36    Anti-DNA (DS)      <5.0 IU/mL <1.0    Vitamin B12      211 - 946 pg/mL 258    Methylmalonic Acid, S      0.00 - 0.40 umol/L <0.10    Thyroid Stimulating Hormone      0.27 - 4.20 mIU/L 4.11    Triiodothyronine      60 - 200 ng/dL 94    Triiodothyronine, Free      2.3 - 4.2 pg/mL 2.2 (L)    Thyroxine, Free      0.90 - 1.70 ng/dL 1.20          Assessment/Plan  1. Positive MARK (antinuclear antibody)    2. Polyarthritis with negative rheumatoid factor (Multi)    3. Sjogren syndrome with myopathy (Multi)    4. Other chronic pain    5. Other fatigue    6. Vitamin D deficiency    7. Encounter for long-term (current) use of medications         Orders Placed This Encounter   Procedures    Albumin , Urine Random    Anti-DNA Antibody, Double-Stranded    MARK + TARIQ Panel    C1Q Complement    C3 Complement    C4 Complement    C-Reactive Protein    Creatine Kinase    Creatinine, Urine Random    Urinalysis with Reflex Culture and  Microscopic    [facility]; [facility]; [id number] - Miscellaneous Test    Vitamin D 25-Hydroxy,Total (for eval of Vitamin D levels)    Vitamin D 1,25 Dihydroxy (for eval of hypercalcemia)    Vitamin B12        Since last appt, adherent and tolerating meloxicam.  Had to discontinue methotrexate due to N/V.  Diagnosed with POTS by PCP - no tx  Denies any recent or current infection.  Not on any glucocorticoids.  BP high today, no H/A/CP, feels ok. Will be checking with her PCP. Took her BB today.  Tizanidine helps with neck pain/H/A.  Had hx of lupus but only currently joint aches  Oral sore does not appear SLE  Hx of biopsy showing SCL. She is currently not having the rash, just sun sens and has a red non tender nodules on LUE- stable  Hx of dermatofibroma removed from RLE  Tramadol not much nelp   ROS+ for intermediate sicca sx, sore throat, nasal sore and oral sore chronic- non tender, swollen glands/nodes, GERD, diarrhea and constipation/ hx IBS, sun sens, anemia- not eating well, no bleeding, joint pain, weakness hands, numbness/tingling.  Rapid 3 consistent with moderate severity.  Labs reviewed  D/w pt tx options and decided on   Trial of Humira  Continue meloxicam with plan to eventually wean off as able.  Hydrate well  Pain med  Advised of possible side effects including drug induced lupus sx, infection, and importance of monitoring.   All questions answered.  Patient to follow up with primary care provider regarding all other medical issues not addressed today and for medical chart updating.         Jolly Conteh MD      Patient Care Team:  HAN Link as PCP - [facility] PCP  HAN Link as PCP - [facility] PCP  Jolyl Conteh MD as Consulting Physician (Rheumatology)

## 2024-05-30 ENCOUNTER — SPECIALTY PHARMACY (OUTPATIENT)
Dept: PHARMACY | Facility: CLINIC | Age: 39
End: 2024-05-30

## 2024-06-03 DIAGNOSIS — M06.09 POLYARTHRITIS WITH NEGATIVE RHEUMATOID FACTOR (MULTI): ICD-10-CM

## 2024-06-03 DIAGNOSIS — G89.29 OTHER CHRONIC PAIN: ICD-10-CM

## 2024-06-03 NOTE — TELEPHONE ENCOUNTER
PT LEFT VM REQUESTING REFILL OF PERCOCET 5/235 TO [facility] IN [place]. PT STATES AT LAST VISIT THE TWO OF YOU DECIDED THIS WAS THE BEST MEDICATION FOR HER.

## 2024-06-05 RX ORDER — OXYCODONE AND ACETAMINOPHEN 5; 325 MG/1; MG/1
1 TABLET ORAL EVERY 6 HOURS PRN
Qty: 28 TABLET | Refills: 0 | Status: SHIPPED | OUTPATIENT
Start: 2024-06-05 | End: 2024-06-12

## 2024-06-10 ENCOUNTER — TELEPHONE (OUTPATIENT)
Dept: RHEUMATOLOGY | Facility: CLINIC | Age: 39
End: 2024-06-10
Payer: COMMERCIAL

## 2024-06-10 NOTE — TELEPHONE ENCOUNTER
PT LEFT MESSAGE WITH SERVICE STATING SHE WAS TO CLL ANYTIME SHE GOT SICK. LEFT VM FOR PT TO CALL BACK WITH MORE DETAILS ON HOW SHE IE SICK. ALSO INFORMED HER THAT DR WINN IS NOT IN UNTIL WED & IF CANNOT WAIT TO CALL PCP.

## 2024-06-12 ENCOUNTER — PATIENT MESSAGE (OUTPATIENT)
Dept: RHEUMATOLOGY | Facility: CLINIC | Age: 39
End: 2024-06-12
Payer: COMMERCIAL

## 2024-06-12 DIAGNOSIS — M06.09 POLYARTHRITIS WITH NEGATIVE RHEUMATOID FACTOR (MULTI): Primary | ICD-10-CM

## 2024-06-13 RX ORDER — OXYCODONE AND ACETAMINOPHEN 7.5; 325 MG/1; MG/1
1 TABLET ORAL EVERY 6 HOURS PRN
Qty: 28 TABLET | Refills: 0 | Status: SHIPPED | OUTPATIENT
Start: 2024-06-13 | End: 2024-06-20

## 2024-06-19 ENCOUNTER — TELEPHONE (OUTPATIENT)
Dept: RHEUMATOLOGY | Facility: CLINIC | Age: 39
End: 2024-06-19
Payer: COMMERCIAL

## 2024-06-19 ENCOUNTER — PATIENT MESSAGE (OUTPATIENT)
Dept: RHEUMATOLOGY | Facility: CLINIC | Age: 39
End: 2024-06-19
Payer: COMMERCIAL

## 2024-06-19 DIAGNOSIS — Z79.899 ENCOUNTER FOR LONG-TERM (CURRENT) USE OF MEDICATIONS: Primary | ICD-10-CM

## 2024-06-19 DIAGNOSIS — M06.09 POLYARTHRITIS WITH NEGATIVE RHEUMATOID FACTOR (MULTI): ICD-10-CM

## 2024-06-19 NOTE — TELEPHONE ENCOUNTER
----- Message from Anusha Manrique sent at 6/14/2024  2:34 PM EDT -----  Regarding: TB  Good afternoon,     We are trying to submit a PA for humira but the plan is asking for a negative TB test we are unable to find that.     Thank you,  Anusha BORJA

## 2024-06-21 RX ORDER — OXYCODONE AND ACETAMINOPHEN 7.5; 325 MG/1; MG/1
1 TABLET ORAL EVERY 6 HOURS PRN
Qty: 28 TABLET | Refills: 0 | Status: SHIPPED | OUTPATIENT
Start: 2024-06-21 | End: 2024-06-28

## 2024-06-26 ENCOUNTER — PATIENT MESSAGE (OUTPATIENT)
Dept: RHEUMATOLOGY | Facility: CLINIC | Age: 39
End: 2024-06-26
Payer: COMMERCIAL

## 2024-06-26 DIAGNOSIS — G89.29 OTHER CHRONIC PAIN: ICD-10-CM

## 2024-06-26 DIAGNOSIS — M06.09 POLYARTHRITIS WITH NEGATIVE RHEUMATOID FACTOR (MULTI): Primary | ICD-10-CM

## 2024-06-26 RX ORDER — OXYCODONE AND ACETAMINOPHEN 7.5; 325 MG/1; MG/1
1 TABLET ORAL EVERY 6 HOURS PRN
Qty: 28 TABLET | Refills: 0 | Status: SHIPPED | OUTPATIENT
Start: 2024-06-28 | End: 2024-07-05

## 2024-06-26 NOTE — TELEPHONE ENCOUNTER
From: Zuleyma Farrell  To: Jolly Conteh  Sent: 6/26/2024 2:06 AM EDT  Subject: Medicine    Hello, hope you had a good weekend. I am asking for a refill on my pain meds. I am not due for a refill until Friday but I know it can take up 72 hours. The refill is for oxycodone 7.5/325. I am hoping she can call the prescription in on Thursday so I will have it Friday and there will not be any lapse in medication. It’s easier to stay on top of my pain when I’m consistent with my meds. Also, can she just call in a months supply so I don’t have to do this every week? Thank you so much.     Zuleyma Farrell   [phone]

## 2024-07-02 ENCOUNTER — LAB (OUTPATIENT)
Dept: LAB | Facility: LAB | Age: 39
End: 2024-07-02
Payer: COMMERCIAL

## 2024-07-02 ENCOUNTER — APPOINTMENT (OUTPATIENT)
Dept: PRIMARY CARE | Facility: CLINIC | Age: 39
End: 2024-07-02
Payer: COMMERCIAL

## 2024-07-02 VITALS
DIASTOLIC BLOOD PRESSURE: 83 MMHG | SYSTOLIC BLOOD PRESSURE: 130 MMHG | HEIGHT: 63 IN | HEART RATE: 88 BPM | BODY MASS INDEX: 34.73 KG/M2 | OXYGEN SATURATION: 95 % | WEIGHT: 196 LBS

## 2024-07-02 DIAGNOSIS — Z79.899 ENCOUNTER FOR LONG-TERM (CURRENT) USE OF MEDICATIONS: ICD-10-CM

## 2024-07-02 DIAGNOSIS — G47.9 SLEEPING DIFFICULTIES: Primary | ICD-10-CM

## 2024-07-02 DIAGNOSIS — F41.9 ANXIETY: ICD-10-CM

## 2024-07-02 PROBLEM — Z90.49 HISTORY OF CHOLECYSTECTOMY: Status: RESOLVED | Noted: 2024-01-19 | Resolved: 2024-07-02

## 2024-07-02 PROBLEM — R73.9 HYPERGLYCEMIA: Status: RESOLVED | Noted: 2024-01-19 | Resolved: 2024-07-02

## 2024-07-02 PROBLEM — C73 PAPILLARY MICROCARCINOMA OF THYROID (MULTI): Status: RESOLVED | Noted: 2023-06-07 | Resolved: 2024-07-02

## 2024-07-02 PROBLEM — K85.90 PANCREATITIS (HHS-HCC): Status: RESOLVED | Noted: 2024-01-19 | Resolved: 2024-07-02

## 2024-07-02 PROBLEM — Z86.32 HISTORY OF GESTATIONAL DIABETES MELLITUS (GDM): Status: RESOLVED | Noted: 2024-01-19 | Resolved: 2024-07-02

## 2024-07-02 PROCEDURE — 36415 COLL VENOUS BLD VENIPUNCTURE: CPT

## 2024-07-02 PROCEDURE — 99213 OFFICE O/P EST LOW 20 MIN: CPT | Performed by: NURSE PRACTITIONER

## 2024-07-02 PROCEDURE — 86481 TB AG RESPONSE T-CELL SUSP: CPT

## 2024-07-02 RX ORDER — TRAZODONE HYDROCHLORIDE 50 MG/1
50 TABLET ORAL NIGHTLY PRN
Qty: 30 TABLET | Refills: 3 | Status: SHIPPED | OUTPATIENT
Start: 2024-07-02 | End: 2025-07-02

## 2024-07-02 ASSESSMENT — PATIENT HEALTH QUESTIONNAIRE - PHQ9
1. LITTLE INTEREST OR PLEASURE IN DOING THINGS: NOT AT ALL
SUM OF ALL RESPONSES TO PHQ9 QUESTIONS 1 AND 2: 0
2. FEELING DOWN, DEPRESSED OR HOPELESS: NOT AT ALL

## 2024-07-02 NOTE — ASSESSMENT & PLAN NOTE
She is not taking the promethazine, Benadryl, and tizanidine.  We will try trazodone 50 mg at bedtime as needed insomnia to see if this helps

## 2024-07-02 NOTE — PROGRESS NOTES
"Subjective   Patient ID: Zuleyma Farrell is a 39 y.o. female who presents for Med Refill.    Presents to follow-up for anxiety.  Feels that her anxiety has been stable with the escitalopram.    She is following rheumatology and she was stopped on her methotrexate.  She will be starting Humira but does have to have a TB test prior to doing that of which she will complete later today.  She did have the labs completed with her new rheumatologist.  She has stopped taking the promethazine as she is not taking the methotrexate which made her quite nauseous.  She also stopped taking the Benadryl and the tizanidine.  She states that since stopping all this approximately a few weeks ago she has had trouble sleeping and she finds that she is more nocturnal.  She would like to know if there is something nonaddictive or dependent that she can take that would help her sleep if she has not been sleeping in a few days    Med Refill         Review of Systems   All other systems reviewed and are negative.      Objective   /83   Pulse 88   Ht 1.6 m (5' 3\")   Wt 88.9 kg (196 lb)   SpO2 95%   BMI 34.72 kg/m²     Physical Exam  Vitals and nursing note reviewed.   Constitutional:       General: She is not in acute distress.     Appearance: Normal appearance.   HENT:      Head: Normocephalic and atraumatic.      Right Ear: External ear normal.      Left Ear: External ear normal.      Nose: Nose normal.      Mouth/Throat:      Mouth: Mucous membranes are moist.      Pharynx: Oropharynx is clear.   Eyes:      Extraocular Movements: Extraocular movements intact.      Conjunctiva/sclera: Conjunctivae normal.      Pupils: Pupils are equal, round, and reactive to light.   Neck:      Vascular: No carotid bruit.   Cardiovascular:      Rate and Rhythm: Normal rate and regular rhythm.      Pulses: Normal pulses.      Heart sounds: Normal heart sounds.   Pulmonary:      Effort: Pulmonary effort is normal.      Breath sounds: Normal breath " sounds.   Musculoskeletal:      Cervical back: Normal range of motion and neck supple.   Lymphadenopathy:      Cervical: No cervical adenopathy.   Skin:     General: Skin is warm and dry.      Capillary Refill: Capillary refill takes less than 2 seconds.   Neurological:      Mental Status: She is alert and oriented to person, place, and time.   Psychiatric:         Mood and Affect: Mood normal.         Behavior: Behavior normal.         Thought Content: Thought content normal.         Judgment: Judgment normal.         Assessment/Plan   Problem List Items Addressed This Visit             ICD-10-CM    Anxiety F41.9     Stable  Continue escitalopram-refilled  Follow-up in 6 months         Sleeping difficulties - Primary G47.9     She is not taking the promethazine, Benadryl, and tizanidine.  We will try trazodone 50 mg at bedtime as needed insomnia to see if this helps         Relevant Medications    traZODone (Desyrel) 50 mg tablet

## 2024-07-02 NOTE — PATIENT INSTRUCTIONS
I refilled your escitalopram    I sent in trazodone to help you sleep.  If it doesn't help you can increase to 2 tablets

## 2024-07-03 ENCOUNTER — PATIENT MESSAGE (OUTPATIENT)
Dept: RHEUMATOLOGY | Facility: CLINIC | Age: 39
End: 2024-07-03
Payer: COMMERCIAL

## 2024-07-03 DIAGNOSIS — M06.09 POLYARTHRITIS WITH NEGATIVE RHEUMATOID FACTOR (MULTI): ICD-10-CM

## 2024-07-03 DIAGNOSIS — G89.29 OTHER CHRONIC PAIN: ICD-10-CM

## 2024-07-03 RX ORDER — OXYCODONE AND ACETAMINOPHEN 7.5; 325 MG/1; MG/1
1 TABLET ORAL EVERY 6 HOURS PRN
Qty: 28 TABLET | Refills: 0 | Status: SHIPPED | OUTPATIENT
Start: 2024-07-05 | End: 2024-07-12

## 2024-07-03 NOTE — TELEPHONE ENCOUNTER
From: Zuleyma Farrell  To: Jolly Conteh  Sent: 7/3/2024 12:35 PM EDT  Subject: Refill    Hello, can Dr yoo please send in my refill for the Percocet 7.5/325? I am not due for my refill until Friday but wondering if I can pick it up today. With tomorrow being the Fourth of July we are planning to get together with family for the weekend. If not, I will pick it up when we get back. Please send the refill to [facility]. I also got the tb test drawn yesterday so I can start the humira.   Thank you, have a good fourth!   Zuleyma Farrell   [phone]

## 2024-07-05 LAB
NIL(NEG) CONTROL SPOT COUNT: NORMAL
PANEL A SPOT COUNT: 0
PANEL B SPOT COUNT: 0
POS CONTROL SPOT COUNT: NORMAL
T-SPOT. TB INTERPRETATION: NEGATIVE

## 2024-07-09 ENCOUNTER — PATIENT MESSAGE (OUTPATIENT)
Dept: RHEUMATOLOGY | Facility: CLINIC | Age: 39
End: 2024-07-09
Payer: COMMERCIAL

## 2024-07-09 DIAGNOSIS — M06.09 POLYARTHRITIS WITH NEGATIVE RHEUMATOID FACTOR (MULTI): ICD-10-CM

## 2024-07-09 DIAGNOSIS — G89.29 OTHER CHRONIC PAIN: ICD-10-CM

## 2024-07-10 RX ORDER — OXYCODONE AND ACETAMINOPHEN 7.5; 325 MG/1; MG/1
1 TABLET ORAL EVERY 6 HOURS PRN
Qty: 28 TABLET | Refills: 0 | Status: SHIPPED | OUTPATIENT
Start: 2024-07-12 | End: 2024-07-19

## 2024-07-15 ENCOUNTER — SPECIALTY PHARMACY (OUTPATIENT)
Dept: PHARMACY | Facility: CLINIC | Age: 39
End: 2024-07-15

## 2024-07-15 PROCEDURE — RXMED WILLOW AMBULATORY MEDICATION CHARGE

## 2024-07-16 ENCOUNTER — PHARMACY VISIT (OUTPATIENT)
Dept: PHARMACY | Facility: CLINIC | Age: 39
End: 2024-07-16
Payer: MEDICAID

## 2024-07-17 ENCOUNTER — PATIENT MESSAGE (OUTPATIENT)
Dept: RHEUMATOLOGY | Facility: CLINIC | Age: 39
End: 2024-07-17
Payer: COMMERCIAL

## 2024-07-17 DIAGNOSIS — G89.29 OTHER CHRONIC PAIN: ICD-10-CM

## 2024-07-17 DIAGNOSIS — M06.09 POLYARTHRITIS WITH NEGATIVE RHEUMATOID FACTOR (MULTI): ICD-10-CM

## 2024-07-17 RX ORDER — OXYCODONE AND ACETAMINOPHEN 7.5; 325 MG/1; MG/1
1 TABLET ORAL EVERY 6 HOURS PRN
Qty: 112 TABLET | Refills: 0 | Status: SHIPPED | OUTPATIENT
Start: 2024-07-19

## 2024-07-24 ENCOUNTER — PATIENT MESSAGE (OUTPATIENT)
Dept: RHEUMATOLOGY | Facility: CLINIC | Age: 39
End: 2024-07-24
Payer: COMMERCIAL

## 2024-07-24 DIAGNOSIS — R11.0 NAUSEA: Primary | ICD-10-CM

## 2024-07-24 RX ORDER — PROMETHAZINE HYDROCHLORIDE 25 MG/1
25 TABLET ORAL 3 TIMES DAILY
Qty: 90 TABLET | Refills: 1 | Status: SHIPPED | OUTPATIENT
Start: 2024-07-24 | End: 2024-08-23

## 2024-07-26 ENCOUNTER — SPECIALTY PHARMACY (OUTPATIENT)
Dept: RHEUMATOLOGY | Facility: CLINIC | Age: 39
End: 2024-07-26
Payer: COMMERCIAL

## 2024-08-01 ENCOUNTER — PATIENT MESSAGE (OUTPATIENT)
Dept: RHEUMATOLOGY | Facility: CLINIC | Age: 39
End: 2024-08-01
Payer: COMMERCIAL

## 2024-08-08 ENCOUNTER — SPECIALTY PHARMACY (OUTPATIENT)
Dept: PHARMACY | Facility: CLINIC | Age: 39
End: 2024-08-08

## 2024-08-08 PROCEDURE — RXMED WILLOW AMBULATORY MEDICATION CHARGE

## 2024-08-12 DIAGNOSIS — G47.9 SLEEPING DIFFICULTIES: ICD-10-CM

## 2024-08-12 RX ORDER — TRAZODONE HYDROCHLORIDE 50 MG/1
150 TABLET ORAL NIGHTLY PRN
Qty: 90 TABLET | Refills: 3 | Status: SHIPPED | OUTPATIENT
Start: 2024-08-12

## 2024-08-14 ENCOUNTER — PATIENT MESSAGE (OUTPATIENT)
Dept: RHEUMATOLOGY | Facility: CLINIC | Age: 39
End: 2024-08-14
Payer: COMMERCIAL

## 2024-08-14 DIAGNOSIS — G89.29 OTHER CHRONIC PAIN: ICD-10-CM

## 2024-08-14 DIAGNOSIS — M06.09 POLYARTHRITIS WITH NEGATIVE RHEUMATOID FACTOR (MULTI): ICD-10-CM

## 2024-08-15 ENCOUNTER — PHARMACY VISIT (OUTPATIENT)
Dept: PHARMACY | Facility: CLINIC | Age: 39
End: 2024-08-15
Payer: MEDICAID

## 2024-08-15 RX ORDER — OXYCODONE AND ACETAMINOPHEN 7.5; 325 MG/1; MG/1
1 TABLET ORAL EVERY 6 HOURS PRN
Qty: 112 TABLET | Refills: 0 | Status: SHIPPED | OUTPATIENT
Start: 2024-08-16

## 2024-08-25 DIAGNOSIS — R11.0 NAUSEA: ICD-10-CM

## 2024-08-26 RX ORDER — PROMETHAZINE HYDROCHLORIDE 25 MG/1
25 TABLET ORAL 3 TIMES DAILY
Qty: 90 TABLET | Refills: 1 | Status: SHIPPED | OUTPATIENT
Start: 2024-08-26

## 2024-08-27 ENCOUNTER — PATIENT MESSAGE (OUTPATIENT)
Dept: PRIMARY CARE | Facility: CLINIC | Age: 39
End: 2024-08-27

## 2024-08-27 ENCOUNTER — OFFICE VISIT (OUTPATIENT)
Dept: PRIMARY CARE | Facility: CLINIC | Age: 39
End: 2024-08-27
Payer: COMMERCIAL

## 2024-08-27 VITALS
OXYGEN SATURATION: 96 % | DIASTOLIC BLOOD PRESSURE: 83 MMHG | BODY MASS INDEX: 36.29 KG/M2 | HEIGHT: 63 IN | SYSTOLIC BLOOD PRESSURE: 136 MMHG | WEIGHT: 204.8 LBS | HEART RATE: 98 BPM

## 2024-08-27 DIAGNOSIS — R73.01 ELEVATED FASTING GLUCOSE: ICD-10-CM

## 2024-08-27 DIAGNOSIS — E89.0 HYPOTHYROIDISM, POSTSURGICAL: ICD-10-CM

## 2024-08-27 DIAGNOSIS — R73.01 ELEVATED FASTING GLUCOSE: Primary | ICD-10-CM

## 2024-08-27 DIAGNOSIS — Z12.39 BREAST SCREENING: ICD-10-CM

## 2024-08-27 DIAGNOSIS — N64.4 BREAST TENDERNESS IN FEMALE: ICD-10-CM

## 2024-08-27 DIAGNOSIS — Z00.00 WELLNESS EXAMINATION: Primary | ICD-10-CM

## 2024-08-27 DIAGNOSIS — R22.1 LOCALIZED SWELLING, MASS OR LUMP OF NECK: ICD-10-CM

## 2024-08-27 DIAGNOSIS — I10 PRIMARY HYPERTENSION: ICD-10-CM

## 2024-08-27 PROCEDURE — 3008F BODY MASS INDEX DOCD: CPT

## 2024-08-27 PROCEDURE — 3075F SYST BP GE 130 - 139MM HG: CPT

## 2024-08-27 PROCEDURE — 3079F DIAST BP 80-89 MM HG: CPT

## 2024-08-27 PROCEDURE — 99213 OFFICE O/P EST LOW 20 MIN: CPT

## 2024-08-27 PROCEDURE — 4004F PT TOBACCO SCREEN RCVD TLK: CPT

## 2024-08-27 PROCEDURE — 99396 PREV VISIT EST AGE 40-64: CPT

## 2024-08-27 RX ORDER — CHLORTHALIDONE 25 MG/1
25 TABLET ORAL DAILY
Qty: 30 TABLET | Refills: 5 | Status: SHIPPED | OUTPATIENT
Start: 2024-08-27 | End: 2025-02-23

## 2024-08-27 RX ORDER — TIZANIDINE 2 MG/1
1 TABLET ORAL
COMMUNITY
Start: 2024-08-25

## 2024-08-27 ASSESSMENT — ENCOUNTER SYMPTOMS
COUGH: 0
NUMBNESS: 0
LEG PAIN: 0
WEAKNESS: 0
SYNCOPE: 0
VISUAL CHANGE: 0
ORTHOPNEA: 1
HEADACHES: 0
WEIGHT LOSS: 0
FATIGUE: 1
NECK PAIN: 1
PHOTOPHOBIA: 0

## 2024-08-27 NOTE — PROGRESS NOTES
Subjective   Patient ID: Zuleyma Farrell is a 39 y.o. female who presents for Leg Swelling (Bilateral swelling of legs. This has been going on for 6 days. Patient is winded and having night sweat with a swollen left neck lymphnode.).    Pt is here to establish care, wellness reviewed, swelling in legs, lump on side of neck Reports overall health is crappy    Do you take any herbs or supplements that were not prescribed by a doctor? no  Are you taking calcium supplements? no  Are you taking aspirin daily? no  Colonoscopy: n/a  Fasting blood work: labs ordered  Last eye exam: been awhile  Last dental Exam: couple months ago  Exercise:none  Mood:pleasant  Sleep: on and off, up and down all night  Diet:  not good  Occupation:  stay at home  Do you have pain that bothers you in your daily life? yes    1. Patient Counseling:  --Nutrition: Stressed importance of moderation in sodium/caffeine intake, saturated fat and cholesterol, caloric balance, sufficient intake of fresh fruits, vegetables, fiber, calcium, iron, and 1 mg of folate supplement per day (for females capable of pregnancy).  --Discussed the issue of estrogen replacement, calcium supplement, and the daily use of baby aspirin as appropriate per pt.  --Exercise: Stressed the importance of regular exercise.   --Substance Abuse: Discussed cessation/primary prevention of tobacco, alcohol, or other drug use; driving or other dangerous activities under the influence; availability of treatment for abuse.    --Sexuality: Discussed sexually transmitted diseases, partner selection, use of condoms, avoidance of unintended pregnancy  and contraceptive alternatives.   --Injury prevention: Discussed safety belts, safety helmets, smoke detector, smoking near bedding or upholstery.   --Dental health: Discussed importance of regular tooth brushing, flossing, and dental visits.  --Immunizations reviewed.  --After hours service discussed with patient  2 Discussed the patient's BMI.  " The BMI is above average. The patient received Provided instructions on dietary changes  Provided instructions on exercise because they have an above normal BMI.  3 Follow up as needed for acute illness        Edema    Presents with new edema. The current episode started less than one week ago. The onset of the episode was undetermined. The problem presents itself constantly. The problem has been stable. The edema is present on the both (feels right greater than left) side(s).   Associated agents include no associated agents.   Associated symptoms include fatigue and orthopnea.   Pertinent negative symptoms include no chest pain, no cough and no syncope.     Pertinent negative history includes no CAD.   Past medical history comments: Did just start humera.   Neck Pain   This is a new problem. The current episode started 1 to 4 weeks ago. The problem occurs constantly. The problem has been gradually worsening. The pain is present in the left side. The patient is experiencing no pain. Nothing aggravates the symptoms. Pertinent negatives include no chest pain, headaches, leg pain, numbness, pain with swallowing, photophobia, syncope, visual change, weakness or weight loss.        Review of Systems   Constitutional:  Positive for fatigue. Negative for weight loss.   Eyes:  Negative for photophobia.   Respiratory:  Negative for cough.    Cardiovascular:  Negative for chest pain and syncope.   Musculoskeletal:  Positive for neck pain.   Neurological:  Negative for weakness, numbness and headaches.       Objective   /83   Pulse 98   Ht 1.6 m (5' 3\")   Wt 92.9 kg (204 lb 12.8 oz)   SpO2 96%   BMI 36.28 kg/m²     Physical Exam  Vitals reviewed.   Constitutional:       General: She is not in acute distress.     Appearance: She is obese. She is not ill-appearing.   Neck:     Cardiovascular:      Heart sounds: Normal heart sounds.   Pulmonary:      Breath sounds: Normal breath sounds.   Musculoskeletal:      Right " lower leg: 3+ Pitting Edema present.      Left lower le+ Pitting Edema present.   Neurological:      Mental Status: She is alert.         Assessment/Plan   Zuleyma was seen today for leg swelling.  Diagnoses and all orders for this visit:  Wellness examination  -     TSH with reflex to Free T4 if abnormal; Future  -     Lipid Panel; Future  Breast screening  -     BI mammo bilateral screening tomosynthesis; Future  Elevated fasting glucose  -     Hemoglobin A1C; Future  Hypothyroidism, postsurgical  Primary hypertension  -     chlorthalidone (Hygroton) 25 mg tablet; Take 1 tablet (25 mg) by mouth once daily.  -     Comprehensive metabolic panel; Future  Localized swelling, mass or lump of neck  -     US head neck soft tissue; Future

## 2024-08-28 RX ORDER — BLOOD-GLUCOSE SENSOR
EACH MISCELLANEOUS
Qty: 4 EACH | Refills: 2 | Status: SHIPPED | OUTPATIENT
Start: 2024-08-28

## 2024-09-11 ENCOUNTER — PATIENT MESSAGE (OUTPATIENT)
Dept: RHEUMATOLOGY | Facility: CLINIC | Age: 39
End: 2024-09-11
Payer: COMMERCIAL

## 2024-09-11 DIAGNOSIS — M06.09 POLYARTHRITIS WITH NEGATIVE RHEUMATOID FACTOR (MULTI): ICD-10-CM

## 2024-09-11 DIAGNOSIS — G89.29 OTHER CHRONIC PAIN: ICD-10-CM

## 2024-09-11 RX ORDER — OXYCODONE AND ACETAMINOPHEN 7.5; 325 MG/1; MG/1
1 TABLET ORAL EVERY 6 HOURS PRN
Qty: 112 TABLET | Refills: 0 | Status: SHIPPED | OUTPATIENT
Start: 2024-09-11

## 2024-09-12 ENCOUNTER — APPOINTMENT (OUTPATIENT)
Dept: RADIOLOGY | Facility: HOSPITAL | Age: 39
End: 2024-09-12
Payer: COMMERCIAL

## 2024-09-13 ENCOUNTER — PATIENT MESSAGE (OUTPATIENT)
Dept: RHEUMATOLOGY | Facility: CLINIC | Age: 39
End: 2024-09-13
Payer: COMMERCIAL

## 2024-09-13 DIAGNOSIS — R11.0 NAUSEA: ICD-10-CM

## 2024-09-13 PROBLEM — G90.1 DYSAUTONOMIA (MULTI): Status: ACTIVE | Noted: 2024-01-02

## 2024-09-13 RX ORDER — MELOXICAM 15 MG/1
1 TABLET ORAL
COMMUNITY
Start: 2024-09-11

## 2024-09-13 RX ORDER — PROMETHAZINE HYDROCHLORIDE 25 MG/1
25 TABLET ORAL 3 TIMES DAILY
Qty: 90 TABLET | Refills: 1 | Status: SHIPPED | OUTPATIENT
Start: 2024-09-13

## 2024-09-20 ENCOUNTER — APPOINTMENT (OUTPATIENT)
Dept: RADIOLOGY | Facility: HOSPITAL | Age: 39
End: 2024-09-20
Payer: COMMERCIAL

## 2024-09-24 ENCOUNTER — LAB (OUTPATIENT)
Dept: LAB | Facility: LAB | Age: 39
End: 2024-09-24
Payer: COMMERCIAL

## 2024-09-24 ENCOUNTER — APPOINTMENT (OUTPATIENT)
Dept: RADIOLOGY | Facility: HOSPITAL | Age: 39
End: 2024-09-24
Payer: COMMERCIAL

## 2024-09-24 DIAGNOSIS — I10 PRIMARY HYPERTENSION: ICD-10-CM

## 2024-09-24 DIAGNOSIS — E55.9 VITAMIN D DEFICIENCY: ICD-10-CM

## 2024-09-24 DIAGNOSIS — R73.01 ELEVATED FASTING GLUCOSE: ICD-10-CM

## 2024-09-24 DIAGNOSIS — R76.8 POSITIVE ANA (ANTINUCLEAR ANTIBODY): ICD-10-CM

## 2024-09-24 DIAGNOSIS — Z00.00 WELLNESS EXAMINATION: ICD-10-CM

## 2024-09-24 DIAGNOSIS — Z79.899 ENCOUNTER FOR LONG-TERM (CURRENT) USE OF MEDICATIONS: ICD-10-CM

## 2024-09-24 DIAGNOSIS — D75.89 MACROCYTOSIS WITHOUT ANEMIA: ICD-10-CM

## 2024-09-24 DIAGNOSIS — M06.09 POLYARTHRITIS WITH NEGATIVE RHEUMATOID FACTOR (MULTI): ICD-10-CM

## 2024-09-24 LAB
25(OH)D3 SERPL-MCNC: 34 NG/ML (ref 30–100)
ALBUMIN SERPL BCP-MCNC: 4.1 G/DL (ref 3.4–5)
ALP SERPL-CCNC: 37 U/L (ref 33–110)
ALT SERPL W P-5'-P-CCNC: 13 U/L (ref 7–45)
ANION GAP SERPL CALC-SCNC: <7 MMOL/L (ref 10–20)
APPEARANCE UR: CLEAR
AST SERPL W P-5'-P-CCNC: 11 U/L (ref 9–39)
BILIRUB SERPL-MCNC: 0.2 MG/DL (ref 0–1.2)
BILIRUB UR STRIP.AUTO-MCNC: NEGATIVE MG/DL
BUN SERPL-MCNC: 11 MG/DL (ref 6–23)
C3 SERPL-MCNC: 140 MG/DL (ref 87–200)
C4 SERPL-MCNC: 29 MG/DL (ref 10–50)
CALCIUM SERPL-MCNC: 9.1 MG/DL (ref 8.6–10.3)
CENTROMERE B AB SER-ACNC: <0.2 AI
CHLORIDE SERPL-SCNC: 104 MMOL/L (ref 98–107)
CHOLEST SERPL-MCNC: 235 MG/DL (ref 0–199)
CHOLESTEROL/HDL RATIO: 5.4
CHROMATIN AB SERPL-ACNC: <0.2 AI
CK SERPL-CCNC: 40 U/L (ref 0–215)
CO2 SERPL-SCNC: 32 MMOL/L (ref 21–32)
COLOR UR: NORMAL
CREAT SERPL-MCNC: 0.74 MG/DL (ref 0.5–1.05)
CREAT UR-MCNC: 145.2 MG/DL (ref 20–320)
CRP SERPL-MCNC: 0.21 MG/DL
DSDNA AB SER-ACNC: 1 IU/ML
EGFRCR SERPLBLD CKD-EPI 2021: >90 ML/MIN/1.73M*2
ENA JO1 AB SER QL IA: <0.2 AI
ENA RNP AB SER IA-ACNC: <0.2 AI
ENA SCL70 AB SER QL IA: <0.2 AI
ENA SM AB SER IA-ACNC: <0.2 AI
ENA SM+RNP AB SER QL IA: <0.2 AI
ENA SS-A AB SER IA-ACNC: >8 AI
ENA SS-B AB SER IA-ACNC: <0.2 AI
EST. AVERAGE GLUCOSE BLD GHB EST-MCNC: 103 MG/DL
GLUCOSE SERPL-MCNC: 98 MG/DL (ref 74–99)
GLUCOSE UR STRIP.AUTO-MCNC: NORMAL MG/DL
HBA1C MFR BLD: 5.2 %
HDLC SERPL-MCNC: 43.5 MG/DL
HOLD SPECIMEN: NORMAL
KETONES UR STRIP.AUTO-MCNC: NEGATIVE MG/DL
LDLC SERPL CALC-MCNC: 141 MG/DL
LEUKOCYTE ESTERASE UR QL STRIP.AUTO: NEGATIVE
MICROALBUMIN UR-MCNC: 10 MG/L
MICROALBUMIN/CREAT UR: 6.9 UG/MG CREAT
NITRITE UR QL STRIP.AUTO: NEGATIVE
NON HDL CHOLESTEROL: 192 MG/DL (ref 0–149)
PH UR STRIP.AUTO: 6.5 [PH]
POTASSIUM SERPL-SCNC: 3.6 MMOL/L (ref 3.5–5.3)
PROT SERPL-MCNC: 6.8 G/DL (ref 6.4–8.2)
PROT UR STRIP.AUTO-MCNC: NEGATIVE MG/DL
RBC # UR STRIP.AUTO: NEGATIVE /UL
RIBOSOMAL P AB SER-ACNC: <0.2 AI
SODIUM SERPL-SCNC: 138 MMOL/L (ref 136–145)
SP GR UR STRIP.AUTO: 1.02
T4 FREE SERPL-MCNC: 0.94 NG/DL (ref 0.61–1.12)
TRIGL SERPL-MCNC: 252 MG/DL (ref 0–149)
TSH SERPL-ACNC: 15.33 MIU/L (ref 0.44–3.98)
UROBILINOGEN UR STRIP.AUTO-MCNC: NORMAL MG/DL
VIT B12 SERPL-MCNC: 234 PG/ML (ref 211–911)
VLDL: 50 MG/DL (ref 0–40)

## 2024-09-24 PROCEDURE — 86160 COMPLEMENT ANTIGEN: CPT

## 2024-09-24 PROCEDURE — 82570 ASSAY OF URINE CREATININE: CPT

## 2024-09-24 PROCEDURE — 83036 HEMOGLOBIN GLYCOSYLATED A1C: CPT

## 2024-09-24 PROCEDURE — 82652 VIT D 1 25-DIHYDROXY: CPT

## 2024-09-24 PROCEDURE — 82043 UR ALBUMIN QUANTITATIVE: CPT

## 2024-09-24 PROCEDURE — 82306 VITAMIN D 25 HYDROXY: CPT

## 2024-09-24 PROCEDURE — 86235 NUCLEAR ANTIGEN ANTIBODY: CPT

## 2024-09-24 PROCEDURE — 82607 VITAMIN B-12: CPT

## 2024-09-24 PROCEDURE — 36415 COLL VENOUS BLD VENIPUNCTURE: CPT

## 2024-09-24 PROCEDURE — 86225 DNA ANTIBODY NATIVE: CPT

## 2024-09-25 ENCOUNTER — SPECIALTY PHARMACY (OUTPATIENT)
Dept: PHARMACY | Facility: CLINIC | Age: 39
End: 2024-09-25

## 2024-09-25 ENCOUNTER — PATIENT MESSAGE (OUTPATIENT)
Dept: RHEUMATOLOGY | Facility: CLINIC | Age: 39
End: 2024-09-25
Payer: COMMERCIAL

## 2024-09-25 DIAGNOSIS — M06.09 POLYARTHRITIS WITH NEGATIVE RHEUMATOID FACTOR (MULTI): Primary | ICD-10-CM

## 2024-09-25 LAB
ANA PATTERN: ABNORMAL
ANA SER QL HEP2 SUBST: POSITIVE
ANA TITR SER IF: ABNORMAL {TITER}

## 2024-09-27 LAB — 1,25(OH)2D SERPL-MCNC: 18.5 PG/ML (ref 19.9–79.3)

## 2024-10-01 LAB — SCAN RESULT: NORMAL

## 2024-10-02 RX ORDER — METHYLPREDNISOLONE 4 MG/1
TABLET ORAL
Qty: 21 TABLET | Refills: 0 | Status: SHIPPED | OUTPATIENT
Start: 2024-10-02 | End: 2024-10-03 | Stop reason: SDUPTHER

## 2024-10-03 ENCOUNTER — OFFICE VISIT (OUTPATIENT)
Dept: RHEUMATOLOGY | Facility: CLINIC | Age: 39
End: 2024-10-03
Payer: COMMERCIAL

## 2024-10-03 VITALS
DIASTOLIC BLOOD PRESSURE: 96 MMHG | HEART RATE: 87 BPM | BODY MASS INDEX: 34.16 KG/M2 | HEIGHT: 63 IN | OXYGEN SATURATION: 98 % | SYSTOLIC BLOOD PRESSURE: 162 MMHG | WEIGHT: 192.8 LBS

## 2024-10-03 DIAGNOSIS — E03.9 HYPOTHYROIDISM, UNSPECIFIED TYPE: Primary | ICD-10-CM

## 2024-10-03 DIAGNOSIS — E55.9 VITAMIN D DEFICIENCY: ICD-10-CM

## 2024-10-03 DIAGNOSIS — R76.8 POSITIVE ANA (ANTINUCLEAR ANTIBODY): ICD-10-CM

## 2024-10-03 DIAGNOSIS — Z79.899 ENCOUNTER FOR LONG-TERM (CURRENT) USE OF MEDICATIONS: ICD-10-CM

## 2024-10-03 DIAGNOSIS — M06.09 POLYARTHRITIS WITH NEGATIVE RHEUMATOID FACTOR (MULTI): ICD-10-CM

## 2024-10-03 DIAGNOSIS — M35.03 SJOGREN SYNDROME WITH MYOPATHY: ICD-10-CM

## 2024-10-03 PROCEDURE — 99214 OFFICE O/P EST MOD 30 MIN: CPT | Performed by: INTERNAL MEDICINE

## 2024-10-03 PROCEDURE — 3008F BODY MASS INDEX DOCD: CPT | Performed by: INTERNAL MEDICINE

## 2024-10-03 RX ORDER — LEVOTHYROXINE SODIUM 150 UG/1
150 TABLET ORAL DAILY
Qty: 90 TABLET | Refills: 3 | Status: SHIPPED | OUTPATIENT
Start: 2024-10-03 | End: 2025-10-03

## 2024-10-03 RX ORDER — HYDROCODONE BITARTRATE AND ACETAMINOPHEN 7.5; 325 MG/1; MG/1
1 TABLET ORAL EVERY 6 HOURS PRN
Qty: 112 TABLET | Refills: 0 | Status: CANCELLED | OUTPATIENT
Start: 2024-10-09

## 2024-10-03 RX ORDER — METHYLPREDNISOLONE 4 MG/1
TABLET ORAL
Qty: 21 TABLET | Refills: 1 | Status: SHIPPED | OUTPATIENT
Start: 2024-10-03

## 2024-10-03 ASSESSMENT — ROUTINE ASSESSMENT OF PATIENT INDEX DATA (RAPID3)
WASH_DRY_BODY: WITHOUT ANY DIFFICULTY
DRESS_YOURSELF: WITHOUT ANY DIFFICULTY
IN_OUT_TRANSPORT: WITH SOME DIFFICULTY
SEVERITY_SCORE: MODERATE SEVERITY (MS)
ON A SCALE OF ONE TO TEN, HOW MUCH PAIN HAVE YOU HAD BECAUSE OF YOUR CONDITION OVER THE PAST WEEK?: 4
WALK_KILOMETERS: WITH MUCH DIFFICULTY
ON A SCALE OF ONE TO TEN, CONSIDERING ALL THE WAYS IN WHICH ILLNESS AND HEALTH CONDITIONS MAY AFFECT YOU AT THIS TIME, PLEASE INDICATE BELOW HOW YOU ARE DOING:: 5
FN_SCORE: 3
SUM OF QUESTIONS A TO J: 9
SEVERITY_SCORE: 0
WEIGHTED_TOTAL_SCORE: 4
FEELINGS_ANXIETY_NERVOUS: WITH SOME DIFFICULTY
ON A SCALE OF ONE TO TEN, CONSIDERING ALL THE WAYS IN WHICH ILLNESS AND HEALTH CONDITIONS MAY AFFECT YOU AT THIS TIME, PLEASE INDICATE BELOW HOW YOU ARE DOING:: 5
WALK_FLAT_GROUND: WITH MUCH DIFFICULTY
TOTAL RAPID3 SCORE: 12
IN_OUT_BED: WITHOUT ANY DIFFICULTY
FEELINGS_DEPRESSION: WITHOUT ANY DIFFICULTY
PARTIPATE_RECREATIONAL_ACTIVITIES: WITH MUCH DIFFICULTY
TURN_FAUCETS_OFF: WITHOUT ANY DIFFICULTY
GOOD_NIGHTS_SLEEP: WITH MUCH DIFFICULTY
LIFT_CUP_TO_MOUTH: WITH SOME DIFFICULTY
PICK_CLOTHES_OFF_FLOOR: WITH SOME DIFFICULTY
ON A SCALE OF ONE TO TEN, HOW MUCH PAIN HAVE YOU HAD BECAUSE OF YOUR CONDITION OVER THE PAST WEEK?: 4

## 2024-10-03 ASSESSMENT — ENCOUNTER SYMPTOMS
DEPRESSION: 0
OCCASIONAL FEELINGS OF UNSTEADINESS: 0
LOSS OF SENSATION IN FEET: 0

## 2024-10-03 ASSESSMENT — PAIN - FUNCTIONAL ASSESSMENT: PAIN_FUNCTIONAL_ASSESSMENT: 0-10

## 2024-10-03 ASSESSMENT — PAIN SCALES - GENERAL: PAINLEVEL_OUTOF10: 3

## 2024-10-03 NOTE — PROGRESS NOTES
[facility]/  Rheumatology  [address], [address]  [place] [address]  Phone: [phone]  Fax: [phone]    Rheumatology Progress Note 10/11/24    Zuleyma Farrell is a 39 y.o. female here for   Chief Complaint   Patient presents with    Follow up with labs       Last Visit: 5/29/24    Rheum Hx  Referred by Dr. Milian for history of lupus, Sjogren's.  Chief complaint: Joint pain, back hip leg pain is the worst  Since 2017  Slow onset  Remittent course  Precipitating factors: Standing, walking too long  Associated symptoms: Fatigue, swelling, tingling  Better: Sitting, laying, pressure  Worse: Walking, standing, straining  Previous treatments:  Naproxensomewhat helpful  Ibuprofen somewhat helpful  Meloxicam no help  Acetaminophen no help  Steroids very helpful but makes her shaky  Norco very helpful  Plaquenil allergy agitation/suicidal ideation  Occupation: Homemaker  Seen by rheumatology before, no records available except for last note Dr. Guzmán  9/7 /2022. Per Dr. Guzmán, positive SSA deemed more likely indicative of lupus rather than  Sjogren's.  Per review of note positive MARK positive SSA diagnosed with subacute lupus per  dermatology Dr Anderson in 2014. Saw Dr. Kalpesh Cook and at [facility]?. Reaction to  Plaquenil. Not deemed to be a true allergy per Dr. Correa and advised to resume Plaquenil.  Patient declined and is here for yet another evaluation.  Took Plaquenil but had to discontinue due to allergy agitation/suicidal ideation.  Following with neuro for cervicalgia paresthesia and migraines. Treated with Ajovy and  tizanidine.  Tenderness and swelling of bilateral hands knees ankles  Tenderness elbows left shoulder area hip pelvic area lower back  Overall flulike symptoms  Left side worse headaches elbow pain, worsening leg pain with difficulty doing ADLs. Legs  shake coming down the steps.  Review of systems positive for:  Fever  Fatigue  Dry eyes dry  mouth  Sore throat  Nasal and mouth sores  Swollen tender lumps of neck  Palpitations-frequent, beta-blocker helps; seen by cardiology and diagnosed with  tachycardia  Swelling of the legs by the end of the day-ankles  GERD abdominal pain nausea vomiting diarrhea  History of kidney stones  History of anemia treated with oral iron; not requiring IV iron or blood transfusion  History of cancer  Rashes  Sun sensitivity  Joint pain swelling stiffness  Back pain that does not improve with rest  Fortical type issues  Myalgias  Weakness legs  Numbness tingling hands  History of thyroid disease  Anxiety  Family history positive for RA-mother and aunt: Hashimoto's-mother.    Component      Latest Ref Rng 9/21/2020 6/29/2022   MARK  POSITIVE… !    MARK Titer  1:160 (C)   MARK Pattern  Nuclear fine speckled… (C)   Rheumatoid Factor      0 - 20 IU/ML 10       Component      Latest Ref Rng 7/18/2023   Total Protein, Spe 6.4…    Albumin, SPE 3.9…    Alpha 1 Globulin 0.3…    Alpha 2 Globulin 0.6…    Beta Glob SPE 0.7…    Gamma Glob,Spe 1.0…    Protein Electrophoresis Comment NORMAL…    IMMUNOFIXATION INTERP NORMAL…    Transglutaminase IgA <1…    Transglutaminase IgG 9…    DEAMIDATED GLIADIN PEPTIDE IGA <1…    DEAMIDATED GLIADIN PEPTIDE IGG 1…    IgG Cardiolipin Ab 11.4…    IgM Cardiolipin Ab 0.4…    IgA Cardiolipin Ab <0.5…    Beta-2 Glyco 1 IgG 11.3…    Beta 2 Glyco 1 IgM 0.3…    Beta-2 Glyco 1 IgA <0.6…    PTT-LA 33.0…    DRVVT 37.1…    Lupus Anticoagulant Interpretation Neg   ANCA IFA Titer <1:20…    ANCA IFA Pattern None Detected…    UMPA Interpretation No M prot   Rheumatoid Factor      0 - 20 IU/ML 10    Angiotensin 1 Conv 23…    Anti-C1Q Ab, IgG (RDL) <20…    SSA ANTIBODIES >8.0… !    SSB ANTIBODIES <0.2…    HLA-B27 Dna Result NEGATIVE…    Citrulline Antibody, IgG <1…    Centromere Ab <0.2…    Anti-Chromatin <0.2…    NATALEE-1 ANTIBODY <0.2…    RNP Antibody <0.2…    RIBOSOMAL RNP AB <0.2…    SCL-70 ABS EIA <0.2…    SM Antibody  <0.2…    KAYLIE, Broad Spectrum Zeb Serum NEGATIVE Performed at [place] [address] [facility] [address]       Previous Tx  Meloxicam- some help  Tizanidine- helps with with neck pain  Methotrexate- helpful but had to discontinue due to N/V  Naproxen- somewhat helpful  Ibuprofen- somewhat helpful  Acetaminophen- no help  Steroids- very helpful but makes her shaky  Norco- very helpful  Plaquenil-allergy agitation/suicidal ideation  Sulfa allergy    Health Maintenance  DXA- none  Malignancy Hx- 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; LN clear  Component      Latest Ref Rng 5/4/2023   Lyme IgG Wblot NEGATIVE…    Lyme IgG Bands p 93…    Lyme IgM Wblot NEGATIVE…    Lyme IgM Bands No Bands Seen    Lyme Interp Negative     Component      Latest Ref Rng 7/18/2023   Hepatitis B Surface AG      NEG  NEGATIVE    Hepatitis C AB NONREACTIVE…    HIV 1/2 Antigen/Antibody Screen with Reflex to Confirmation NONREACTIVE…        Immunization History   Administered Date(s) Administered    Flu vaccine (IIV4), preservative free *Check age/dose* 09/30/2019    Pneumococcal polysaccharide vaccine, 23-valent, age 2 years and older (PNEUMOVAX 23) 10/15/2019    Tdap vaccine, age 7 year and older (BOOSTRIX, ADACEL) 03/07/2011, 10/15/2016          Past Medical History:   Diagnosis Date    Anxiety     Exertional shortness of breath 06/07/2023    Foot swelling 06/07/2023    History of cholecystectomy 01/19/2024    History of gestational diabetes mellitus (GDM) 01/19/2024    Hyperglycemia 01/19/2024    Kidney stones     Lupus     dx 09/2013    Mastodynia 06/07/2023    Other chest pain 01/10/2014    Atypical chest pain    Other conditions influencing health status 04/18/2016    Skin Lesion    Other muscle spasm 02/19/2016    Spasm of cervical paraspinous muscle    Pain in unspecified ankle and joints of unspecified foot 01/27/2015    Ankle joint pain    Pain in unspecified limb 07/29/2016    Limb pain    Pain in unspecified  upper arm 2016    Pain in axilla    Pancreatitis (HHS-HCC) 2024    Papillary microcarcinoma of thyroid (Multi) 2023    Personal history of diseases of the skin and subcutaneous tissue 2014    History of urticaria    Personal history of other diseases of the musculoskeletal system and connective tissue 2013    History of low back pain    Personal history of other diseases of the nervous system and sense organs 2016    History of tension headache    Personal history of other diseases of the respiratory system 2014    History of sinusitis    Personal history of other endocrine, nutritional and metabolic disease 2015    History of vitamin D deficiency    Personal history of other endocrine, nutritional and metabolic disease 2016    History of Hashimoto thyroiditis    Personal history of other endocrine, nutritional and metabolic disease 2016    History of hypokalemia    Personal history of other infectious and parasitic diseases 2016    History of herpes zoster    Personal history of other specified conditions 2014    History of urinary frequency    Personal history of other specified conditions 2021    History of abdominal pain    Personal history of other specified conditions 2021    History of nausea    Personal history of urinary (tract) infections 2014    History of urinary tract infection    Right upper quadrant pain 2013    Abdominal pain, RUQ (right upper quadrant)    Sleep disorder 2023    Strain of muscle, fascia and tendon at neck level, initial encounter 2016    Cervical strain    Swelling of both hands 2023    Tachycardia     Thyroid cancer (Multi)       Past Surgical History:   Procedure Laterality Date    BLADDER SUSPENSION  2021    bladder sling     SECTION, CLASSIC  06/27/2013    x3    CHOLECYSTECTOMY  2013    Cholecystectomy Laparoscopic    COLONOSCOPY  2021     GALLBLADDER SURGERY  01/21/2016    Gallbladder Surgery    HYSTERECTOMY  03/01/2022    OTHER SURGICAL HISTORY      GASTRO POLYPS REMOVED    OTHER SURGICAL HISTORY  02/2021    ABLATION    TOTAL THYROIDECTOMY  04/29/2016    Thyroid Surgery Total Thyroidectomy    TUBAL LIGATION  04/10/2013    Tubal Ligation      Current Outpatient Medications   Medication Sig Dispense Refill    Ajovy Autoinjector 225 mg/1.5 mL auto-injector Inject 1 Pen (225 mg) under the skin every 30 (thirty) days.      cyanocobalamin (Vitamin B-12) 100 mcg tablet Take 1 tablet (100 mcg) by mouth once daily.      docusate sodium (Colace) 100 mg capsule Take 1 capsule (100 mg) by mouth 2 times a day as needed for constipation (constipation). 60 capsule 5    escitalopram (Lexapro) 20 mg tablet Take 1 tablet (20 mg) by mouth once daily. 90 tablet 3    famotidine (Pepcid) 20 mg tablet Take 1 tablet (20 mg) by mouth 2 times a day. 180 tablet 3    FreeStyle Davy 3 Sensor device Please change sensor every 7days. 4 each 2    melatonin 10 mg capsule Take 1 capsule (10 mg) by mouth once daily at bedtime.      meloxicam (Mobic) 15 mg tablet Take 1 tablet (15 mg) by mouth early in the morning..      methylPREDNISolone (Medrol Dospak) 4 mg tablets Follow schedule on package instructions 21 tablet 1    metoprolol succinate XL (Toprol-XL) 25 mg 24 hr tablet Take 1 tablet (25 mg) by mouth once daily. 90 tablet 3    naloxone (Narcan) 4 mg/0.1 mL nasal spray May repeat in 2-3 minutes if needed, alternating nostrils. 2 each 0    promethazine (Phenergan) 25 mg tablet Take 1 tablet (25 mg) by mouth 3 times a day. 90 tablet 1    tiZANidine (Zanaflex) 2 mg tablet Take 1 tablet (2 mg) by mouth every 12 hours.      traZODone (Desyrel) 50 mg tablet Take 3 tablets (150 mg) by mouth as needed at bedtime for sleep. 90 tablet 3    adalimumab (Humira,CF, Pen) 40 mg/0.4 mL pen injector kit pen-injector Inject 40mg (1 pen) beneath the skin once every other week. 2 each 11     "levothyroxine (Synthroid, Levoxyl) 150 mcg tablet Take 1 tablet (150 mcg) by mouth early in the morning.. Take on an empty stomach at the same time each day, either 30 to 60 minutes prior to breakfast 90 tablet 3    oxyCODONE-acetaminophen (Percocet) 7.5-325 mg tablet Take 1 tablet by mouth every 6 hours if needed for severe pain (7 - 10). 112 tablet 0     No current facility-administered medications for this visit.      Allergies   Allergen Reactions    Hydroxychloroquine Other     \"suicidal ideation\"    Amoxicillin Hives and Rash    Methotrexate Nausea/vomiting    Adhesive Rash    Adhesive Tape-Silicones Rash    Caffeine Other and Palpitations    Hydromorphone Nausea/vomiting    Omeprazole Rash    Penicillins Rash    Sulfa (Sulfonamide Antibiotics) Swelling and Rash        Vitals:    10/03/24 1300   BP: (!) 162/96   BP Location: Left arm   Patient Position: Sitting   Pulse: 87   SpO2: 98%   Weight: 87.5 kg (192 lb 12.8 oz)   Height: 1.6 m (5' 3\")     Pain Assessment 0-10 (Numeric) Pain Score: 3, Pain Location: Generalized     Rapid 3  Function Score (FN): 3  Pain Score (PN) (0-10): 4  Patient Global (PTGL) (0-10): 5  Rapid3 Score: 12  RAPID3 Weighted Score: 4       Workup  Component      Latest Ref [facility] 9/24/2024   GLUCOSE      74 - 99 mg/dL 98    SODIUM      136 - 145 mmol/L 138    POTASSIUM      3.5 - 5.3 mmol/L 3.6    CHLORIDE      98 - 107 mmol/L 104    Bicarbonate      21 - 32 mmol/L 32    Anion Gap      10 - 20 mmol/L <7 (L)    Blood Urea Nitrogen      6 - 23 mg/dL 11    Creatinine      0.50 - 1.05 mg/dL 0.74    EGFR      >60 mL/min/1.73m*2 >90    Calcium      8.6 - 10.3 mg/dL 9.1    Albumin      3.4 - 5.0 g/dL 4.1    Alkaline Phosphatase      33 - 110 U/L 37    Total Protein      6.4 - 8.2 g/dL 6.8    AST      9 - 39 U/L 11    Bilirubin Total      0.0 - 1.2 mg/dL 0.2    ALT      7 - 45 U/L 13    Color, Urine      Light-Yellow, Yellow, Dark-Yellow  Light-Yellow    Appearance, Urine      Clear  Clear  "   Specific Gravity, Urine      1.005 - 1.035  1.022    pH, Urine      5.0, 5.5, 6.0, 6.5, 7.0, 7.5, 8.0  6.5    Protein, Urine      NEGATIVE, 10 (TRACE), 20 (TRACE) mg/dL NEGATIVE    Glucose, Urine      Normal mg/dL Normal    Blood, Urine      NEGATIVE  NEGATIVE    Ketones, Urine      NEGATIVE mg/dL NEGATIVE    Bilirubin, Urine      NEGATIVE  NEGATIVE    Urobilinogen, Urine      Normal mg/dL Normal    Nitrite, Urine      NEGATIVE  NEGATIVE    Leukocyte Esterase, Urine      NEGATIVE  NEGATIVE    Anti-SM      <1.0 AI <0.2    Anti-RNP      <1.0 AI <0.2    Anti-SM/RNP      <1.0 AI <0.2    Anti-SSA      <1.0 AI >8.0 (H)    Anti-SSB      <1.0 AI <0.2    Anti-SCL-70      <1.0 AI <0.2    Anti-NATALEE-1 IgG      <1.0 AI <0.2    Anti-Chromatin      <1.0 AI <0.2    Anti-Centromere      <1.0 AI <0.2    ANTI-RIBOSOMAL P      <1.0 AI <0.2    Anti-DNA (DS)      <5.0 IU/mL 1.0    CHOLESTEROL      0 - 199 mg/dL 235 (H)    HDL CHOLESTEROL      mg/dL 43.5    Cholesterol/HDL Ratio 5.4    LDL Calculated      <=99 mg/dL 141 (H)    VLDL      0 - 40 mg/dL 50 (H)    TRIGLYCERIDES      0 - 149 mg/dL 252 (H)    Non HDL Cholesterol      0 - 149 mg/dL 192 (H)    Albumin, Urine Random      Not established mg/L 10.0    Creatinine, Urine Random      20.0 - 320.0 mg/dL 145.2    Albumin/Creatinine Ratio      <30.0 ug/mg Creat 6.9    MARK      Negative  Positive !    MARK Pattern Speckled    MARK Titer 1:320    Hemoglobin A1C      See comment % 5.2    Estimated Average Glucose      Not Established mg/dL 103    C3 Complement      87 - 200 mg/dL 140    C4 Complement      10 - 50 mg/dL 29    C-Reactive Protein      <1.00 mg/dL 0.21    Creatine Kinase      0 - 215 U/L 40    Scan Result Vectra 27 (low)   Vitamin D, 25-Hydroxy, Total      30 - 100 ng/mL 34    Vit D, 1,25-Dihydroxy      19.9 - 79.3 pg/mL 18.5 (L)    Vitamin B12      211 - 911 pg/mL 234    Thyroid Stimulating Hormone      0.44 - 3.98 mIU/L 15.33 (H)    Extra Tube Hold for add-ons.    Thyroxine,  Free      0.61 - 1.12 ng/dL 0.94        Assessment/Plan  1. Hypothyroidism, unspecified type    2. Polyarthritis with negative rheumatoid factor (Multi)    3. Positive MARK (antinuclear antibody)    4. Sjogren syndrome with myopathy    5. Vitamin D deficiency    6. Encounter for long-term (current) use of medications           Orders Placed This Encounter   Procedures    Thyroid Stimulating Hormone    Thyroxine, Free    Triiodothyronine, Free    Triiodothyronine, Total    Albumin-Creatinine Ratio, Urine Random    C1Q Complement    C3 Complement    C4 Complement    CBC and Auto Differential    Comprehensive Metabolic Panel    C-Reactive Protein    Creatine Kinase    Creatinine, Urine Random    Sedimentation Rate    Urinalysis with Reflex Culture and Microscopic    Vectra; [facility]; 280884 - Miscellaneous Test    Vitamin D 25-Hydroxy,Total (for eval of Vitamin D levels)        Since last appt, adherent and tolerating meloxicam.  Had to discontinue methotrexate due to N/V.  Diagnosed with POTS by PCP - no tx  Denies any recent or current infection.  Not on any glucocorticoids.  BP high today, no H/A/CP, feels ok. Will be checking with her PCP. Took her BB today.  Tizanidine helps with neck pain/H/A.  Had hx of lupus but only currently joint aches  Oral sore does not appear SLE  Hx of biopsy showing SCL. She is currently not having the rash, just sun sens and has a red non tender nodules on LUE- stable  Hx of dermatofibroma removed from RLE  Tramadol not much nelp   ROS+ for intermediate sicca sx, sore throat, nasal sore and oral sore chronic- non tender, swollen glands/nodes, GERD, diarrhea and constipation/ hx IBS, sun sens, anemia- not eating well, no bleeding, joint pain, weakness hands, numbness/tingling.  Rapid 3 consistent with moderate severity.  Labs reviewed  D/w pt tx options and decided on   Trial of Humira  Continue meloxicam with plan to eventually wean off as able.  Hydrate well  Pain med  Advised of  possible side effects including drug induced lupus sx, infection, and importance of monitoring.   All questions answered.  Patient to follow up with primary care provider regarding all other medical issues not addressed today and for medical chart updating.         Since last appt, Humira on hold for now.   Pitting edema  L upper LN and due mammogram.  Meloxicam- not cause of swelling  Medrol prn  Pain medicine helpful to function.  Denies any recent or current infection.  Not on any NSAIDs or glucocorticoids.  ROS+ for drenching night sweats without fever, sicca, mucosal sores, swollen glands/nodes, cough, GERD, GI issues, sun sens, joint pain/swelling, numbness/tingling, anxiety.  On  an off painful swelling of legs, ankles and feet. Sometimes hands and fingers.  Rapid 3 consistent with moderate  severity.  Labs reviewed  D/w pt tx options   Humira on hold.  Levothyroxine- advised to take on empty stomach and without other meds. Reassess  Supportive care for LE edema  Advised of possible side effects and importance of monitoring.   All questions answered.  Patient to follow up with primary care provider regarding all other medical issues not addressed today and for medical chart updating.     Jolly Conteh MD      Patient Care Team:  HAN Aguirre as PCP - General (Family Medicine)  HAN Link as PCP - [facility] Medicaid PCP  HAN Link as PCP - [facility] PCP  Jolly Conteh MD as Consulting Physician (Rheumatology)

## 2024-10-03 NOTE — PATIENT COMMUNICATION
Can try Medrol dose pack to see if it helps with swelling and pain.   If having CP, SOB, pitting edema- recommend she see her PCP ASAP.

## 2024-10-04 LAB — C1Q SERPL-MCNC: 11.6 MG/DL (ref 10.3–20.5)

## 2024-10-07 ENCOUNTER — PATIENT MESSAGE (OUTPATIENT)
Dept: RHEUMATOLOGY | Facility: CLINIC | Age: 39
End: 2024-10-07
Payer: COMMERCIAL

## 2024-10-07 DIAGNOSIS — G89.29 OTHER CHRONIC PAIN: ICD-10-CM

## 2024-10-07 DIAGNOSIS — M06.09 POLYARTHRITIS WITH NEGATIVE RHEUMATOID FACTOR (MULTI): ICD-10-CM

## 2024-10-08 RX ORDER — OXYCODONE AND ACETAMINOPHEN 7.5; 325 MG/1; MG/1
1 TABLET ORAL EVERY 6 HOURS PRN
Qty: 112 TABLET | Refills: 0 | Status: SHIPPED | OUTPATIENT
Start: 2024-10-08

## 2024-10-18 ENCOUNTER — HOSPITAL ENCOUNTER (OUTPATIENT)
Dept: RADIOLOGY | Facility: HOSPITAL | Age: 39
Discharge: HOME | End: 2024-10-18
Payer: COMMERCIAL

## 2024-10-18 ENCOUNTER — SPECIALTY PHARMACY (OUTPATIENT)
Dept: PHARMACY | Facility: CLINIC | Age: 39
End: 2024-10-18

## 2024-10-18 ENCOUNTER — HOSPITAL ENCOUNTER (OUTPATIENT)
Dept: RADIOLOGY | Facility: HOSPITAL | Age: 39
End: 2024-10-18
Payer: COMMERCIAL

## 2024-10-18 DIAGNOSIS — N64.4 BREAST TENDERNESS IN FEMALE: ICD-10-CM

## 2024-10-18 DIAGNOSIS — R22.1 LOCALIZED SWELLING, MASS OR LUMP OF NECK: ICD-10-CM

## 2024-10-18 PROCEDURE — 77062 BREAST TOMOSYNTHESIS BI: CPT

## 2024-10-18 PROCEDURE — 76536 US EXAM OF HEAD AND NECK: CPT

## 2024-10-23 ENCOUNTER — APPOINTMENT (OUTPATIENT)
Dept: PRIMARY CARE | Facility: CLINIC | Age: 39
End: 2024-10-23
Payer: COMMERCIAL

## 2024-10-25 ENCOUNTER — OFFICE VISIT (OUTPATIENT)
Dept: OTOLARYNGOLOGY | Facility: CLINIC | Age: 39
End: 2024-10-25
Payer: COMMERCIAL

## 2024-10-25 VITALS — WEIGHT: 192 LBS | HEIGHT: 63 IN | BODY MASS INDEX: 34.02 KG/M2

## 2024-10-25 DIAGNOSIS — R59.1 LYMPHADENOPATHY: Primary | ICD-10-CM

## 2024-10-25 PROCEDURE — 4004F PT TOBACCO SCREEN RCVD TLK: CPT | Performed by: PHYSICIAN ASSISTANT

## 2024-10-25 PROCEDURE — 3008F BODY MASS INDEX DOCD: CPT | Performed by: PHYSICIAN ASSISTANT

## 2024-10-25 PROCEDURE — 31575 DIAGNOSTIC LARYNGOSCOPY: CPT | Performed by: PHYSICIAN ASSISTANT

## 2024-10-25 PROCEDURE — 99203 OFFICE O/P NEW LOW 30 MIN: CPT | Performed by: PHYSICIAN ASSISTANT

## 2024-10-25 NOTE — PROGRESS NOTES
Zuleyma Farrell is a 39 y.o. year old female patient with Swollen Glands     The patient presents to the office today for assessment and concerns over lymphadenopathy.  Patient has a prior history of thyroid cancer with previous total thyroidectomy at least 8 or more years ago.  The patient had ultrasound completed because she has left supraclavicular swelling and fullness and had incidental finding of a right level two 1.8 x 0.7 x 1.6 cm lymph node.  The patient also had a left level 1B lymph node measuring 1.3 x 1.7 x 0.6 cm.  In the patient's area of concern in the left supraclavicular region there was a noted 7 x 3 x 7 mm normal-appearing lymph node.  The patient is here today for further assessment.  The patient is a non-smoker.  All other ENT issues are negative    Review of Systems   All other systems reviewed and are negative.        Physical Exam:   General appearance: No acute distress. Normal facies. Symmetric facial movement. No gross lesions of the face are noted.  The external ear structures appear normal. The ear canals patent and the tympanic membranes are intact without evidence of air-fluid levels, retraction, or congenital defects.  Anterior rhinoscopy notes essentially a midline nasal septum. Examination is noted for normal healthy mucosal membranes without any evidence of lesions, polyps, or exudate. The tongue is normally mobile. There are no lesions on the gingiva, buccal, or oral mucosa. There are no oral cavity masses.  On physical exam I do not palpate any worrisome lymphadenopathy in level 2 but there is certainly asymmetry in the supraclavicular region with distinct fullness in the left supraclavicular aspect.  The trachea and parotid are clear. The thyroid bed is grossly unremarkable. The salivary gland structures are grossly unremarkable.  In order to assess the larynx, flexible laryngoscopy was performed based on the patient's history.    After topical anesthesia, a very complete  flexible laryngoscopy was performed.  In the nasopharynx just right of midline the patient has benign-appearing mucocele.  This examination reveals a normal appearance to the laryngeal structures including the true cords, false cords, epiglottis, base of tongue, and piriform sinus, except as noted.    Assessment/Plan   1.  Lymphadenopathy    Patient was seen in the office today for assessment of neck with lymphadenopathy.  There is certainly asymmetry in the left supraclavicular region along with other incidental lymph nodes found on ultrasound.  Patient has prior history of thyroid cancer with previous total thyroidectomy.  At this time the patient is going to be sent for dedicated CT imaging of the neck with contrast.  We will see her back to review.    All questions and concerns were answered and addressed. The patient expresses understanding and will follow up as advised.    Thank you again for allowing us to participate in the care of this patient.

## 2024-10-29 ENCOUNTER — SPECIALTY PHARMACY (OUTPATIENT)
Dept: PHARMACY | Facility: CLINIC | Age: 39
End: 2024-10-29

## 2024-10-31 ENCOUNTER — PATIENT MESSAGE (OUTPATIENT)
Dept: RHEUMATOLOGY | Facility: CLINIC | Age: 39
End: 2024-10-31
Payer: COMMERCIAL

## 2024-10-31 DIAGNOSIS — G89.29 OTHER CHRONIC PAIN: ICD-10-CM

## 2024-10-31 DIAGNOSIS — M06.09 POLYARTHRITIS WITH NEGATIVE RHEUMATOID FACTOR (MULTI): ICD-10-CM

## 2024-11-01 ENCOUNTER — PATIENT MESSAGE (OUTPATIENT)
Dept: RHEUMATOLOGY | Facility: CLINIC | Age: 39
End: 2024-11-01
Payer: COMMERCIAL

## 2024-11-01 DIAGNOSIS — R11.0 NAUSEA: ICD-10-CM

## 2024-11-01 RX ORDER — HYDROCODONE BITARTRATE AND ACETAMINOPHEN 5; 325 MG/1; MG/1
1 TABLET ORAL EVERY 4 HOURS PRN
Qty: 120 TABLET | Refills: 0 | Status: SHIPPED | OUTPATIENT
Start: 2024-11-06

## 2024-11-01 RX ORDER — OXYCODONE AND ACETAMINOPHEN 7.5; 325 MG/1; MG/1
1 TABLET ORAL EVERY 6 HOURS PRN
Qty: 112 TABLET | Refills: 0 | Status: SHIPPED | OUTPATIENT
Start: 2024-11-06 | End: 2024-11-01 | Stop reason: ALTCHOICE

## 2024-11-05 RX ORDER — PROMETHAZINE HYDROCHLORIDE 25 MG/1
25 TABLET ORAL 3 TIMES DAILY
Qty: 90 TABLET | Refills: 3 | Status: SHIPPED | OUTPATIENT
Start: 2024-11-05

## 2024-11-07 DIAGNOSIS — R59.1 LYMPHADENOPATHY: Primary | ICD-10-CM

## 2024-11-08 ENCOUNTER — LAB (OUTPATIENT)
Dept: LAB | Facility: LAB | Age: 39
End: 2024-11-08
Payer: COMMERCIAL

## 2024-11-08 ENCOUNTER — SPECIALTY PHARMACY (OUTPATIENT)
Dept: PHARMACY | Facility: CLINIC | Age: 39
End: 2024-11-08

## 2024-11-08 DIAGNOSIS — R59.1 LYMPHADENOPATHY: ICD-10-CM

## 2024-11-08 LAB
CREAT SERPL-MCNC: 0.82 MG/DL (ref 0.5–1.05)
EGFRCR SERPLBLD CKD-EPI 2021: >90 ML/MIN/1.73M*2

## 2024-11-08 PROCEDURE — 36415 COLL VENOUS BLD VENIPUNCTURE: CPT

## 2024-11-09 ENCOUNTER — HOSPITAL ENCOUNTER (OUTPATIENT)
Dept: RADIOLOGY | Facility: HOSPITAL | Age: 39
Discharge: HOME | End: 2024-11-09
Payer: COMMERCIAL

## 2024-11-09 DIAGNOSIS — R59.1 LYMPHADENOPATHY: ICD-10-CM

## 2024-11-09 PROCEDURE — 70491 CT SOFT TISSUE NECK W/DYE: CPT | Performed by: RADIOLOGY

## 2024-11-09 PROCEDURE — 2550000001 HC RX 255 CONTRASTS: Mod: SE

## 2024-11-09 PROCEDURE — 70491 CT SOFT TISSUE NECK W/DYE: CPT

## 2024-11-09 RX ADMIN — IOHEXOL 75 ML: 350 INJECTION, SOLUTION INTRAVENOUS at 15:50

## 2024-11-11 ENCOUNTER — APPOINTMENT (OUTPATIENT)
Dept: OTOLARYNGOLOGY | Facility: CLINIC | Age: 39
End: 2024-11-11
Payer: COMMERCIAL

## 2024-11-12 ENCOUNTER — SPECIALTY PHARMACY (OUTPATIENT)
Dept: PHARMACY | Facility: CLINIC | Age: 39
End: 2024-11-12

## 2024-11-14 ENCOUNTER — APPOINTMENT (OUTPATIENT)
Dept: OTOLARYNGOLOGY | Facility: CLINIC | Age: 39
End: 2024-11-14
Payer: COMMERCIAL

## 2024-11-14 DIAGNOSIS — R59.1 LYMPHADENOPATHY: Primary | ICD-10-CM

## 2024-11-14 PROCEDURE — 99441 PR PHYS/QHP TELEPHONE EVALUATION 5-10 MIN: CPT | Performed by: PHYSICIAN ASSISTANT

## 2024-11-14 NOTE — PROGRESS NOTES
Zuleyma Farrell is a 39 y.o. year old female patient contacted via telephone for visit today to discuss results.  the patient has a prior history of thyroid cancer with micropapillary carcinoma.  The patient had CT imaging of the neck performed and had some borderline enlarged lymph nodes at level 2 bilaterally and small right level 5 lymph node.  There was no evidence of recurrence of mass in the thyroid bed and no worrisome findings in the supraclavicular region.  There is likely some subtle asymmetry involving the soft tissue but no mass or lymphadenopathy in the area of concern.  She is without new complaints today.             Physical Exam:   Deferred as today's visit was a telephone visit    Assessment/Plan   1.  Lymphadenopathy    Patient and I had telephone conversation today regarding CT findings of the neck.  There were borderline enlarged lymph nodes none greater than 1 cm.  There were no other worrisome findings on exam including the area of concern involving the left supraclavicular region.  I discussed the results in detail with the patient and answered any questions or concerns.  I recommend observation and follow-up as needed.

## 2024-11-27 ENCOUNTER — SPECIALTY PHARMACY (OUTPATIENT)
Dept: PHARMACY | Facility: CLINIC | Age: 39
End: 2024-11-27

## 2024-12-04 ENCOUNTER — PATIENT MESSAGE (OUTPATIENT)
Dept: RHEUMATOLOGY | Facility: CLINIC | Age: 39
End: 2024-12-04
Payer: COMMERCIAL

## 2024-12-04 DIAGNOSIS — G89.29 OTHER CHRONIC PAIN: Primary | ICD-10-CM

## 2024-12-04 DIAGNOSIS — M06.09 POLYARTHRITIS WITH NEGATIVE RHEUMATOID FACTOR (MULTI): ICD-10-CM

## 2024-12-05 RX ORDER — FOLIC ACID 1 MG/1
TABLET ORAL
Qty: 30 TABLET | Refills: 5 | Status: SHIPPED | OUTPATIENT
Start: 2024-12-05

## 2024-12-05 RX ORDER — OXYCODONE AND ACETAMINOPHEN 7.5; 325 MG/1; MG/1
1 TABLET ORAL EVERY 4 HOURS PRN
Qty: 112 TABLET | Refills: 0 | Status: SHIPPED | OUTPATIENT
Start: 2024-12-05

## 2024-12-05 RX ORDER — METHOTREXATE 2.5 MG/1
15 TABLET ORAL
Qty: 24 TABLET | Refills: 5 | Status: SHIPPED | OUTPATIENT
Start: 2024-12-08 | End: 2025-01-07

## 2024-12-07 NOTE — PATIENT COMMUNICATION
Noted.  Did she say why not Humira? Side effects? Cost?  If tolerating and wants to stay with methotrexate only, ok (although the combination is stronger to control her inflammation). We can then see how she is doing on just the methotrexate at next appt. Thanks  
Sent different pain medicine and methotrexate.  If not doing well and can tolerate it, recommend adding methotrexate to Humira.  
No

## 2024-12-09 ENCOUNTER — SPECIALTY PHARMACY (OUTPATIENT)
Dept: PHARMACY | Facility: CLINIC | Age: 39
End: 2024-12-09

## 2024-12-17 ENCOUNTER — LAB (OUTPATIENT)
Dept: LAB | Facility: LAB | Age: 39
End: 2024-12-17
Payer: COMMERCIAL

## 2024-12-17 DIAGNOSIS — Z79.899 ENCOUNTER FOR LONG-TERM (CURRENT) USE OF MEDICATIONS: ICD-10-CM

## 2024-12-17 DIAGNOSIS — E03.9 HYPOTHYROIDISM, UNSPECIFIED TYPE: ICD-10-CM

## 2024-12-17 DIAGNOSIS — R76.8 POSITIVE ANA (ANTINUCLEAR ANTIBODY): ICD-10-CM

## 2024-12-17 DIAGNOSIS — E55.9 VITAMIN D DEFICIENCY: ICD-10-CM

## 2024-12-17 DIAGNOSIS — M06.09 POLYARTHRITIS WITH NEGATIVE RHEUMATOID FACTOR (MULTI): ICD-10-CM

## 2024-12-17 DIAGNOSIS — M35.03 SJOGREN SYNDROME WITH MYOPATHY: ICD-10-CM

## 2024-12-17 LAB
T4 FREE SERPL-MCNC: 0.88 NG/DL (ref 0.61–1.12)
TSH SERPL-ACNC: 19.03 MIU/L (ref 0.44–3.98)

## 2024-12-17 PROCEDURE — 84480 ASSAY TRIIODOTHYRONINE (T3): CPT

## 2024-12-17 PROCEDURE — 36415 COLL VENOUS BLD VENIPUNCTURE: CPT

## 2024-12-17 PROCEDURE — 84481 FREE ASSAY (FT-3): CPT

## 2024-12-18 LAB
T3 SERPL-MCNC: 84 NG/DL (ref 60–200)
T3FREE SERPL-MCNC: 2.4 PG/ML (ref 2.3–4.2)

## 2024-12-23 ENCOUNTER — TELEPHONE (OUTPATIENT)
Dept: PRIMARY CARE | Facility: CLINIC | Age: 39
End: 2024-12-23
Payer: COMMERCIAL

## 2024-12-23 DIAGNOSIS — G47.9 SLEEPING DIFFICULTIES: ICD-10-CM

## 2024-12-23 RX ORDER — TRAZODONE HYDROCHLORIDE 50 MG/1
150 TABLET ORAL NIGHTLY PRN
Qty: 90 TABLET | Refills: 1 | Status: SHIPPED | OUTPATIENT
Start: 2024-12-23

## 2024-12-23 NOTE — TELEPHONE ENCOUNTER
Eva pt needing refill of Trazadone sent to   [id number] - [place], [place] - [address] AT [address] [facility] [address]    NOV  3/3/25  LOV  7/2/24

## 2024-12-26 ENCOUNTER — PATIENT MESSAGE (OUTPATIENT)
Dept: RHEUMATOLOGY | Facility: CLINIC | Age: 39
End: 2024-12-26
Payer: COMMERCIAL

## 2024-12-26 DIAGNOSIS — M06.09 POLYARTHRITIS WITH NEGATIVE RHEUMATOID FACTOR (MULTI): ICD-10-CM

## 2024-12-26 DIAGNOSIS — G89.29 OTHER CHRONIC PAIN: ICD-10-CM

## 2024-12-30 RX ORDER — OXYCODONE AND ACETAMINOPHEN 7.5; 325 MG/1; MG/1
1 TABLET ORAL EVERY 4 HOURS PRN
Qty: 112 TABLET | Refills: 0 | Status: SHIPPED | OUTPATIENT
Start: 2024-12-30

## 2025-01-10 ENCOUNTER — PATIENT MESSAGE (OUTPATIENT)
Dept: RHEUMATOLOGY | Facility: CLINIC | Age: 40
End: 2025-01-10
Payer: COMMERCIAL

## 2025-01-10 RX ORDER — RIMEGEPANT SULFATE 75 MG/75MG
TABLET, ORALLY DISINTEGRATING ORAL
COMMUNITY
Start: 2024-12-30

## 2025-01-12 ENCOUNTER — HOSPITAL ENCOUNTER (EMERGENCY)
Facility: HOSPITAL | Age: 40
Discharge: HOME | End: 2025-01-12
Attending: EMERGENCY MEDICINE
Payer: COMMERCIAL

## 2025-01-12 VITALS
OXYGEN SATURATION: 99 % | BODY MASS INDEX: 32.81 KG/M2 | RESPIRATION RATE: 16 BRPM | HEART RATE: 89 BPM | WEIGHT: 185.19 LBS | TEMPERATURE: 97.7 F | DIASTOLIC BLOOD PRESSURE: 103 MMHG | SYSTOLIC BLOOD PRESSURE: 179 MMHG | HEIGHT: 63 IN

## 2025-01-12 DIAGNOSIS — E89.0 HYPOTHYROIDISM, POSTSURGICAL: ICD-10-CM

## 2025-01-12 DIAGNOSIS — E87.6 HYPOKALEMIA: ICD-10-CM

## 2025-01-12 DIAGNOSIS — A08.4 VIRAL GASTROENTERITIS: Primary | ICD-10-CM

## 2025-01-12 LAB
ALBUMIN SERPL BCP-MCNC: 4.4 G/DL (ref 3.4–5)
ALP SERPL-CCNC: 39 U/L (ref 33–110)
ALT SERPL W P-5'-P-CCNC: 46 U/L (ref 7–45)
ANION GAP SERPL CALCULATED.3IONS-SCNC: 13 MMOL/L (ref 10–20)
APPEARANCE UR: CLEAR
AST SERPL W P-5'-P-CCNC: 32 U/L (ref 9–39)
BASOPHILS # BLD AUTO: 0.01 X10*3/UL (ref 0–0.1)
BASOPHILS NFR BLD AUTO: 0.2 %
BILIRUB SERPL-MCNC: 0.6 MG/DL (ref 0–1.2)
BILIRUB UR STRIP.AUTO-MCNC: NEGATIVE MG/DL
BUN SERPL-MCNC: 15 MG/DL (ref 6–23)
CALCIUM SERPL-MCNC: 9.5 MG/DL (ref 8.6–10.3)
CHLORIDE SERPL-SCNC: 102 MMOL/L (ref 98–107)
CO2 SERPL-SCNC: 23 MMOL/L (ref 21–32)
COLOR UR: YELLOW
CREAT SERPL-MCNC: 0.56 MG/DL (ref 0.5–1.05)
EGFRCR SERPLBLD CKD-EPI 2021: >90 ML/MIN/1.73M*2
EOSINOPHIL # BLD AUTO: 0.01 X10*3/UL (ref 0–0.7)
EOSINOPHIL NFR BLD AUTO: 0.2 %
ERYTHROCYTE [DISTWIDTH] IN BLOOD BY AUTOMATED COUNT: 14.5 % (ref 11.5–14.5)
GLUCOSE SERPL-MCNC: 100 MG/DL (ref 74–99)
GLUCOSE UR STRIP.AUTO-MCNC: NORMAL MG/DL
HCG UR QL IA.RAPID: NEGATIVE
HCT VFR BLD AUTO: 38 % (ref 36–46)
HGB BLD-MCNC: 13.2 G/DL (ref 12–16)
IMM GRANULOCYTES # BLD AUTO: 0.01 X10*3/UL (ref 0–0.7)
IMM GRANULOCYTES NFR BLD AUTO: 0.2 % (ref 0–0.9)
KETONES UR STRIP.AUTO-MCNC: ABNORMAL MG/DL
LEUKOCYTE ESTERASE UR QL STRIP.AUTO: NEGATIVE
LIPASE SERPL-CCNC: 4 U/L (ref 9–82)
LYMPHOCYTES # BLD AUTO: 1.29 X10*3/UL (ref 1.2–4.8)
LYMPHOCYTES NFR BLD AUTO: 22 %
MCH RBC QN AUTO: 34.3 PG (ref 26–34)
MCHC RBC AUTO-ENTMCNC: 34.7 G/DL (ref 32–36)
MCV RBC AUTO: 99 FL (ref 80–100)
MONOCYTES # BLD AUTO: 0.21 X10*3/UL (ref 0.1–1)
MONOCYTES NFR BLD AUTO: 3.6 %
MUCOUS THREADS #/AREA URNS AUTO: NORMAL /LPF
NEUTROPHILS # BLD AUTO: 4.33 X10*3/UL (ref 1.2–7.7)
NEUTROPHILS NFR BLD AUTO: 73.8 %
NITRITE UR QL STRIP.AUTO: NEGATIVE
NRBC BLD-RTO: 0 /100 WBCS (ref 0–0)
PH UR STRIP.AUTO: 6.5 [PH]
PLATELET # BLD AUTO: 262 X10*3/UL (ref 150–450)
POTASSIUM SERPL-SCNC: 3.2 MMOL/L (ref 3.5–5.3)
PROT SERPL-MCNC: 7.5 G/DL (ref 6.4–8.2)
PROT UR STRIP.AUTO-MCNC: ABNORMAL MG/DL
RBC # BLD AUTO: 3.85 X10*6/UL (ref 4–5.2)
RBC # UR STRIP.AUTO: NEGATIVE /UL
RBC #/AREA URNS AUTO: NORMAL /HPF
SODIUM SERPL-SCNC: 135 MMOL/L (ref 136–145)
SP GR UR STRIP.AUTO: 1.03
SQUAMOUS #/AREA URNS AUTO: NORMAL /HPF
TSH SERPL-ACNC: 0.74 MIU/L (ref 0.44–3.98)
UROBILINOGEN UR STRIP.AUTO-MCNC: ABNORMAL MG/DL
WBC # BLD AUTO: 5.9 X10*3/UL (ref 4.4–11.3)
WBC #/AREA URNS AUTO: NORMAL /HPF

## 2025-01-12 PROCEDURE — 2500000004 HC RX 250 GENERAL PHARMACY W/ HCPCS (ALT 636 FOR OP/ED): Performed by: EMERGENCY MEDICINE

## 2025-01-12 PROCEDURE — 83690 ASSAY OF LIPASE: CPT | Performed by: EMERGENCY MEDICINE

## 2025-01-12 PROCEDURE — 99284 EMERGENCY DEPT VISIT MOD MDM: CPT | Performed by: EMERGENCY MEDICINE

## 2025-01-12 PROCEDURE — 84439 ASSAY OF FREE THYROXINE: CPT | Performed by: NURSE PRACTITIONER

## 2025-01-12 PROCEDURE — 96361 HYDRATE IV INFUSION ADD-ON: CPT

## 2025-01-12 PROCEDURE — 80053 COMPREHEN METABOLIC PANEL: CPT | Performed by: EMERGENCY MEDICINE

## 2025-01-12 PROCEDURE — 84443 ASSAY THYROID STIM HORMONE: CPT | Performed by: EMERGENCY MEDICINE

## 2025-01-12 PROCEDURE — 85025 COMPLETE CBC W/AUTO DIFF WBC: CPT | Performed by: EMERGENCY MEDICINE

## 2025-01-12 PROCEDURE — 36415 COLL VENOUS BLD VENIPUNCTURE: CPT | Performed by: EMERGENCY MEDICINE

## 2025-01-12 PROCEDURE — 81001 URINALYSIS AUTO W/SCOPE: CPT | Performed by: EMERGENCY MEDICINE

## 2025-01-12 PROCEDURE — 81025 URINE PREGNANCY TEST: CPT | Performed by: EMERGENCY MEDICINE

## 2025-01-12 PROCEDURE — 96374 THER/PROPH/DIAG INJ IV PUSH: CPT

## 2025-01-12 RX ORDER — ONDANSETRON 4 MG/1
4 TABLET, ORALLY DISINTEGRATING ORAL EVERY 8 HOURS PRN
Qty: 30 TABLET | Refills: 0 | Status: SHIPPED | OUTPATIENT
Start: 2025-01-12 | End: 2025-01-16 | Stop reason: WASHOUT

## 2025-01-12 RX ORDER — ONDANSETRON HYDROCHLORIDE 2 MG/ML
4 INJECTION, SOLUTION INTRAVENOUS ONCE
Status: COMPLETED | OUTPATIENT
Start: 2025-01-12 | End: 2025-01-12

## 2025-01-12 RX ORDER — POTASSIUM CHLORIDE 20 MEQ/1
20 TABLET, EXTENDED RELEASE ORAL 2 TIMES DAILY
Qty: 20 TABLET | Refills: 0 | Status: SHIPPED | OUTPATIENT
Start: 2025-01-12 | End: 2025-01-22

## 2025-01-12 RX ORDER — POTASSIUM CHLORIDE 20 MEQ/1
40 TABLET, EXTENDED RELEASE ORAL ONCE
Status: DISCONTINUED | OUTPATIENT
Start: 2025-01-12 | End: 2025-01-12 | Stop reason: HOSPADM

## 2025-01-12 RX ADMIN — ONDANSETRON 4 MG: 2 INJECTION INTRAMUSCULAR; INTRAVENOUS at 13:22

## 2025-01-12 RX ADMIN — SODIUM CHLORIDE 500 ML: 900 INJECTION, SOLUTION INTRAVENOUS at 13:22

## 2025-01-12 ASSESSMENT — PAIN - FUNCTIONAL ASSESSMENT: PAIN_FUNCTIONAL_ASSESSMENT: 0-10

## 2025-01-12 ASSESSMENT — COLUMBIA-SUICIDE SEVERITY RATING SCALE - C-SSRS
6. HAVE YOU EVER DONE ANYTHING, STARTED TO DO ANYTHING, OR PREPARED TO DO ANYTHING TO END YOUR LIFE?: NO
2. HAVE YOU ACTUALLY HAD ANY THOUGHTS OF KILLING YOURSELF?: NO
1. IN THE PAST MONTH, HAVE YOU WISHED YOU WERE DEAD OR WISHED YOU COULD GO TO SLEEP AND NOT WAKE UP?: NO

## 2025-01-12 ASSESSMENT — PAIN SCALES - GENERAL
PAINLEVEL_OUTOF10: 0 - NO PAIN
PAINLEVEL_OUTOF10: 0 - NO PAIN

## 2025-01-12 NOTE — DISCHARGE INSTRUCTIONS
Your symptoms are likely due to a viral infection that may last 7-10 days.  Your body will fight off this infection on its own, and the typical care is to treat the symptoms during this time.  Tylenol or ibuprofen as needed for pain or fever.  You may also take over-the-counter medications (or the prescribed medications if applicable) as needed for diarrhea or other symptoms you may have.  Small sips of fluid more frequently or better tolerate.  Eat a bland diet until symptoms improving then you may advance as tolerated to your standard diet.  Please return immediately to the emergency department if you develop any new or worsening symptoms such as a fever lasting more than 5 days, increasing abdominal pain or inability to stay hydrated with oral fluid intake.    More than 50% of abdominal pain that comes to the Emergency Department goes undiagnosed and that there were no emergent findings in your workup today.  If your symptoms get worse, developed high fever, or persistent vomiting you should come back to the Emergency Department. Often times an appendicitis, colitis, diverticulitis, cholelithiasis and many other such illnesses are undetectable early on in their course and will not be seen on the first emergency department visit.  I recommend that you be re-examined in 12-24 hours if your symptoms are not significantly improved.    Your potassium was mildly low today likely from vomiting and diarrhea.  Take supplement as needed and have this level rechecked in your doctor's office.

## 2025-01-12 NOTE — ED PROVIDER NOTES
HPI   Chief Complaint   Patient presents with    Vomiting     Pt had thyroidectomy and states levels are low, has been having n/v and diarrhea for paswt two days and not sure what is causing it       39-year-old female presents for evaluation of vomiting and diarrhea for the past 2 days.  No abdominal pain.  No fever.  No blood in emesis or stool.  She is not sure if she has a stomach flu or is concerned that it could be related to her thyroid because her TSH was high last time she went to the doctor approximately 3 weeks ago.  They did increase her levothyroxine which she has been taking but she is concerned this could be the cause.      History provided by:  Medical records, patient and parent          Patient History   Past Medical History:   Diagnosis Date    Anxiety     Exertional shortness of breath 06/07/2023    Foot swelling 06/07/2023    History of cholecystectomy 01/19/2024    History of gestational diabetes mellitus (GDM) 01/19/2024    Hyperglycemia 01/19/2024    Kidney stones     Lupus     dx 09/2013    Mastodynia 06/07/2023    Other chest pain 01/10/2014    Atypical chest pain    Other conditions influencing health status 04/18/2016    Skin Lesion    Other muscle spasm 02/19/2016    Spasm of cervical paraspinous muscle    Pain in unspecified ankle and joints of unspecified foot 01/27/2015    Ankle joint pain    Pain in unspecified limb 07/29/2016    Limb pain    Pain in unspecified upper arm 07/29/2016    Pain in axilla    Pancreatitis (HHS-HCC) 01/19/2024    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    Personal history of diseases of the skin and subcutaneous tissue 02/20/2014    History of urticaria    Personal history of other diseases of the musculoskeletal system and connective tissue 12/31/2013    History of low back pain    Personal history of other diseases of the nervous system and sense organs 02/19/2016    History of tension headache    Personal history of other diseases of the  respiratory system 2014    History of sinusitis    Personal history of other endocrine, nutritional and metabolic disease 2015    History of vitamin D deficiency    Personal history of other endocrine, nutritional and metabolic disease 2016    History of Hashimoto thyroiditis    Personal history of other endocrine, nutritional and metabolic disease 2016    History of hypokalemia    Personal history of other infectious and parasitic diseases 2016    History of herpes zoster    Personal history of other specified conditions 2014    History of urinary frequency    Personal history of other specified conditions 2021    History of abdominal pain    Personal history of other specified conditions 2021    History of nausea    Personal history of urinary (tract) infections 2014    History of urinary tract infection    Right upper quadrant pain 2013    Abdominal pain, RUQ (right upper quadrant)    Sleep disorder 2023    Strain of muscle, fascia and tendon at neck level, initial encounter 2016    Cervical strain    Swelling of both hands 2023    Tachycardia     Thyroid cancer (Multi)      Past Surgical History:   Procedure Laterality Date    BLADDER SUSPENSION  2021    bladder sling     SECTION, CLASSIC  06/27/2013    x3    CHOLECYSTECTOMY  2013    Cholecystectomy Laparoscopic    COLONOSCOPY  2021    GALLBLADDER SURGERY  2016    Gallbladder Surgery    HYSTERECTOMY  2022    OTHER SURGICAL HISTORY      GASTRO POLYPS REMOVED    OTHER SURGICAL HISTORY  2021    ABLATION    TOTAL THYROIDECTOMY  2016    Thyroid Surgery Total Thyroidectomy    TUBAL LIGATION  04/10/2013    Tubal Ligation     Family History   Problem Relation Name Age of Onset    Other (Other) Mother          Lichen sclerosus    Rheum arthritis Mother      Hypertension Mother       Social History     Tobacco Use    Smoking status: Every Day     Types:  Cigarettes     Passive exposure: Current    Smokeless tobacco: Never   Vaping Use    Vaping status: Former    Passive vaping exposure: Yes   Substance Use Topics    Alcohol use: Not Currently    Drug use: Never       Physical Exam   ED Triage Vitals [01/12/25 1309]   Temperature Heart Rate Respirations BP   36.5 °C (97.7 °F) 89 16 (!) 179/103      Pulse Ox Temp Source Heart Rate Source Patient Position   99 % Oral Monitor Sitting      BP Location FiO2 (%)     Right arm --       Physical Exam  Vitals and nursing note reviewed.     General: Vitals reviewed. Awake, alert, well-developed, well-nourished, NAD  HEENT: NC/AT, PERRL, MMM  Neck: Supple, trachea midline  Respiratory: No respiratory distress, lungs clear to auscultation bilaterally, no wheezes, rhonchi, or rales  CV: Regular rate and regular rhythm, no murmur/gallop/rubs  Abdomen/GI: Soft, non-tender, non-distended, no rebound, guarding, or rigidity, normal bowel sounds  Extremities: Moving all extremities, no deformities  Neuro: A/O, normal speech  Skin: Warm, dry. No rashes identified      ED Course & MDM   Diagnoses as of 01/12/25 1432   Viral gastroenteritis   Hypokalemia                 No data recorded     [place] Coma Scale Score: 15 (01/12/25 1314 : Scarlet Andersen RN)                           Medical Decision Making  39-year-old female presents for nausea and vomiting, diarrhea for the past 2 days. Hemodynamically stable, nontoxic. Consider acute intra-abdominal processes , however given relatively benign nonfocal and nonobstructive abdominal exam I have low suspicion for etiologies including but not limited to appendicitis, cholecystitis or other biliary pathology , diverticulitis , colitis, bowel obstruction, urinary tract infection/pyelonephritis. Low suspicion for sepsis. Likely viral, probably gastritis/gastroenteritis in etiology.  Labs without significant abnormality aside from mild hypokalemia 3.2 will start on supplementation.  Given  Zofran, IV fluids with improvement.  Given her previous thyroid disease she was concerned this could be related to her TSH which was obtained and normal.  Stable for discharge home with supportive care measures.     Discussed with patient/family that more than 50% of abdominal pain that comes to the Emergency Department goes undiagnosed and that there were no emergent findings in workup today. Discussed that certain diagnoese such as appendicitis, colitis, diverticulitis, cholelithiasis or other illnesses are undetectable early on in their course and may not be seen on the first visit.  I recommended abdominal re-examination in 12-24 hours if  symptoms are not significantly improved, sooner if worsening.      Amount and/or Complexity of Data Reviewed  Independent Historian: parent  External Data Reviewed: labs.     Details: 3 weeks ago TSH reviewed  Labs: ordered. Decision-making details documented in ED Course.        Procedure  Procedures     Maddie Urrutia MD  01/12/25 9800

## 2025-01-13 ENCOUNTER — TELEPHONE (OUTPATIENT)
Dept: PRIMARY CARE | Facility: CLINIC | Age: 40
End: 2025-01-13
Payer: COMMERCIAL

## 2025-01-13 DIAGNOSIS — F41.9 ANXIETY: ICD-10-CM

## 2025-01-13 LAB — HOLD SPECIMEN: NORMAL

## 2025-01-13 RX ORDER — ESCITALOPRAM OXALATE 20 MG/1
20 TABLET ORAL DAILY
Qty: 90 TABLET | Refills: 0 | Status: SHIPPED | OUTPATIENT
Start: 2025-01-13

## 2025-01-14 ENCOUNTER — APPOINTMENT (OUTPATIENT)
Dept: PRIMARY CARE | Facility: CLINIC | Age: 40
End: 2025-01-14
Payer: COMMERCIAL

## 2025-01-14 ENCOUNTER — OFFICE VISIT (OUTPATIENT)
Dept: PRIMARY CARE | Facility: CLINIC | Age: 40
End: 2025-01-14
Payer: COMMERCIAL

## 2025-01-14 VITALS
BODY MASS INDEX: 32.43 KG/M2 | DIASTOLIC BLOOD PRESSURE: 99 MMHG | OXYGEN SATURATION: 96 % | SYSTOLIC BLOOD PRESSURE: 189 MMHG | WEIGHT: 183 LBS | HEART RATE: 94 BPM | HEIGHT: 63 IN

## 2025-01-14 DIAGNOSIS — E03.9 HYPOTHYROIDISM, UNSPECIFIED TYPE: ICD-10-CM

## 2025-01-14 DIAGNOSIS — I10 PRIMARY HYPERTENSION: ICD-10-CM

## 2025-01-14 DIAGNOSIS — R00.2 PALPITATIONS: ICD-10-CM

## 2025-01-14 DIAGNOSIS — E89.0 HYPOTHYROIDISM, POSTSURGICAL: Primary | ICD-10-CM

## 2025-01-14 LAB — T4 FREE SERPL-MCNC: 1.31 NG/DL (ref 0.61–1.12)

## 2025-01-14 PROCEDURE — 3008F BODY MASS INDEX DOCD: CPT | Performed by: NURSE PRACTITIONER

## 2025-01-14 PROCEDURE — 99214 OFFICE O/P EST MOD 30 MIN: CPT | Performed by: NURSE PRACTITIONER

## 2025-01-14 PROCEDURE — 3077F SYST BP >= 140 MM HG: CPT | Performed by: NURSE PRACTITIONER

## 2025-01-14 PROCEDURE — 3080F DIAST BP >= 90 MM HG: CPT | Performed by: NURSE PRACTITIONER

## 2025-01-14 RX ORDER — LISINOPRIL 10 MG/1
10 TABLET ORAL DAILY
Qty: 90 TABLET | Refills: 0 | Status: SHIPPED | OUTPATIENT
Start: 2025-01-14

## 2025-01-14 RX ORDER — LEVOTHYROXINE SODIUM 137 UG/1
137 TABLET ORAL DAILY
Qty: 90 TABLET | Refills: 3 | Status: SHIPPED | OUTPATIENT
Start: 2025-01-14

## 2025-01-14 ASSESSMENT — PATIENT HEALTH QUESTIONNAIRE - PHQ9
SUM OF ALL RESPONSES TO PHQ QUESTIONS 1-9: 12
4. FEELING TIRED OR HAVING LITTLE ENERGY: MORE THAN HALF THE DAYS
10. IF YOU CHECKED OFF ANY PROBLEMS, HOW DIFFICULT HAVE THESE PROBLEMS MADE IT FOR YOU TO DO YOUR WORK, TAKE CARE OF THINGS AT HOME, OR GET ALONG WITH OTHER PEOPLE: SOMEWHAT DIFFICULT
9. THOUGHTS THAT YOU WOULD BE BETTER OFF DEAD, OR OF HURTING YOURSELF: NOT AT ALL
1. LITTLE INTEREST OR PLEASURE IN DOING THINGS: MORE THAN HALF THE DAYS
6. FEELING BAD ABOUT YOURSELF - OR THAT YOU ARE A FAILURE OR HAVE LET YOURSELF OR YOUR FAMILY DOWN: NOT AT ALL
2. FEELING DOWN, DEPRESSED OR HOPELESS: MORE THAN HALF THE DAYS
7. TROUBLE CONCENTRATING ON THINGS, SUCH AS READING THE NEWSPAPER OR WATCHING TELEVISION: MORE THAN HALF THE DAYS
3. TROUBLE FALLING OR STAYING ASLEEP OR SLEEPING TOO MUCH: MORE THAN HALF THE DAYS
8. MOVING OR SPEAKING SO SLOWLY THAT OTHER PEOPLE COULD HAVE NOTICED. OR THE OPPOSITE, BEING SO FIGETY OR RESTLESS THAT YOU HAVE BEEN MOVING AROUND A LOT MORE THAN USUAL: NOT AT ALL
5. POOR APPETITE OR OVEREATING: MORE THAN HALF THE DAYS
SUM OF ALL RESPONSES TO PHQ9 QUESTIONS 1 AND 2: 4

## 2025-01-14 NOTE — PROGRESS NOTES
"Subjective   Patient ID: Zuleyma Farrell is a 39 y.o. female who presents for Follow-up (Patient is here today for a follow up on her ER visit and to discuss her blood pressure. ).    39 year old female here for follow up ED visit.   Went tot ED for abd pain that started 3 days ago with 1 emesis. Went to ED next day. \"Waxes and wanes\". Still having diarrhea, anxiety is high. Staying at her mom's house due to anxiety- left her  and kids.   BP high- chronic  Stool brown. Pain located LT side navel.  She will have spasms in her stomach and then have loose stool and then the abdominal pain and discomfort is gone until the next time she needs to have a bowel movement  Almost down 10 lb since 10/2024  No fever but getting hot/cold flashes. Sweats at HS, clammy hands.     HX thyroid cancer. Was on T4 135 TSH was 19, was increased to 150mcg po every day approximately 1 month ago by rheum, pt changed to every other day because she she was not feeling well.  She thought that the dose was too high.  Approximately 10 days ago she started taking it daily as prescribed because her mother was upset she was not doing as that physician told her to         Review of Systems    Objective   BP (!) 189/99   Pulse 94   Ht 1.6 m (5' 3\")   Wt 83 kg (183 lb)   SpO2 96%   BMI 32.42 kg/m²     Physical Exam  Constitutional:       Appearance: Normal appearance. She is obese.   Eyes:      Extraocular Movements: Extraocular movements intact.      Pupils: Pupils are equal, round, and reactive to light.   Cardiovascular:      Rate and Rhythm: Regular rhythm. Tachycardia present.      Pulses: Normal pulses.      Heart sounds: Normal heart sounds.   Pulmonary:      Effort: Pulmonary effort is normal.      Breath sounds: Normal breath sounds.   Musculoskeletal:         General: Normal range of motion.      Cervical back: Normal range of motion and neck supple. No tenderness.   Lymphadenopathy:      Cervical: No cervical adenopathy. "   Skin:     General: Skin is warm.      Comments: Feels very clammy   Neurological:      General: No focal deficit present.      Mental Status: She is alert and oriented to person, place, and time. Mental status is at baseline.   Psychiatric:         Mood and Affect: Mood normal.         Behavior: Behavior normal.         Thought Content: Thought content normal.         Assessment/Plan   Diagnoses and all orders for this visit:  Hypothyroidism, postsurgical  Comments:  Wondering if she is hyperthyroid and experiencing symptoms given her her current presentation  Orders:  -     Thyroxine, Free; Future  Primary hypertension  Comments:  Chronic.  She requires medication for this.  She is agreeable.  Start her on lisinopril 10 mg daily  Orders:  -     lisinopril 10 mg tablet; Take 1 tablet (10 mg) by mouth once daily.  Hypothyroidism, unspecified type  -     levothyroxine (Synthroid, Levoxyl) 137 mcg tablet; Take 1 tablet (137 mcg) by mouth early in the morning..  Update the blood work and adjust the T4 accordingly.  She has a history of thyroid cancer her labs should be monitored differently.  She has an appointment in April with Dr. Regalado.  She sees her rheumatologist in February

## 2025-01-16 ENCOUNTER — PATIENT MESSAGE (OUTPATIENT)
Dept: RHEUMATOLOGY | Facility: CLINIC | Age: 40
End: 2025-01-16
Payer: COMMERCIAL

## 2025-01-16 DIAGNOSIS — G89.29 OTHER CHRONIC PAIN: ICD-10-CM

## 2025-01-16 DIAGNOSIS — M06.09 POLYARTHRITIS WITH NEGATIVE RHEUMATOID FACTOR (MULTI): ICD-10-CM

## 2025-01-16 RX ORDER — METOPROLOL SUCCINATE 25 MG/1
25 TABLET, EXTENDED RELEASE ORAL DAILY
Qty: 90 TABLET | Refills: 3 | Status: SHIPPED | OUTPATIENT
Start: 2025-01-16

## 2025-01-18 RX ORDER — OXYCODONE AND ACETAMINOPHEN 7.5; 325 MG/1; MG/1
1 TABLET ORAL EVERY 4 HOURS PRN
Qty: 112 TABLET | Refills: 0 | Status: SHIPPED | OUTPATIENT
Start: 2025-01-18

## 2025-01-23 ENCOUNTER — PATIENT MESSAGE (OUTPATIENT)
Dept: PRIMARY CARE | Facility: CLINIC | Age: 40
End: 2025-01-23
Payer: COMMERCIAL

## 2025-01-23 DIAGNOSIS — E89.0 HYPOTHYROIDISM, POSTSURGICAL: Primary | ICD-10-CM

## 2025-01-23 RX ORDER — LEVOTHYROXINE SODIUM 137 UG/1
137 TABLET ORAL DAILY
Qty: 30 TABLET | Refills: 11 | Status: SHIPPED | OUTPATIENT
Start: 2025-01-23 | End: 2026-01-23

## 2025-01-28 ENCOUNTER — TELEPHONE (OUTPATIENT)
Dept: PRIMARY CARE | Facility: CLINIC | Age: 40
End: 2025-01-28
Payer: COMMERCIAL

## 2025-02-01 LAB
25(OH)D3+25(OH)D2 SERPL-MCNC: 23 NG/ML (ref 30–100)
ALBUMIN SERPL-MCNC: 3.7 G/DL (ref 3.6–5.1)
ALP SERPL-CCNC: 35 U/L (ref 31–125)
ALT SERPL-CCNC: 11 U/L (ref 6–29)
ANION GAP SERPL CALCULATED.4IONS-SCNC: 9 MMOL/L (CALC) (ref 7–17)
AST SERPL-CCNC: 10 U/L (ref 10–30)
BASOPHILS # BLD AUTO: 11 CELLS/UL (ref 0–200)
BASOPHILS NFR BLD AUTO: 0.2 %
BILIRUB SERPL-MCNC: 0.2 MG/DL (ref 0.2–1.2)
BUN SERPL-MCNC: 11 MG/DL (ref 7–25)
C1Q SERPL-MCNC: NORMAL UG/ML
C3 SERPL-MCNC: NORMAL MG/DL
C4 SERPL-MCNC: NORMAL MG/DL
CALCIUM SERPL-MCNC: 8.8 MG/DL (ref 8.6–10.2)
CHLORIDE SERPL-SCNC: 106 MMOL/L (ref 98–110)
CK SERPL-CCNC: 31 U/L (ref 20–239)
CO2 SERPL-SCNC: 23 MMOL/L (ref 20–32)
CREAT SERPL-MCNC: 0.72 MG/DL (ref 0.5–0.99)
CRP SERPL-MCNC: NORMAL MG/L
EGFRCR SERPLBLD CKD-EPI 2021: 108 ML/MIN/1.73M2
EOSINOPHIL # BLD AUTO: 140 CELLS/UL (ref 15–500)
EOSINOPHIL NFR BLD AUTO: 2.5 %
ERYTHROCYTE [DISTWIDTH] IN BLOOD BY AUTOMATED COUNT: 14.4 % (ref 11–15)
ERYTHROCYTE [SEDIMENTATION RATE] IN BLOOD BY WESTERGREN METHOD: 14 MM/H
GLUCOSE SERPL-MCNC: 99 MG/DL (ref 65–139)
HCT VFR BLD AUTO: 36.3 % (ref 35–45)
HGB BLD-MCNC: 12.1 G/DL (ref 11.7–15.5)
LYMPHOCYTES # BLD AUTO: 2134 CELLS/UL (ref 850–3900)
LYMPHOCYTES NFR BLD AUTO: 38.1 %
MCH RBC QN AUTO: 34 PG (ref 27–33)
MCHC RBC AUTO-ENTMCNC: 33.3 G/DL (ref 32–36)
MCV RBC AUTO: 102 FL (ref 80–100)
MONOCYTES # BLD AUTO: 330 CELLS/UL (ref 200–950)
MONOCYTES NFR BLD AUTO: 5.9 %
NEUTROPHILS # BLD AUTO: 2985 CELLS/UL (ref 1500–7800)
NEUTROPHILS NFR BLD AUTO: 53.3 %
PLATELET # BLD AUTO: 341 THOUSAND/UL (ref 140–400)
PMV BLD REES-ECKER: 9.9 FL (ref 7.5–12.5)
POTASSIUM SERPL-SCNC: 3.8 MMOL/L (ref 3.5–5.3)
PROT SERPL-MCNC: 6.4 G/DL (ref 6.1–8.1)
RBC # BLD AUTO: 3.56 MILLION/UL (ref 3.8–5.1)
SODIUM SERPL-SCNC: 138 MMOL/L (ref 135–146)
T3 SERPL-MCNC: 63 NG/DL (ref 76–181)
T3FREE SERPL-MCNC: 2.4 PG/ML (ref 2.3–4.2)
TSH SERPL-ACNC: 22.2 MIU/L
WBC # BLD AUTO: 5.6 THOUSAND/UL (ref 3.8–10.8)

## 2025-02-03 LAB
25(OH)D3+25(OH)D2 SERPL-MCNC: 23 NG/ML (ref 30–100)
ALBUMIN SERPL-MCNC: 3.7 G/DL (ref 3.6–5.1)
ALP SERPL-CCNC: 35 U/L (ref 31–125)
ALT SERPL-CCNC: 11 U/L (ref 6–29)
ANION GAP SERPL CALCULATED.4IONS-SCNC: 9 MMOL/L (CALC) (ref 7–17)
AST SERPL-CCNC: 10 U/L (ref 10–30)
BASOPHILS # BLD AUTO: 11 CELLS/UL (ref 0–200)
BASOPHILS NFR BLD AUTO: 0.2 %
BILIRUB SERPL-MCNC: 0.2 MG/DL (ref 0.2–1.2)
BUN SERPL-MCNC: 11 MG/DL (ref 7–25)
C1Q SERPL-MCNC: NORMAL UG/ML
C3 SERPL-MCNC: 137 MG/DL (ref 83–193)
C4 SERPL-MCNC: 22 MG/DL (ref 15–57)
CALCIUM SERPL-MCNC: 8.8 MG/DL (ref 8.6–10.2)
CHLORIDE SERPL-SCNC: 106 MMOL/L (ref 98–110)
CK SERPL-CCNC: 31 U/L (ref 20–239)
CO2 SERPL-SCNC: 23 MMOL/L (ref 20–32)
CREAT SERPL-MCNC: 0.72 MG/DL (ref 0.5–0.99)
CRP SERPL-MCNC: <3 MG/L
EGFRCR SERPLBLD CKD-EPI 2021: 108 ML/MIN/1.73M2
EOSINOPHIL # BLD AUTO: 140 CELLS/UL (ref 15–500)
EOSINOPHIL NFR BLD AUTO: 2.5 %
ERYTHROCYTE [DISTWIDTH] IN BLOOD BY AUTOMATED COUNT: 14.4 % (ref 11–15)
ERYTHROCYTE [SEDIMENTATION RATE] IN BLOOD BY WESTERGREN METHOD: 14 MM/H
GLUCOSE SERPL-MCNC: 99 MG/DL (ref 65–139)
HCT VFR BLD AUTO: 36.3 % (ref 35–45)
HGB BLD-MCNC: 12.1 G/DL (ref 11.7–15.5)
LYMPHOCYTES # BLD AUTO: 2134 CELLS/UL (ref 850–3900)
LYMPHOCYTES NFR BLD AUTO: 38.1 %
MCH RBC QN AUTO: 34 PG (ref 27–33)
MCHC RBC AUTO-ENTMCNC: 33.3 G/DL (ref 32–36)
MCV RBC AUTO: 102 FL (ref 80–100)
MONOCYTES # BLD AUTO: 330 CELLS/UL (ref 200–950)
MONOCYTES NFR BLD AUTO: 5.9 %
NEUTROPHILS # BLD AUTO: 2985 CELLS/UL (ref 1500–7800)
NEUTROPHILS NFR BLD AUTO: 53.3 %
PLATELET # BLD AUTO: 341 THOUSAND/UL (ref 140–400)
PMV BLD REES-ECKER: 9.9 FL (ref 7.5–12.5)
POTASSIUM SERPL-SCNC: 3.8 MMOL/L (ref 3.5–5.3)
PROT SERPL-MCNC: 6.4 G/DL (ref 6.1–8.1)
RBC # BLD AUTO: 3.56 MILLION/UL (ref 3.8–5.1)
SODIUM SERPL-SCNC: 138 MMOL/L (ref 135–146)
T3 SERPL-MCNC: 63 NG/DL (ref 76–181)
T3FREE SERPL-MCNC: 2.4 PG/ML (ref 2.3–4.2)
TSH SERPL-ACNC: 22.2 MIU/L
WBC # BLD AUTO: 5.6 THOUSAND/UL (ref 3.8–10.8)

## 2025-02-05 ENCOUNTER — APPOINTMENT (OUTPATIENT)
Dept: RHEUMATOLOGY | Facility: CLINIC | Age: 40
End: 2025-02-05
Payer: COMMERCIAL

## 2025-02-06 ENCOUNTER — HOSPITAL ENCOUNTER (EMERGENCY)
Facility: HOSPITAL | Age: 40
Discharge: HOME | End: 2025-02-06
Payer: COMMERCIAL

## 2025-02-06 ENCOUNTER — APPOINTMENT (OUTPATIENT)
Dept: RADIOLOGY | Facility: HOSPITAL | Age: 40
End: 2025-02-06
Payer: COMMERCIAL

## 2025-02-06 VITALS
TEMPERATURE: 98.1 F | WEIGHT: 184.97 LBS | OXYGEN SATURATION: 98 % | SYSTOLIC BLOOD PRESSURE: 128 MMHG | RESPIRATION RATE: 18 BRPM | DIASTOLIC BLOOD PRESSURE: 80 MMHG | BODY MASS INDEX: 32.77 KG/M2 | HEART RATE: 73 BPM | HEIGHT: 63 IN

## 2025-02-06 DIAGNOSIS — R05.1 ACUTE COUGH: ICD-10-CM

## 2025-02-06 DIAGNOSIS — J18.9 ATYPICAL PNEUMONIA: ICD-10-CM

## 2025-02-06 DIAGNOSIS — J10.1 INFLUENZA A: Primary | ICD-10-CM

## 2025-02-06 LAB
FLUAV RNA RESP QL NAA+PROBE: DETECTED
FLUBV RNA RESP QL NAA+PROBE: NOT DETECTED
RSV RNA RESP QL NAA+PROBE: NOT DETECTED
SARS-COV-2 RNA RESP QL NAA+PROBE: NOT DETECTED

## 2025-02-06 PROCEDURE — 87637 SARSCOV2&INF A&B&RSV AMP PRB: CPT | Performed by: PHYSICIAN ASSISTANT

## 2025-02-06 PROCEDURE — 71045 X-RAY EXAM CHEST 1 VIEW: CPT | Mod: FOREIGN READ | Performed by: RADIOLOGY

## 2025-02-06 PROCEDURE — 99284 EMERGENCY DEPT VISIT MOD MDM: CPT | Mod: 25

## 2025-02-06 PROCEDURE — 94640 AIRWAY INHALATION TREATMENT: CPT

## 2025-02-06 PROCEDURE — 2500000004 HC RX 250 GENERAL PHARMACY W/ HCPCS (ALT 636 FOR OP/ED): Performed by: PHYSICIAN ASSISTANT

## 2025-02-06 PROCEDURE — 71046 X-RAY EXAM CHEST 2 VIEWS: CPT

## 2025-02-06 PROCEDURE — 2500000002 HC RX 250 W HCPCS SELF ADMINISTERED DRUGS (ALT 637 FOR MEDICARE OP, ALT 636 FOR OP/ED): Performed by: PHYSICIAN ASSISTANT

## 2025-02-06 RX ORDER — ALBUTEROL SULFATE 90 UG/1
2 INHALANT RESPIRATORY (INHALATION) EVERY 4 HOURS PRN
Qty: 18 G | Refills: 0 | Status: SHIPPED | OUTPATIENT
Start: 2025-02-06 | End: 2025-03-08

## 2025-02-06 RX ORDER — PREDNISONE 20 MG/1
60 TABLET ORAL ONCE
Status: COMPLETED | OUTPATIENT
Start: 2025-02-06 | End: 2025-02-06

## 2025-02-06 RX ORDER — OSELTAMIVIR PHOSPHATE 75 MG/1
75 CAPSULE ORAL EVERY 12 HOURS
Qty: 10 CAPSULE | Refills: 0 | Status: SHIPPED | OUTPATIENT
Start: 2025-02-06 | End: 2025-02-11

## 2025-02-06 RX ORDER — IPRATROPIUM BROMIDE AND ALBUTEROL SULFATE 2.5; .5 MG/3ML; MG/3ML
3 SOLUTION RESPIRATORY (INHALATION) ONCE
Status: COMPLETED | OUTPATIENT
Start: 2025-02-06 | End: 2025-02-06

## 2025-02-06 RX ORDER — AZITHROMYCIN 250 MG/1
250 TABLET, FILM COATED ORAL DAILY
Qty: 6 TABLET | Refills: 0 | Status: SHIPPED | OUTPATIENT
Start: 2025-02-06 | End: 2025-02-11

## 2025-02-06 RX ADMIN — PREDNISONE 60 MG: 20 TABLET ORAL at 16:23

## 2025-02-06 RX ADMIN — IPRATROPIUM BROMIDE AND ALBUTEROL SULFATE 3 ML: 2.5; .5 SOLUTION RESPIRATORY (INHALATION) at 16:23

## 2025-02-06 ASSESSMENT — PAIN - FUNCTIONAL ASSESSMENT: PAIN_FUNCTIONAL_ASSESSMENT: 0-10

## 2025-02-06 ASSESSMENT — PAIN SCALES - GENERAL: PAINLEVEL_OUTOF10: 7

## 2025-02-06 NOTE — ED PROVIDER NOTES
HPI   Chief Complaint   Patient presents with    Cough     Pt complains of a cough.        HPI  39-year-old female coming in for complaint of 2 to 3-day history of cough.  Patient says that she is not having any fevers or chills, mild nausea occasionally but no vomiting, no diarrhea no abdominal pain.  States that the cough is generally dry nonproductive.      Patient History   Past Medical History:   Diagnosis Date    Anxiety     Exertional shortness of breath 06/07/2023    Foot swelling 06/07/2023    History of cholecystectomy 01/19/2024    History of gestational diabetes mellitus (GDM) 01/19/2024    Hyperglycemia 01/19/2024    Kidney stones     Lupus     dx 09/2013    Mastodynia 06/07/2023    Other chest pain 01/10/2014    Atypical chest pain    Other conditions influencing health status 04/18/2016    Skin Lesion    Other muscle spasm 02/19/2016    Spasm of cervical paraspinous muscle    Pain in unspecified ankle and joints of unspecified foot 01/27/2015    Ankle joint pain    Pain in unspecified limb 07/29/2016    Limb pain    Pain in unspecified upper arm 07/29/2016    Pain in axilla    Pancreatitis (HHS-HCC) 01/19/2024    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    Personal history of diseases of the skin and subcutaneous tissue 02/20/2014    History of urticaria    Personal history of other diseases of the musculoskeletal system and connective tissue 12/31/2013    History of low back pain    Personal history of other diseases of the nervous system and sense organs 02/19/2016    History of tension headache    Personal history of other diseases of the respiratory system 02/11/2014    History of sinusitis    Personal history of other endocrine, nutritional and metabolic disease 01/27/2015    History of vitamin D deficiency    Personal history of other endocrine, nutritional and metabolic disease 05/02/2016    History of Hashimoto thyroiditis    Personal history of other endocrine, nutritional and  metabolic disease 2016    History of hypokalemia    Personal history of other infectious and parasitic diseases 2016    History of herpes zoster    Personal history of other specified conditions 2014    History of urinary frequency    Personal history of other specified conditions 2021    History of abdominal pain    Personal history of other specified conditions 2021    History of nausea    Personal history of urinary (tract) infections 2014    History of urinary tract infection    Right upper quadrant pain 2013    Abdominal pain, RUQ (right upper quadrant)    Sleep disorder 2023    Strain of muscle, fascia and tendon at neck level, initial encounter 2016    Cervical strain    Swelling of both hands 2023    Tachycardia     Thyroid cancer (Multi)      Past Surgical History:   Procedure Laterality Date    BLADDER SUSPENSION  2021    bladder sling     SECTION, CLASSIC  06/27/2013    x3    CHOLECYSTECTOMY  2013    Cholecystectomy Laparoscopic    COLONOSCOPY  2021    GALLBLADDER SURGERY  2016    Gallbladder Surgery    HYSTERECTOMY  2022    OTHER SURGICAL HISTORY      GASTRO POLYPS REMOVED    OTHER SURGICAL HISTORY  2021    ABLATION    TOTAL THYROIDECTOMY  2016    Thyroid Surgery Total Thyroidectomy    TUBAL LIGATION  04/10/2013    Tubal Ligation     Family History   Problem Relation Name Age of Onset    Other (Other) Mother          Lichen sclerosus    Rheum arthritis Mother      Hypertension Mother       Social History     Tobacco Use    Smoking status: Every Day     Types: Cigarettes     Passive exposure: Current    Smokeless tobacco: Never   Vaping Use    Vaping status: Former    Passive vaping exposure: Yes   Substance Use Topics    Alcohol use: Not Currently    Drug use: Never       Physical Exam   ED Triage Vitals   Temp Pulse Resp BP   -- -- -- --      SpO2 Temp src Heart Rate Source Patient Position   -- -- -- --       BP Location FiO2 (%)     -- --       Physical Exam    PHYSICAL EXAMINATION    GENERAL APPEARANCE: Awake and alert.     VITAL SIGNS: As per the nurses' triage record.     HEENT: Normocephalic, atraumatic. Extraocular muscles are intact. Pupils equal round and reactive to light. Conjunctiva are pink. Negative scleral icterus. Mucous membranes are moist. Tongue in the midline. Pharynx was without erythema or exudates, uvula midline    NECK: Soft Nontender and supple, full gross ROM, no meningeal signs.    CHEST: Nontender to palpation.  Patient has some rhonchi with some expiratory wheezing at the left base, otherwise good air exchange on the contralateral side    HEART: S1, S2. Regular rate and rhythm. No murmurs, gallops or rubs.  Strong and equal pulses in the extremities.     ABDOMEN: Soft, nontender, nondistended, positive bowel sounds, no palpable masses.    MUSCULOSKELETAL: The calves are nontender to palpation. Full gross active range of motion. Ambulating on own with no acute difficulties     NEUROLOGICAL: Awake, alert and oriented x 3. Power intact in the upper and lower extremities. Sensation is intact to light touch in the upper and lower extremities.     IMMUNOLOGICAL: No lymphatic streaking noted     DERM: No petechiae, rashes, or ecchymoses.  ED Course & MDM   Diagnoses as of 02/06/25 1717   Influenza A   Acute cough   Atypical pneumonia                 No data recorded     [place] Coma Scale Score: 15 (02/06/25 1617 : Scarlet Gooden RN)                           Medical Decision Making  Parts of this chart have been completed using voice recognition software. Please excuse any errors of transcription.  My thought process and reason for plan has been formulated from the time that I saw the patient until the time of disposition and is not specific to one specific moment during their visit and furthermore my MDM encompasses this entire chart and not only this text box.      HPI: Detailed  above.    Exam: A medically appropriate exam performed, outlined above, given the known history and presentation.    History Limited by: Nothing    History obtained from: Patient    External/internal records reviewed: No external records reviewed lives at home    Social Determinants of Health considered during this visit: Lives at home    Chronic conditions impacting care: Denies    Medications given during visit:  Medications   ipratropium-albuteroL (Duo-Neb) 0.5-2.5 mg/3 mL nebulizer solution 3 mL (3 mL nebulization Given 2/6/25 1623)   predniSONE (Deltasone) tablet 60 mg (60 mg oral Given 2/6/25 1623)        Diagnostic/tests  Labs Reviewed   SARS-COV-2 AND INFLUENZA A/B PCR - Abnormal       Result Value    Flu A Result Detected (*)     Flu B Result Not Detected      Coronavirus 2019, PCR Not Detected      Narrative:     This assay is an FDA-cleared, in vitro diagnostic nucleic acid amplification test for the qualitative detection and differentiation of SARS CoV-2/ Influenza A/B from nasopharyngeal specimens collected from individuals with signs and symptoms of respiratory tract infections, and has been validated for use at [facility]. Negative results do not preclude COVID-19/ Influenza A/B infections and should not be used as the sole basis for diagnosis, treatment, or other management decisions. Testing for SARS CoV-2 is recommended only for patients who meet current clinical and/or epidemiological criteria defined by federal, state, or local public health directives.   RSV PCR - Normal    RSV PCR Not Detected      Narrative:     This assay is an FDA-cleared, in vitro diagnostic nucleic acid amplification test for the detection of RSV from nasopharyngeal specimens, and has been validated for use at [facility]. Negative results do not preclude RSV infections, and should not be used as the sole basis for diagnosis, treatment, or other management decisions. If  Influenza A/B and RSV PCR results are negative, testing for Parainfluenza virus, Adenovirus and Metapneumovirus is routinely performed for pediatric oncology and intensive care inpatients at [facility], and is available on other patients by placing an add-on request.          XR chest 2 views   Final Result   No focal regions of airspace consolidation.  Normal cardiac size.   Signed by Josse Thomas MD                 Considerations/further MDM:  I estimate there is LOW risk for airway compromise requiring admission.  I have considered:  EPIGLOTTITIS, PNEUMONIA, MENINGITIS, OR URINARY TRACT INFECTION, PULMONARY CONTUSION, RESPIRATORY DISTRESS, EPIGLOTITIS, SINUSITIS, PULMONARY EFFUSION, CAD, ACS, RIB FRACTURE, ESOPHOGEAL VARICIES, RETAINED FB,   thus I consider the discharge disposition reasonable. Also, there is no evidence for peritonitis, sepsis, or toxicity. We have discussed the diagnosis and risks, and we agree with discharging home to follow-up with their primary doctor. We also discussed returning to the Emergency Department immediately if new or worsening symptoms occur. We have discussed the symptoms which are most concerning (e.g., changing or worsening pain, trouble swallowing or breathing, neck stiffness, fever) that necessitate immediate return.    No sign of respiratory distress, no clear evidence of pneumonia on chest x-ray however there is a symmetric breath sounds.  The patient will be treated for atypical pneumonia as well as influenza.  Return precaution follow-up instructions discussed.  We are seeing a fair amount of atypical and walking pneumonia etiologies within the community at this time.      Procedure  Procedures     Hermilo Cardoso PA-C  02/06/25 0776

## 2025-02-06 NOTE — DISCHARGE INSTRUCTIONS
You have the flu.  You are contagious.  I advised that you use good handwashing and facial covering techniques and limit exposure to others as much as possible.        Be sure to follow up as directed in 1-2 days.  All of the details of your follow up instructions are detailed in the follow up section of this packet.           It is important to remember that your care does not end here and you must continue to monitor your condition closely. Please return to the emergency department for any worsening or concerning signs or symptoms as directed by our conversations and the discharge instructions. Otherwise please follow up with your doctor in 2 days if no better or worse. If you do not have a doctor please contact the referral number on your discharge instructions. Please contact any physician specialists provided in your discharge notes as it is very important to follow up with them regarding your condition. If you are unable to reach the physicians provided, please come back to the Emergency Department at any time.        Return to emergency room without delay for ANY new or worsening pains or for any other symptoms or concerns.

## 2025-02-07 DIAGNOSIS — M06.09 POLYARTHRITIS WITH NEGATIVE RHEUMATOID FACTOR (MULTI): ICD-10-CM

## 2025-02-07 DIAGNOSIS — G89.29 OTHER CHRONIC PAIN: ICD-10-CM

## 2025-02-07 RX ORDER — OXYCODONE AND ACETAMINOPHEN 7.5; 325 MG/1; MG/1
1 TABLET ORAL EVERY 4 HOURS PRN
Qty: 112 TABLET | Refills: 0 | Status: SHIPPED | OUTPATIENT
Start: 2025-02-07

## 2025-02-07 NOTE — TELEPHONE ENCOUNTER
PT CALLED TO REQUEST REFILL OF PERCOCET. PLEASE SEND TO [facility] IN [place].    ALSO, FIORDALIZA PT HAS INFLUENZA A & PNEUMONIA. SHE WENT TO ER AFTER CANCELLING APPT EARLIER THIS WEEK, IS ON ZITHROMAX & WAITING FOR HER PHARMACY TO GET TAMIFLU.

## 2025-02-12 ENCOUNTER — OFFICE VISIT (OUTPATIENT)
Dept: RHEUMATOLOGY | Facility: CLINIC | Age: 40
End: 2025-02-12
Payer: COMMERCIAL

## 2025-02-12 VITALS
WEIGHT: 186 LBS | RESPIRATION RATE: 17 BRPM | OXYGEN SATURATION: 98 % | SYSTOLIC BLOOD PRESSURE: 140 MMHG | HEART RATE: 78 BPM | DIASTOLIC BLOOD PRESSURE: 80 MMHG | HEIGHT: 63 IN | BODY MASS INDEX: 32.96 KG/M2 | TEMPERATURE: 96.8 F

## 2025-02-12 DIAGNOSIS — G89.29 OTHER CHRONIC PAIN: ICD-10-CM

## 2025-02-12 DIAGNOSIS — R11.0 NAUSEA: ICD-10-CM

## 2025-02-12 DIAGNOSIS — M35.03 SJOGREN SYNDROME WITH MYOPATHY: ICD-10-CM

## 2025-02-12 DIAGNOSIS — Z79.899 ENCOUNTER FOR LONG-TERM (CURRENT) USE OF MEDICATIONS: ICD-10-CM

## 2025-02-12 DIAGNOSIS — M06.09 POLYARTHRITIS WITH NEGATIVE RHEUMATOID FACTOR (MULTI): ICD-10-CM

## 2025-02-12 DIAGNOSIS — E03.9 HYPOTHYROIDISM, UNSPECIFIED TYPE: Primary | ICD-10-CM

## 2025-02-12 DIAGNOSIS — R73.01 ELEVATED FASTING GLUCOSE: ICD-10-CM

## 2025-02-12 DIAGNOSIS — R76.8 POSITIVE ANA (ANTINUCLEAR ANTIBODY): ICD-10-CM

## 2025-02-12 DIAGNOSIS — E55.9 VITAMIN D DEFICIENCY: ICD-10-CM

## 2025-02-12 LAB
25(OH)D3+25(OH)D2 SERPL-MCNC: 23 NG/ML (ref 30–100)
ALBUMIN SERPL-MCNC: 3.7 G/DL (ref 3.6–5.1)
ALP SERPL-CCNC: 35 U/L (ref 31–125)
ALT SERPL-CCNC: 11 U/L (ref 6–29)
ANION GAP SERPL CALCULATED.4IONS-SCNC: 9 MMOL/L (CALC) (ref 7–17)
AST SERPL-CCNC: 10 U/L (ref 10–30)
BASOPHILS # BLD AUTO: 11 CELLS/UL (ref 0–200)
BASOPHILS NFR BLD AUTO: 0.2 %
BILIRUB SERPL-MCNC: 0.2 MG/DL (ref 0.2–1.2)
BUN SERPL-MCNC: 11 MG/DL (ref 7–25)
C1Q SERPL-MCNC: 7.8 MG/DL (ref 5–8.6)
C3 SERPL-MCNC: 137 MG/DL (ref 83–193)
C4 SERPL-MCNC: 22 MG/DL (ref 15–57)
CALCIUM SERPL-MCNC: 8.8 MG/DL (ref 8.6–10.2)
CHLORIDE SERPL-SCNC: 106 MMOL/L (ref 98–110)
CK SERPL-CCNC: 31 U/L (ref 20–239)
CO2 SERPL-SCNC: 23 MMOL/L (ref 20–32)
CREAT SERPL-MCNC: 0.72 MG/DL (ref 0.5–0.99)
CRP SERPL-MCNC: <3 MG/L
EGFRCR SERPLBLD CKD-EPI 2021: 108 ML/MIN/1.73M2
EOSINOPHIL # BLD AUTO: 140 CELLS/UL (ref 15–500)
EOSINOPHIL NFR BLD AUTO: 2.5 %
ERYTHROCYTE [DISTWIDTH] IN BLOOD BY AUTOMATED COUNT: 14.4 % (ref 11–15)
ERYTHROCYTE [SEDIMENTATION RATE] IN BLOOD BY WESTERGREN METHOD: 14 MM/H
GLUCOSE SERPL-MCNC: 99 MG/DL (ref 65–139)
HCT VFR BLD AUTO: 36.3 % (ref 35–45)
HGB BLD-MCNC: 12.1 G/DL (ref 11.7–15.5)
LYMPHOCYTES # BLD AUTO: 2134 CELLS/UL (ref 850–3900)
LYMPHOCYTES NFR BLD AUTO: 38.1 %
MCH RBC QN AUTO: 34 PG (ref 27–33)
MCHC RBC AUTO-ENTMCNC: 33.3 G/DL (ref 32–36)
MCV RBC AUTO: 102 FL (ref 80–100)
MONOCYTES # BLD AUTO: 330 CELLS/UL (ref 200–950)
MONOCYTES NFR BLD AUTO: 5.9 %
NEUTROPHILS # BLD AUTO: 2985 CELLS/UL (ref 1500–7800)
NEUTROPHILS NFR BLD AUTO: 53.3 %
PLATELET # BLD AUTO: 341 THOUSAND/UL (ref 140–400)
PMV BLD REES-ECKER: 9.9 FL (ref 7.5–12.5)
POTASSIUM SERPL-SCNC: 3.8 MMOL/L (ref 3.5–5.3)
PROT SERPL-MCNC: 6.4 G/DL (ref 6.1–8.1)
RBC # BLD AUTO: 3.56 MILLION/UL (ref 3.8–5.1)
SODIUM SERPL-SCNC: 138 MMOL/L (ref 135–146)
T3 SERPL-MCNC: 63 NG/DL (ref 76–181)
T3FREE SERPL-MCNC: 2.4 PG/ML (ref 2.3–4.2)
TSH SERPL-ACNC: 22.2 MIU/L
WBC # BLD AUTO: 5.6 THOUSAND/UL (ref 3.8–10.8)

## 2025-02-12 PROCEDURE — 3008F BODY MASS INDEX DOCD: CPT | Performed by: INTERNAL MEDICINE

## 2025-02-12 PROCEDURE — 99214 OFFICE O/P EST MOD 30 MIN: CPT | Performed by: INTERNAL MEDICINE

## 2025-02-12 RX ORDER — PROMETHAZINE HYDROCHLORIDE 25 MG/1
25 TABLET ORAL 3 TIMES DAILY
Qty: 90 TABLET | Refills: 3 | Status: SHIPPED | OUTPATIENT
Start: 2025-02-12

## 2025-02-12 RX ORDER — ERGOCALCIFEROL 1.25 MG/1
50000 CAPSULE ORAL
Qty: 12 CAPSULE | Refills: 1 | Status: SHIPPED | OUTPATIENT
Start: 2025-02-16 | End: 2025-05-17

## 2025-02-12 RX ORDER — SYRINGE,SAFETY WITH NEEDLE,3ML 25GX5/8"
1 SYRINGE, EMPTY DISPOSABLE MISCELLANEOUS
Qty: 100 EACH | Refills: 3 | Status: SHIPPED | OUTPATIENT
Start: 2025-02-12

## 2025-02-12 RX ORDER — METHOTREXATE 25 MG/ML
12.5 INJECTION INTRA-ARTERIAL; INTRAMUSCULAR; INTRATHECAL; INTRAVENOUS ONCE
Qty: 2 ML | Refills: 5 | Status: SHIPPED | OUTPATIENT
Start: 2025-02-12 | End: 2025-02-12

## 2025-02-12 RX ORDER — FOLIC ACID 1 MG/1
TABLET ORAL
Qty: 30 TABLET | Refills: 5 | Status: SHIPPED | OUTPATIENT
Start: 2025-02-12

## 2025-02-12 RX ORDER — BLOOD-GLUCOSE SENSOR
EACH MISCELLANEOUS
Qty: 2 EACH | Refills: 11 | Status: SHIPPED | OUTPATIENT
Start: 2025-02-12

## 2025-02-12 ASSESSMENT — ROUTINE ASSESSMENT OF PATIENT INDEX DATA (RAPID3)
SUM OF QUESTIONS A TO J: 6
PARTIPATE_RECREATIONAL_ACTIVITIES: WITH SOME DIFFICULTY
IN_OUT_TRANSPORT: WITHOUT ANY DIFFICULTY
DRESS_YOURSELF: WITHOUT ANY DIFFICULTY
ON A SCALE OF ONE TO TEN, CONSIDERING ALL THE WAYS IN WHICH ILLNESS AND HEALTH CONDITIONS MAY AFFECT YOU AT THIS TIME, PLEASE INDICATE BELOW HOW YOU ARE DOING:: 4
SEVERITY_SCORE: 0
TOTAL RAPID3 SCORE: 10
WALK_FLAT_GROUND: WITH SOME DIFFICULTY
WEIGHTED_TOTAL_SCORE: 3.33
WASH_DRY_BODY: WITHOUT ANY DIFFICULTY
FEELINGS_DEPRESSION: WITH SOME DIFFICULTY
TURN_FAUCETS_OFF: WITHOUT ANY DIFFICULTY
ON A SCALE OF ONE TO TEN, HOW MUCH PAIN HAVE YOU HAD BECAUSE OF YOUR CONDITION OVER THE PAST WEEK?: 4
WALK_KILOMETERS: WITH MUCH DIFFICULTY
FN_SCORE: 2
SEVERITY_SCORE: MODERATE SEVERITY (MS)
ON A SCALE OF ONE TO TEN, CONSIDERING ALL THE WAYS IN WHICH ILLNESS AND HEALTH CONDITIONS MAY AFFECT YOU AT THIS TIME, PLEASE INDICATE BELOW HOW YOU ARE DOING:: 4
FEELINGS_ANXIETY_NERVOUS: WITH SOME DIFFICULTY
PICK_CLOTHES_OFF_FLOOR: WITH SOME DIFFICULTY
GOOD_NIGHTS_SLEEP: WITH MUCH DIFFICULTY
LIFT_CUP_TO_MOUTH: WITHOUT ANY DIFFICULTY
ON A SCALE OF ONE TO TEN, HOW MUCH PAIN HAVE YOU HAD BECAUSE OF YOUR CONDITION OVER THE PAST WEEK?: 4
IN_OUT_BED: WITH SOME DIFFICULTY

## 2025-02-12 ASSESSMENT — PAIN SCALES - GENERAL: PAINLEVEL_OUTOF10: 4

## 2025-02-12 NOTE — PROGRESS NOTES
[facility]/  Rheumatology  [facility], [address]  [place] [address]  Phone: [phone]  Fax: [phone]    Rheumatology Progress Note 02/12/25    Zuleyma Farrell is a 39 y.o. female here for   Chief Complaint   Patient presents with    Follow-up       Last Visit: 5/29/24    Rheum Hx  Referred by Dr. Milian for history of lupus, Sjogren's.  Chief complaint: Joint pain, back hip leg pain is the worst  Since 2017  Slow onset  Remittent course  Precipitating factors: Standing, walking too long  Associated symptoms: Fatigue, swelling, tingling  Better: Sitting, laying, pressure  Worse: Walking, standing, straining  Previous treatments:  Naproxensomewhat helpful  Ibuprofen somewhat helpful  Meloxicam no help  Acetaminophen no help  Steroids very helpful but makes her shaky  Norco very helpful  Plaquenil allergy agitation/suicidal ideation  Occupation: Homemaker  Seen by rheumatology before, no records available except for last note Dr. Guzmán  9/7 /2022. Per Dr. Guzmán, positive SSA deemed more likely indicative of lupus rather than  Sjogren's.  Per review of note positive MARK positive SSA diagnosed with subacute lupus per  dermatology Dr Anderson in 2014. Saw Dr. Kalpesh Cook and at [facility]?. Reaction to  Plaquenil. Not deemed to be a true allergy per Dr. Correa and advised to resume Plaquenil.  Patient declined and is here for yet another evaluation.  Took Plaquenil but had to discontinue due to allergy agitation/suicidal ideation.  Following with neuro for cervicalgia paresthesia and migraines. Treated with Ajovy and  tizanidine.  Tenderness and swelling of bilateral hands knees ankles  Tenderness elbows left shoulder area hip pelvic area lower back  Overall flulike symptoms  Left side worse headaches elbow pain, worsening leg pain with difficulty doing ADLs. Legs  shake coming down the steps.  Review of systems positive for:  Fever  Fatigue  Dry eyes dry mouth  Sore  throat  Nasal and mouth sores  Swollen tender lumps of neck  Palpitations-frequent, beta-blocker helps; seen by cardiology and diagnosed with  tachycardia  Swelling of the legs by the end of the day-ankles  GERD abdominal pain nausea vomiting diarrhea  History of kidney stones  History of anemia treated with oral iron; not requiring IV iron or blood transfusion  History of cancer  Rashes  Sun sensitivity  Joint pain swelling stiffness  Back pain that does not improve with rest  Fortical type issues  Myalgias  Weakness legs  Numbness tingling hands  History of thyroid disease  Anxiety  Family history positive for RA-mother and aunt: Hashimoto's-mother.    Component      Latest Ref Rng 9/21/2020 6/29/2022   MARK  POSITIVE… !    MARK Titer  1:160 (C)   MARK Pattern  Nuclear fine speckled… (C)   Rheumatoid Factor      0 - 20 IU/ML 10       Component      Latest Ref Rng 7/18/2023   Total Protein, Spe 6.4…    Albumin, SPE 3.9…    Alpha 1 Globulin 0.3…    Alpha 2 Globulin 0.6…    Beta Glob SPE 0.7…    Gamma Glob,Spe 1.0…    Protein Electrophoresis Comment NORMAL…    IMMUNOFIXATION INTERP NORMAL…    Transglutaminase IgA <1…    Transglutaminase IgG 9…    DEAMIDATED GLIADIN PEPTIDE IGA <1…    DEAMIDATED GLIADIN PEPTIDE IGG 1…    IgG Cardiolipin Ab 11.4…    IgM Cardiolipin Ab 0.4…    IgA Cardiolipin Ab <0.5…    Beta-2 Glyco 1 IgG 11.3…    Beta 2 Glyco 1 IgM 0.3…    Beta-2 Glyco 1 IgA <0.6…    PTT-LA 33.0…    DRVVT 37.1…    Lupus Anticoagulant Interpretation Neg   ANCA IFA Titer <1:20…    ANCA IFA Pattern None Detected…    UMPA Interpretation No M prot   Rheumatoid Factor      0 - 20 IU/ML 10    Angiotensin 1 Conv 23…    Anti-C1Q Ab, IgG (RDL) <20…    SSA ANTIBODIES >8.0… !    SSB ANTIBODIES <0.2…    HLA-B27 Dna Result NEGATIVE…    Citrulline Antibody, IgG <1…    Centromere Ab <0.2…    Anti-Chromatin <0.2…    NATALEE-1 ANTIBODY <0.2…    RNP Antibody <0.2…    RIBOSOMAL RNP AB <0.2…    SCL-70 ABS EIA <0.2…    SM Antibody <0.2…    KAYLIE,  Broad Spectrum Zeb Serum NEGATIVE Performed at [place] [address] [place] [address]       Previous Tx  Meloxicam- some help  Tizanidine- helps with with neck pain  Methotrexate- helpful but had to discontinue due to N/V  Naproxen- somewhat helpful  Ibuprofen- somewhat helpful  Acetaminophen- no help  Steroids- very helpful but makes her shaky  Norco- very helpful  Plaquenil-allergy agitation/suicidal ideation  Sulfa allergy    Health Maintenance  DXA- none  Malignancy Hx- 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; LN clear  Component      Latest Ref Rng 5/4/2023   Lyme IgG Wblot NEGATIVE…    Lyme IgG Bands p 93…    Lyme IgM Wblot NEGATIVE…    Lyme IgM Bands No Bands Seen    Lyme Interp Negative     Component      Latest Ref Rng 7/18/2023   Hepatitis B Surface AG      NEG  NEGATIVE    Hepatitis C AB NONREACTIVE…    HIV 1/2 Antigen/Antibody Screen with Reflex to Confirmation NONREACTIVE…        Immunization History   Administered Date(s) Administered    Flu vaccine (IIV4), preservative free *Check age/dose* 09/30/2019    Pneumococcal polysaccharide vaccine, 23-valent, age 2 years and older (PNEUMOVAX 23) 10/15/2019    Tdap vaccine, age 7 year and older (BOOSTRIX, ADACEL) 03/07/2011, 10/15/2016          Past Medical History:   Diagnosis Date    Anxiety     Exertional shortness of breath 06/07/2023    Foot swelling 06/07/2023    History of cholecystectomy 01/19/2024    History of gestational diabetes mellitus (GDM) 01/19/2024    Hyperglycemia 01/19/2024    Kidney stones     Lupus     dx 09/2013    Mastodynia 06/07/2023    Other chest pain 01/10/2014    Atypical chest pain    Other conditions influencing health status 04/18/2016    Skin Lesion    Other muscle spasm 02/19/2016    Spasm of cervical paraspinous muscle    Pain in unspecified ankle and joints of unspecified foot 01/27/2015    Ankle joint pain    Pain in unspecified limb 07/29/2016    Limb pain    Pain in unspecified upper arm  2016    Pain in axilla    Pancreatitis (HHS-HCC) 2024    Papillary microcarcinoma of thyroid (Multi) 2023    Personal history of diseases of the skin and subcutaneous tissue 2014    History of urticaria    Personal history of other diseases of the musculoskeletal system and connective tissue 2013    History of low back pain    Personal history of other diseases of the nervous system and sense organs 2016    History of tension headache    Personal history of other diseases of the respiratory system 2014    History of sinusitis    Personal history of other endocrine, nutritional and metabolic disease 2015    History of vitamin D deficiency    Personal history of other endocrine, nutritional and metabolic disease 2016    History of Hashimoto thyroiditis    Personal history of other endocrine, nutritional and metabolic disease 2016    History of hypokalemia    Personal history of other infectious and parasitic diseases 2016    History of herpes zoster    Personal history of other specified conditions 2014    History of urinary frequency    Personal history of other specified conditions 2021    History of abdominal pain    Personal history of other specified conditions 2021    History of nausea    Personal history of urinary (tract) infections 2014    History of urinary tract infection    Right upper quadrant pain 2013    Abdominal pain, RUQ (right upper quadrant)    Sleep disorder 2023    Strain of muscle, fascia and tendon at neck level, initial encounter 2016    Cervical strain    Swelling of both hands 2023    Tachycardia     Thyroid cancer (Multi)       Past Surgical History:   Procedure Laterality Date    BLADDER SUSPENSION  2021    bladder sling     SECTION, CLASSIC  06/27/2013    x3    CHOLECYSTECTOMY  2013    Cholecystectomy Laparoscopic    COLONOSCOPY  2021    GALLBLADDER SURGERY   01/21/2016    Gallbladder Surgery    HYSTERECTOMY  03/01/2022    OTHER SURGICAL HISTORY      GASTRO POLYPS REMOVED    OTHER SURGICAL HISTORY  02/2021    ABLATION    TOTAL THYROIDECTOMY  04/29/2016    Thyroid Surgery Total Thyroidectomy    TUBAL LIGATION  04/10/2013    Tubal Ligation      Current Outpatient Medications   Medication Sig Dispense Refill    Ajovy Autoinjector 225 mg/1.5 mL auto-injector Inject 1 Pen (225 mg) under the skin every 30 (thirty) days.      albuterol 90 mcg/actuation inhaler Inhale 2 puffs every 4 hours if needed for wheezing. 18 g 0    cyanocobalamin (Vitamin B-12) 100 mcg tablet Take 1 tablet (100 mcg) by mouth once daily.      docusate sodium (Colace) 100 mg capsule Take 1 capsule (100 mg) by mouth 2 times a day as needed for constipation (constipation). 60 capsule 5    escitalopram (Lexapro) 20 mg tablet Take 1 tablet (20 mg) by mouth once daily. 90 tablet 0    famotidine (Pepcid) 20 mg tablet Take 1 tablet (20 mg) by mouth 2 times a day. 180 tablet 3    lisinopril 10 mg tablet Take 1 tablet (10 mg) by mouth once daily. 90 tablet 0    melatonin 10 mg capsule Take 1 capsule (10 mg) by mouth once daily at bedtime.      meloxicam (Mobic) 15 mg tablet Take 1 tablet (15 mg) by mouth early in the morning..      methylPREDNISolone (Medrol Dospak) 4 mg tablets Follow schedule on package instructions 21 tablet 1    metoprolol succinate XL (Toprol-XL) 25 mg 24 hr tablet Take 1 tablet (25 mg) by mouth once daily. 90 tablet 3    naloxone (Narcan) 4 mg/0.1 mL nasal spray May repeat in 2-3 minutes if needed, alternating nostrils. 2 each 0    Nurtec ODT 75 mg tablet,disintegrating DISSOLVE 1 TABLET ON THE TONGUE DAILY AS NEEDED      oxyCODONE-acetaminophen (Percocet) 7.5-325 mg tablet Take 1 tablet by mouth every 4 hours if needed for severe pain (7 - 10) or moderate pain (4 - 6). 112 tablet 0    Synthroid 137 mcg tablet Take 1 tablet (137 mcg) by mouth early in the morning.. 30 tablet 11    tiZANidine  "(Zanaflex) 2 mg tablet Take 1 tablet (2 mg) by mouth every 12 hours.      traZODone (Desyrel) 50 mg tablet Take 3 tablets (150 mg) by mouth as needed at bedtime for sleep. 90 tablet 1    [START ON 2/16/2025] ergocalciferol (Vitamin D-2) 1.25 MG (13136 UT) capsule Take 1 capsule (50,000 Units) by mouth 1 (one) time per week. 12 capsule 1    folic acid (Folvite) 1 mg tablet Take one daily except the day of methotrexate. 30 tablet 5    FreeStyle Davy 3 Sensor device Please change sensor every 14 days. 2 each 11    methotrexate 25 mg/mL Infuse 0.5 mL (12.5 mg) over 5 minutes into a venous catheter 1 time for 1 dose. 2 mL 5    promethazine (Phenergan) 25 mg tablet Take 1 tablet (25 mg) by mouth 3 times a day. 90 tablet 3    syringe with needle, safety (Monoject Safety Syringes) 3 mL 25 gauge x 5/8\" syringe 1 each every 7 days. 100 each 3     No current facility-administered medications for this visit.      Allergies   Allergen Reactions    Hydroxychloroquine Other     \"suicidal ideation\"    Amoxicillin Hives and Rash    Humira [Adalimumab] Other     lymphadenopathy    Methotrexate Nausea/vomiting     With pill    Adhesive Rash    Adhesive Tape-Silicones Rash    Caffeine Other and Palpitations    Hydromorphone Nausea/vomiting    Omeprazole Rash    Penicillins Rash    Sulfa (Sulfonamide Antibiotics) Swelling and Rash        Vitals:    02/12/25 1506   BP: 140/80   BP Location: Right arm   Patient Position: Sitting   BP Cuff Size: Adult   Pulse: 78   Resp: 17   Temp: 36 °C (96.8 °F)   TempSrc: Temporal   SpO2: 98%   Weight: 84.4 kg (186 lb)   Height: 1.6 m (5' 3\")           Rapid 3                      Workup  Component      Latest Ref Rng 9/24/2024   GLUCOSE      74 - 99 mg/dL 98    SODIUM      136 - 145 mmol/L 138    POTASSIUM      3.5 - 5.3 mmol/L 3.6    CHLORIDE      98 - 107 mmol/L 104    Bicarbonate      21 - 32 mmol/L 32    Anion Gap      10 - 20 mmol/L <7 (L)    Blood Urea Nitrogen      6 - 23 mg/dL 11  "   Creatinine      0.50 - 1.05 mg/dL 0.74    EGFR      >60 mL/min/1.73m*2 >90    Calcium      8.6 - 10.3 mg/dL 9.1    Albumin      3.4 - 5.0 g/dL 4.1    Alkaline Phosphatase      33 - 110 U/L 37    Total Protein      6.4 - 8.2 g/dL 6.8    AST      9 - 39 U/L 11    Bilirubin Total      0.0 - 1.2 mg/dL 0.2    ALT      7 - 45 U/L 13    Color, Urine      Light-Yellow, Yellow, Dark-Yellow  Light-Yellow    Appearance, Urine      Clear  Clear    Specific Gravity, Urine      1.005 - 1.035  1.022    pH, Urine      5.0, 5.5, 6.0, 6.5, 7.0, 7.5, 8.0  6.5    Protein, Urine      NEGATIVE, 10 (TRACE), 20 (TRACE) mg/dL NEGATIVE    Glucose, Urine      Normal mg/dL Normal    Blood, Urine      NEGATIVE  NEGATIVE    Ketones, Urine      NEGATIVE mg/dL NEGATIVE    Bilirubin, Urine      NEGATIVE  NEGATIVE    Urobilinogen, Urine      Normal mg/dL Normal    Nitrite, Urine      NEGATIVE  NEGATIVE    Leukocyte Esterase, Urine      NEGATIVE  NEGATIVE    Anti-SM      <1.0 AI <0.2    Anti-RNP      <1.0 AI <0.2    Anti-SM/RNP      <1.0 AI <0.2    Anti-SSA      <1.0 AI >8.0 (H)    Anti-SSB      <1.0 AI <0.2    Anti-SCL-70      <1.0 AI <0.2    Anti-NATALEE-1 IgG      <1.0 AI <0.2    Anti-Chromatin      <1.0 AI <0.2    Anti-Centromere      <1.0 AI <0.2    ANTI-RIBOSOMAL P      <1.0 AI <0.2    Anti-DNA (DS)      <5.0 IU/mL 1.0    CHOLESTEROL      0 - 199 mg/dL 235 (H)    HDL CHOLESTEROL      mg/dL 43.5    Cholesterol/HDL Ratio 5.4    LDL Calculated      <=99 mg/dL 141 (H)    VLDL      0 - 40 mg/dL 50 (H)    TRIGLYCERIDES      0 - 149 mg/dL 252 (H)    Non HDL Cholesterol      0 - 149 mg/dL 192 (H)    Albumin, Urine Random      Not established mg/L 10.0    Creatinine, Urine Random      20.0 - 320.0 mg/dL 145.2    Albumin/Creatinine Ratio      <30.0 ug/mg Creat 6.9    MARK      Negative  Positive !    MARK Pattern Speckled    MARK Titer 1:320    Hemoglobin A1C      See comment % 5.2    Estimated Average Glucose      Not Established mg/dL 103    C3 Complement       87 - 200 mg/dL 140    C4 Complement      10 - 50 mg/dL 29    C-Reactive Protein      <1.00 mg/dL 0.21    Creatine Kinase      0 - 215 U/L 40    Scan Result Vectra 27 (low)   Vitamin D, 25-Hydroxy, Total      30 - 100 ng/mL 34    Vit D, 1,25-Dihydroxy      19.9 - 79.3 pg/mL 18.5 (L)    Vitamin B12      211 - 911 pg/mL 234    Thyroid Stimulating Hormone      0.44 - 3.98 mIU/L 15.33 (H)    Extra Tube Hold for add-ons.    Thyroxine, Free      0.61 - 1.12 ng/dL 0.94        Component      Latest Ref Rng 1/31/2025   WHITE BLOOD CELL COUNT      3.8 - 10.8 Thousand/uL 5.6    RED BLOOD CELL COUNT      3.80 - 5.10 Million/uL 3.56 (L)    HEMOGLOBIN      11.7 - 15.5 g/dL 12.1    HEMATOCRIT      35.0 - 45.0 % 36.3    MCV      80.0 - 100.0 fL 102.0 (H)    MCH      27.0 - 33.0 pg 34.0 (H)    MCHC      32.0 - 36.0 g/dL 33.3    RDW      11.0 - 15.0 % 14.4    PLATELET COUNT      140 - 400 Thousand/uL 341    MPV      7.5 - 12.5 fL 9.9    ABSOLUTE NEUTROPHILS      1,500 - 7,800 cells/uL 2,985    ABSOLUTE LYMPHOCYTES      850 - 3,900 cells/uL 2,134    ABSOLUTE MONOCYTES      200 - 950 cells/uL 330    ABSOLUTE EOSINOPHILS      15 - 500 cells/uL 140    ABSOLUTE BASOPHILS      0 - 200 cells/uL 11    NEUTROPHILS      % 53.3    LYMPHOCYTES      % 38.1    MONOCYTES      % 5.9    EOSINOPHILS      % 2.5    BASOPHILS      % 0.2    GLUCOSE      65 - 139 mg/dL 99    UREA NITROGEN (BUN)      7 - 25 mg/dL 11    CREATININE      0.50 - 0.99 mg/dL 0.72    EGFR      > OR = 60 mL/min/1.73m2 108    SODIUM      135 - 146 mmol/L 138    POTASSIUM      3.5 - 5.3 mmol/L 3.8    CHLORIDE      98 - 110 mmol/L 106    CARBON DIOXIDE      20 - 32 mmol/L 23    ELECTROLYTE BALANCE      7 - 17 mmol/L (calc) 9    CALCIUM      8.6 - 10.2 mg/dL 8.8    PROTEIN, TOTAL      6.1 - 8.1 g/dL 6.4    ALBUMIN      3.6 - 5.1 g/dL 3.7    BILIRUBIN, TOTAL      0.2 - 1.2 mg/dL 0.2    ALKALINE PHOSPHATASE      31 - 125 U/L 35    AST      10 - 30 U/L 10    ALT      6 - 29 U/L 11     CREATINE KINASE, TOTAL      20 - 239 U/L 31    SED RATE BY MODIFIED WESTERGREN      < OR = 20 mm/h 14    COMPLEMENT COMPONENT C3C      83 - 193 mg/dL 137    COMPLEMENT COMPONENT C4C      15 - 57 mg/dL 22    C-REACTIVE PROTEIN      <8.0 mg/L <3.0    T3, TOTAL      76 - 181 ng/dL 63 (L)    TSH      mIU/L 22.20 (H)    T3, FREE      2.3 - 4.2 pg/mL 2.4    VITAMIN D,25-OH,TOTAL,IA      30 - 100 ng/mL 23 (L)       Assessment/Plan  1. Hypothyroidism, unspecified type    2. Polyarthritis with negative rheumatoid factor (Multi)    3. Positive MARK (antinuclear antibody)    4. Sjogren syndrome with myopathy    5. Vitamin D deficiency    6. Nausea    7. Other chronic pain    8. Elevated fasting glucose             Orders Placed This Encounter   Procedures    Thyroid Stimulating Hormone    Thyroxine, Free        Since last appt, adherent and tolerating meloxicam.  Had to discontinue methotrexate due to N/V.  Diagnosed with POTS by PCP - no tx  Denies any recent or current infection.  Not on any glucocorticoids.  BP high today, no H/A/CP, feels ok. Will be checking with her PCP. Took her BB today.  Tizanidine helps with neck pain/H/A.  Had hx of lupus but only currently joint aches  Oral sore does not appear SLE  Hx of biopsy showing SCL. She is currently not having the rash, just sun sens and has a red non tender nodules on LUE- stable  Hx of dermatofibroma removed from RLE  Tramadol not much nelp   ROS+ for intermediate sicca sx, sore throat, nasal sore and oral sore chronic- non tender, swollen glands/nodes, GERD, diarrhea and constipation/ hx IBS, sun sens, anemia- not eating well, no bleeding, joint pain, weakness hands, numbness/tingling.  Rapid 3 consistent with moderate severity.  Labs reviewed  D/w pt tx options and decided on   Trial of Humira  Continue meloxicam with plan to eventually wean off as able.  Hydrate well  Pain med  Advised of possible side effects including drug induced lupus sx, infection, and  importance of monitoring.   All questions answered.  Patient to follow up with primary care provider regarding all other medical issues not addressed today and for medical chart updating.         Since last appt, Humira on hold for now.   Pitting edema  L upper LN and due mammogram.  Meloxicam- not cause of swelling  Medrol prn  Pain medicine helpful to function.  Denies any recent or current infection.  Not on any NSAIDs or glucocorticoids.  ROS+ for drenching night sweats without fever, sicca, mucosal sores, swollen glands/nodes, cough, GERD, GI issues, sun sens, joint pain/swelling, numbness/tingling, anxiety.  On  an off painful swelling of legs, ankles and feet. Sometimes hands and fingers.  Rapid 3 consistent with moderate  severity.  Labs reviewed  D/w pt tx options   Humira on hold.  Levothyroxine- advised to take on empty stomach and without other meds. Reassess  Supportive care for LE edema  Advised of possible side effects and importance of monitoring.   All questions answered.  Patient to follow up with primary care provider regarding all other medical issues not addressed today and for medical chart updating.     Flu A and PNA- tx tamiflu and azithromycin  Methotrexate working really well but random nausea and weakness.         Jolly Conteh MD      Patient Care Team:  HAN Aguirre as PCP - General (Family Medicine)  HAN Link as PCP - [facility] Medicaid PCP  HAN Link as PCP - [place] PCP  Jolly Conteh MD as Consulting Physician (Rheumatology)

## 2025-02-14 ENCOUNTER — PATIENT MESSAGE (OUTPATIENT)
Dept: RHEUMATOLOGY | Facility: CLINIC | Age: 40
End: 2025-02-14
Payer: COMMERCIAL

## 2025-02-26 ENCOUNTER — TELEPHONE (OUTPATIENT)
Facility: CLINIC | Age: 40
End: 2025-02-26
Payer: COMMERCIAL

## 2025-02-26 DIAGNOSIS — M06.09 POLYARTHRITIS WITH NEGATIVE RHEUMATOID FACTOR (MULTI): ICD-10-CM

## 2025-02-26 DIAGNOSIS — G89.29 OTHER CHRONIC PAIN: ICD-10-CM

## 2025-02-27 ENCOUNTER — TELEPHONE (OUTPATIENT)
Dept: RHEUMATOLOGY | Facility: CLINIC | Age: 40
End: 2025-02-27
Payer: COMMERCIAL

## 2025-02-27 NOTE — TELEPHONE ENCOUNTER
PT SOHA DHILLON, STATES AT LAST VISIT YOU SAID YOU WERE ORDERING methotrexate FOR SUB Q INJECTION. WHEN SHE PICKED UP Rx IT SAYS TO INFUSE 0.5 MG EVERY 5 MINUTES VIA IV FOR A 1 TIME DOSE. HOW IS SHE TO TAKE methotrexate?

## 2025-02-28 RX ORDER — OXYCODONE AND ACETAMINOPHEN 7.5; 325 MG/1; MG/1
1 TABLET ORAL EVERY 4 HOURS PRN
Qty: 112 TABLET | Refills: 0 | Status: SHIPPED | OUTPATIENT
Start: 2025-02-28

## 2025-03-03 ENCOUNTER — APPOINTMENT (OUTPATIENT)
Dept: PRIMARY CARE | Facility: CLINIC | Age: 40
End: 2025-03-03
Payer: COMMERCIAL

## 2025-03-03 VITALS
WEIGHT: 184 LBS | HEART RATE: 86 BPM | HEIGHT: 63 IN | SYSTOLIC BLOOD PRESSURE: 113 MMHG | OXYGEN SATURATION: 94 % | DIASTOLIC BLOOD PRESSURE: 62 MMHG | BODY MASS INDEX: 32.6 KG/M2

## 2025-03-03 DIAGNOSIS — F41.9 ANXIETY: ICD-10-CM

## 2025-03-03 DIAGNOSIS — E89.0 HYPOTHYROIDISM, POSTSURGICAL: Primary | ICD-10-CM

## 2025-03-03 DIAGNOSIS — R19.7 DIARRHEA, UNSPECIFIED TYPE: ICD-10-CM

## 2025-03-03 PROCEDURE — 3008F BODY MASS INDEX DOCD: CPT | Performed by: NURSE PRACTITIONER

## 2025-03-03 PROCEDURE — 99213 OFFICE O/P EST LOW 20 MIN: CPT | Performed by: NURSE PRACTITIONER

## 2025-03-03 ASSESSMENT — PATIENT HEALTH QUESTIONNAIRE - PHQ9
SUM OF ALL RESPONSES TO PHQ9 QUESTIONS 1 AND 2: 0
1. LITTLE INTEREST OR PLEASURE IN DOING THINGS: NOT AT ALL
2. FEELING DOWN, DEPRESSED OR HOPELESS: NOT AT ALL

## 2025-03-03 NOTE — PROGRESS NOTES
"Subjective   Patient ID: Zuleyma Farrell is a 39 y.o. female who presents for Follow-up (Patient is here today to transition to Debra . Patient would like to discuss her anxiety medication).    39 year old female here for review of anxiety meds.  Recently saw rheum  Having loose stools causing  weight loss.   Was having 3-4 stools a day. This is improving.   Anxiety high. On lexapro. Wonders if r/t the thyroid  Hypothyroid- need updated labs  POTS- while having diarrhea was getting very dizzy, lightheaded.          Review of Systems    Objective   /62   Pulse 86   Ht 1.6 m (5' 3\")   Wt 83.5 kg (184 lb)   SpO2 94%   BMI 32.59 kg/m²     Physical Exam  Constitutional:       Appearance: Normal appearance. She is obese.   Cardiovascular:      Rate and Rhythm: Normal rate and regular rhythm.      Pulses: Normal pulses.      Heart sounds: Normal heart sounds.   Pulmonary:      Effort: Pulmonary effort is normal.      Breath sounds: Normal breath sounds.   Neurological:      General: No focal deficit present.      Mental Status: She is alert and oriented to person, place, and time.   Psychiatric:         Mood and Affect: Mood normal.         Behavior: Behavior normal.         Thought Content: Thought content normal.         Assessment/Plan   Diagnoses and all orders for this visit:  Hypothyroidism, postsurgical  Comments:  update labs  Orders:  -     Thyroid Stimulating Hormone; Future  -     Thyroxine, Free; Future  Anxiety  Diarrhea, unspecified type  Comments:  i think viral       If TSH is good then I will add wellbutrin xr 150 mg a day.   "

## 2025-03-05 PROBLEM — N93.9 ABNORMAL UTERINE BLEEDING: Status: ACTIVE | Noted: 2025-03-05

## 2025-03-05 PROBLEM — N39.46 MIXED STRESS AND URGE URINARY INCONTINENCE: Status: ACTIVE | Noted: 2025-03-05

## 2025-03-05 PROBLEM — N92.1 MENORRHAGIA WITH IRREGULAR CYCLE: Status: RESOLVED | Noted: 2024-01-19 | Resolved: 2025-03-05

## 2025-03-05 LAB
T4 FREE SERPL-MCNC: 1.6 NG/DL (ref 0.8–1.8)
TSH SERPL-ACNC: 1.94 MIU/L

## 2025-03-05 RX ORDER — METHOTREXATE 25 MG/ML
INJECTION INTRA-ARTERIAL; INTRAMUSCULAR; INTRATHECAL; INTRAVENOUS
COMMUNITY
Start: 2025-02-17

## 2025-03-05 RX ORDER — CITALOPRAM 20 MG/1
TABLET, FILM COATED ORAL
COMMUNITY

## 2025-03-07 DIAGNOSIS — G47.9 SLEEPING DIFFICULTIES: ICD-10-CM

## 2025-03-07 DIAGNOSIS — Z87.898 HX OF ABDOMINAL PAIN: ICD-10-CM

## 2025-03-07 RX ORDER — FAMOTIDINE 20 MG/1
20 TABLET, FILM COATED ORAL 2 TIMES DAILY
Qty: 180 TABLET | Refills: 3 | Status: SHIPPED | OUTPATIENT
Start: 2025-03-07

## 2025-03-07 RX ORDER — TRAZODONE HYDROCHLORIDE 50 MG/1
150 TABLET ORAL NIGHTLY PRN
Qty: 90 TABLET | Refills: 1 | Status: SHIPPED | OUTPATIENT
Start: 2025-03-07

## 2025-03-07 NOTE — PATIENT COMMUNICATION
Per NIH recommendations- see website  Lifestyle changes can also help people with POTS minimize symptoms.   She would need referral if we want to find out/diagnose it.     Hydration, electrolytes, compression stockings.    Increasing salt intake and staying hydrated can help regulate blood pressure and increase the volume of blood in the body.   Drinking at least 16 ounces (about two glasses) of water before getting up can provide a temporary boost.   For some individuals, beta-blockers may offer relief. Beta-blockers are a type of medication that slows down the heart rate and reduces blood pressure. Beta-blockers can reduce the symptoms of dizziness, lightheadedness, and rapid heartbeat that often occur when people with POTS stand up. They can also help improve blood flow to the brain, which can help to relieve other symptoms of POTS, such as brain fog, headaches, tremors, and anxiety.   Research suggests that cardiovascular rehabilitation with physical activity that slowly builds in intensity can also improve tolerance to standing. However, because some people with POTS also have exercise intolerance, this is not recommended for everyone.   Wearing special compression garments can help. These garments, which are tight-fitting and go up to the waist, squeeze the lower body. This can help improve blood flow and reduce heart rate for people with POTS, especially when standing up.

## 2025-03-09 RX ORDER — TRAZODONE HYDROCHLORIDE 50 MG/1
150 TABLET ORAL NIGHTLY PRN
Qty: 90 TABLET | Refills: 1 | Status: SHIPPED | OUTPATIENT
Start: 2025-03-09

## 2025-03-09 RX ORDER — FAMOTIDINE 20 MG/1
20 TABLET, FILM COATED ORAL 2 TIMES DAILY
Qty: 180 TABLET | Refills: 3 | Status: SHIPPED | OUTPATIENT
Start: 2025-03-09

## 2025-03-11 ENCOUNTER — APPOINTMENT (OUTPATIENT)
Dept: SURGERY | Facility: CLINIC | Age: 40
End: 2025-03-11
Payer: COMMERCIAL

## 2025-03-12 ENCOUNTER — HOSPITAL ENCOUNTER (EMERGENCY)
Facility: HOSPITAL | Age: 40
Discharge: HOME | End: 2025-03-12
Attending: STUDENT IN AN ORGANIZED HEALTH CARE EDUCATION/TRAINING PROGRAM
Payer: COMMERCIAL

## 2025-03-12 VITALS
HEIGHT: 63 IN | TEMPERATURE: 97.2 F | BODY MASS INDEX: 32.6 KG/M2 | OXYGEN SATURATION: 95 % | DIASTOLIC BLOOD PRESSURE: 92 MMHG | SYSTOLIC BLOOD PRESSURE: 160 MMHG | WEIGHT: 184 LBS | HEART RATE: 76 BPM | RESPIRATION RATE: 17 BRPM

## 2025-03-12 DIAGNOSIS — E87.6 HYPOKALEMIA: ICD-10-CM

## 2025-03-12 DIAGNOSIS — M62.838 MUSCLE SPASM: Primary | ICD-10-CM

## 2025-03-12 LAB
ALBUMIN SERPL BCP-MCNC: 3.9 G/DL (ref 3.4–5)
ALP SERPL-CCNC: 37 U/L (ref 33–110)
ALT SERPL W P-5'-P-CCNC: 10 U/L (ref 7–45)
ANION GAP SERPL CALCULATED.3IONS-SCNC: 12 MMOL/L (ref 10–20)
APPEARANCE UR: CLEAR
AST SERPL W P-5'-P-CCNC: 10 U/L (ref 9–39)
BACTERIA #/AREA URNS AUTO: ABNORMAL /HPF
BASOPHILS # BLD AUTO: 0.02 X10*3/UL (ref 0–0.1)
BASOPHILS NFR BLD AUTO: 0.3 %
BILIRUB SERPL-MCNC: 0.3 MG/DL (ref 0–1.2)
BILIRUB UR STRIP.AUTO-MCNC: NEGATIVE MG/DL
BUN SERPL-MCNC: 15 MG/DL (ref 6–23)
CALCIUM SERPL-MCNC: 8.7 MG/DL (ref 8.6–10.3)
CHLORIDE SERPL-SCNC: 103 MMOL/L (ref 98–107)
CO2 SERPL-SCNC: 27 MMOL/L (ref 21–32)
COLOR UR: YELLOW
CREAT SERPL-MCNC: 0.71 MG/DL (ref 0.5–1.05)
EGFRCR SERPLBLD CKD-EPI 2021: >90 ML/MIN/1.73M*2
EOSINOPHIL # BLD AUTO: 0.12 X10*3/UL (ref 0–0.7)
EOSINOPHIL NFR BLD AUTO: 1.6 %
ERYTHROCYTE [DISTWIDTH] IN BLOOD BY AUTOMATED COUNT: 14.2 % (ref 11.5–14.5)
GLUCOSE SERPL-MCNC: 93 MG/DL (ref 74–99)
GLUCOSE UR STRIP.AUTO-MCNC: NORMAL MG/DL
HCG UR QL IA.RAPID: NEGATIVE
HCT VFR BLD AUTO: 31.6 % (ref 36–46)
HGB BLD-MCNC: 10.8 G/DL (ref 12–16)
IMM GRANULOCYTES # BLD AUTO: 0.02 X10*3/UL (ref 0–0.7)
IMM GRANULOCYTES NFR BLD AUTO: 0.3 % (ref 0–0.9)
KETONES UR STRIP.AUTO-MCNC: NEGATIVE MG/DL
LEUKOCYTE ESTERASE UR QL STRIP.AUTO: NEGATIVE
LYMPHOCYTES # BLD AUTO: 1.5 X10*3/UL (ref 1.2–4.8)
LYMPHOCYTES NFR BLD AUTO: 19.9 %
MAGNESIUM SERPL-MCNC: 1.74 MG/DL (ref 1.6–2.4)
MCH RBC QN AUTO: 33.8 PG (ref 26–34)
MCHC RBC AUTO-ENTMCNC: 34.2 G/DL (ref 32–36)
MCV RBC AUTO: 99 FL (ref 80–100)
MONOCYTES # BLD AUTO: 0.37 X10*3/UL (ref 0.1–1)
MONOCYTES NFR BLD AUTO: 4.9 %
MUCOUS THREADS #/AREA URNS AUTO: ABNORMAL /LPF
NEUTROPHILS # BLD AUTO: 5.51 X10*3/UL (ref 1.2–7.7)
NEUTROPHILS NFR BLD AUTO: 73 %
NITRITE UR QL STRIP.AUTO: NEGATIVE
NRBC BLD-RTO: 0 /100 WBCS (ref 0–0)
PH UR STRIP.AUTO: 6.5 [PH]
PLATELET # BLD AUTO: 333 X10*3/UL (ref 150–450)
POTASSIUM SERPL-SCNC: 2.8 MMOL/L (ref 3.5–5.3)
PROT SERPL-MCNC: 6.4 G/DL (ref 6.4–8.2)
PROT UR STRIP.AUTO-MCNC: NORMAL MG/DL
RBC # BLD AUTO: 3.2 X10*6/UL (ref 4–5.2)
RBC # UR STRIP.AUTO: NEGATIVE MG/DL
RBC #/AREA URNS AUTO: ABNORMAL /HPF
SODIUM SERPL-SCNC: 139 MMOL/L (ref 136–145)
SP GR UR STRIP.AUTO: 1.03
SQUAMOUS #/AREA URNS AUTO: ABNORMAL /HPF
UROBILINOGEN UR STRIP.AUTO-MCNC: NORMAL MG/DL
WBC # BLD AUTO: 7.5 X10*3/UL (ref 4.4–11.3)
WBC #/AREA URNS AUTO: ABNORMAL /HPF

## 2025-03-12 PROCEDURE — 81001 URINALYSIS AUTO W/SCOPE: CPT | Performed by: STUDENT IN AN ORGANIZED HEALTH CARE EDUCATION/TRAINING PROGRAM

## 2025-03-12 PROCEDURE — 80053 COMPREHEN METABOLIC PANEL: CPT | Performed by: STUDENT IN AN ORGANIZED HEALTH CARE EDUCATION/TRAINING PROGRAM

## 2025-03-12 PROCEDURE — 2500000004 HC RX 250 GENERAL PHARMACY W/ HCPCS (ALT 636 FOR OP/ED): Performed by: STUDENT IN AN ORGANIZED HEALTH CARE EDUCATION/TRAINING PROGRAM

## 2025-03-12 PROCEDURE — 85025 COMPLETE CBC W/AUTO DIFF WBC: CPT | Performed by: STUDENT IN AN ORGANIZED HEALTH CARE EDUCATION/TRAINING PROGRAM

## 2025-03-12 PROCEDURE — 96375 TX/PRO/DX INJ NEW DRUG ADDON: CPT

## 2025-03-12 PROCEDURE — 2500000002 HC RX 250 W HCPCS SELF ADMINISTERED DRUGS (ALT 637 FOR MEDICARE OP, ALT 636 FOR OP/ED): Performed by: STUDENT IN AN ORGANIZED HEALTH CARE EDUCATION/TRAINING PROGRAM

## 2025-03-12 PROCEDURE — 36415 COLL VENOUS BLD VENIPUNCTURE: CPT | Performed by: STUDENT IN AN ORGANIZED HEALTH CARE EDUCATION/TRAINING PROGRAM

## 2025-03-12 PROCEDURE — 99284 EMERGENCY DEPT VISIT MOD MDM: CPT | Mod: 25 | Performed by: STUDENT IN AN ORGANIZED HEALTH CARE EDUCATION/TRAINING PROGRAM

## 2025-03-12 PROCEDURE — 83735 ASSAY OF MAGNESIUM: CPT | Performed by: STUDENT IN AN ORGANIZED HEALTH CARE EDUCATION/TRAINING PROGRAM

## 2025-03-12 PROCEDURE — 81025 URINE PREGNANCY TEST: CPT | Performed by: STUDENT IN AN ORGANIZED HEALTH CARE EDUCATION/TRAINING PROGRAM

## 2025-03-12 PROCEDURE — 96365 THER/PROPH/DIAG IV INF INIT: CPT

## 2025-03-12 RX ORDER — POTASSIUM CHLORIDE 14.9 MG/ML
20 INJECTION INTRAVENOUS ONCE
Status: COMPLETED | OUTPATIENT
Start: 2025-03-12 | End: 2025-03-12

## 2025-03-12 RX ORDER — DIPHENHYDRAMINE HYDROCHLORIDE 50 MG/ML
50 INJECTION, SOLUTION INTRAMUSCULAR; INTRAVENOUS ONCE
Status: COMPLETED | OUTPATIENT
Start: 2025-03-12 | End: 2025-03-12

## 2025-03-12 RX ORDER — POTASSIUM CHLORIDE 750 MG/1
10 TABLET, FILM COATED, EXTENDED RELEASE ORAL DAILY
Qty: 5 TABLET | Refills: 0 | Status: SHIPPED | OUTPATIENT
Start: 2025-03-12 | End: 2025-03-17

## 2025-03-12 RX ORDER — POTASSIUM CHLORIDE 20 MEQ/1
40 TABLET, EXTENDED RELEASE ORAL ONCE
Status: COMPLETED | OUTPATIENT
Start: 2025-03-12 | End: 2025-03-12

## 2025-03-12 RX ADMIN — POTASSIUM CHLORIDE 20 MEQ: 14.9 INJECTION, SOLUTION INTRAVENOUS at 12:22

## 2025-03-12 RX ADMIN — DIPHENHYDRAMINE HYDROCHLORIDE 50 MG: 50 INJECTION, SOLUTION INTRAMUSCULAR; INTRAVENOUS at 10:41

## 2025-03-12 RX ADMIN — POTASSIUM CHLORIDE 40 MEQ: 1500 TABLET, EXTENDED RELEASE ORAL at 12:22

## 2025-03-12 ASSESSMENT — PAIN SCALES - GENERAL
PAINLEVEL_OUTOF10: 0 - NO PAIN
PAINLEVEL_OUTOF10: 0 - NO PAIN

## 2025-03-12 ASSESSMENT — COLUMBIA-SUICIDE SEVERITY RATING SCALE - C-SSRS
1. IN THE PAST MONTH, HAVE YOU WISHED YOU WERE DEAD OR WISHED YOU COULD GO TO SLEEP AND NOT WAKE UP?: NO
6. HAVE YOU EVER DONE ANYTHING, STARTED TO DO ANYTHING, OR PREPARED TO DO ANYTHING TO END YOUR LIFE?: NO
2. HAVE YOU ACTUALLY HAD ANY THOUGHTS OF KILLING YOURSELF?: NO

## 2025-03-12 ASSESSMENT — PAIN - FUNCTIONAL ASSESSMENT
PAIN_FUNCTIONAL_ASSESSMENT: 0-10
PAIN_FUNCTIONAL_ASSESSMENT: 0-10

## 2025-03-12 NOTE — ED PROVIDER NOTES
Take HPI   Chief Complaint   Patient presents with    Tremors     Pt states she started taking welbutrin 2 days ago.  Started having tremors about 2 hours ago.       Patient is a 39-year-old female that presents emergency department for evaluation of involuntary tremors.  Patient states that this morning she woke up and when she got up out of bed she felt unsteady and started having involuntary muscle spasms of the legs and arms especially worse with exertion.  She patient states she is having a difficult time walking due to this.  She denies feeling lightheaded, chest pain, shortness of breath, nausea, vomiting, difficulty breathing, fevers or chills.  Patient does note that she started taking Wellbutrin 2 days ago and is concerned this may be the source of her tremors.      History provided by:  Patient          Patient History   Past Medical History:   Diagnosis Date    Anxiety     Exertional shortness of breath 06/07/2023    Foot swelling 06/07/2023    History of cholecystectomy 01/19/2024    History of gestational diabetes mellitus (GDM) 01/19/2024    Hyperglycemia 01/19/2024    Kidney stones     Lupus     dx 09/2013    Mastodynia 06/07/2023    Other chest pain 01/10/2014    Atypical chest pain    Other conditions influencing health status 04/18/2016    Skin Lesion    Other muscle spasm 02/19/2016    Spasm of cervical paraspinous muscle    Pain in unspecified ankle and joints of unspecified foot 01/27/2015    Ankle joint pain    Pain in unspecified limb 07/29/2016    Limb pain    Pain in unspecified upper arm 07/29/2016    Pain in axilla    Pancreatitis ([facility]HS-HCC) 01/19/2024    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    Personal history of diseases of the skin and subcutaneous tissue 02/20/2014    History of urticaria    Personal history of other diseases of the musculoskeletal system and connective tissue 12/31/2013    History of low back pain    Personal history of other diseases of the nervous  system and sense organs 2016    History of tension headache    Personal history of other diseases of the respiratory system 2014    History of sinusitis    Personal history of other endocrine, nutritional and metabolic disease 2015    History of vitamin D deficiency    Personal history of other endocrine, nutritional and metabolic disease 2016    History of Hashimoto thyroiditis    Personal history of other endocrine, nutritional and metabolic disease 2016    History of hypokalemia    Personal history of other infectious and parasitic diseases 2016    History of herpes zoster    Personal history of other specified conditions 2014    History of urinary frequency    Personal history of other specified conditions 2021    History of abdominal pain    Personal history of other specified conditions 2021    History of nausea    Personal history of urinary (tract) infections 2014    History of urinary tract infection    Right upper quadrant pain 2013    Abdominal pain, RUQ (right upper quadrant)    Sleep disorder 2023    Strain of muscle, fascia and tendon at neck level, initial encounter 2016    Cervical strain    Swelling of both hands 2023    Tachycardia     Thyroid cancer (Multi)      Past Surgical History:   Procedure Laterality Date    BLADDER SUSPENSION  2021    bladder sling     SECTION, CLASSIC  06/27/2013    x3    CHOLECYSTECTOMY  2013    Cholecystectomy Laparoscopic    COLONOSCOPY  2021    GALLBLADDER SURGERY  2016    Gallbladder Surgery    HYSTERECTOMY  2022    OTHER SURGICAL HISTORY      GASTRO POLYPS REMOVED    OTHER SURGICAL HISTORY  2021    ABLATION    TOTAL THYROIDECTOMY  2016    Thyroid Surgery Total Thyroidectomy    TUBAL LIGATION  04/10/2013    Tubal Ligation     Family History   Problem Relation Name Age of Onset    Other (Other) Mother          Lichen sclerosus    Rheum arthritis  Mother      Hypertension Mother       Social History     Tobacco Use    Smoking status: Every Day     Types: Cigarettes     Passive exposure: Current    Smokeless tobacco: Never   Vaping Use    Vaping status: Former    Passive vaping exposure: Yes   Substance Use Topics    Alcohol use: Not Currently    Drug use: Never       Physical Exam   ED Triage Vitals [03/12/25 0910]   Temperature Heart Rate Respirations BP   36.2 °C (97.2 °F) 94 18 (!) 163/91      Pulse Ox Temp src Heart Rate Source Patient Position   98 % -- -- --      BP Location FiO2 (%)     -- --       Physical Exam  Vitals and nursing note reviewed.   Constitutional:       General: She is not in acute distress.     Appearance: Normal appearance. She is not ill-appearing.   HENT:      Head: Normocephalic and atraumatic.      Nose: Nose normal.      Mouth/Throat:      Mouth: Mucous membranes are moist.   Eyes:      Extraocular Movements: Extraocular movements intact.      Pupils: Pupils are equal, round, and reactive to light.   Cardiovascular:      Rate and Rhythm: Normal rate and regular rhythm.      Pulses: Normal pulses.   Pulmonary:      Effort: Pulmonary effort is normal. No respiratory distress.      Breath sounds: Normal breath sounds. No wheezing or rhonchi.   Abdominal:      General: Abdomen is flat.   Musculoskeletal:      Cervical back: Normal range of motion and neck supple. No rigidity or tenderness.   Skin:     General: Skin is warm and dry.   Neurological:      Mental Status: She is alert and oriented to person, place, and time.      Cranial Nerves: No cranial nerve deficit.      Sensory: No sensory deficit.      Motor: No weakness.      Coordination: Coordination normal.      Comments: There are involuntary spasms and movements of the upper extremities as well as the lower extremities especially worse on exertion however not as noticeable at rest.           ED Course & MDM   Diagnoses as of 03/12/25 1241   Muscle spasm   Hypokalemia                  No data recorded     [place] Coma Scale Score: 15 (03/12/25 0910 : David Todd, EMT)                           Medical Decision Making  Patient is a 39-year-old female that presents emergency department for evaluation of tremors.  She does have involuntary movements of the upper extremities and lower extremities especially worse when using the extremities however not as noticeable at rest.  History exam is most consistent with dystonic reaction.  Given the reported tremors blood work is ordered including CBC, CMP along with urinalysis, urine pregnancy test.  She was given IV Benadryl initially for the dystonic reaction with resolution of the dystonic movements on reevaluation.  She was also found to be hypokalemic with a potassium of 2.8 and was given 40 mill equivalents of oral potassium and ordered 20 mill equivalents of potassium IV.  Patient unable to tolerate the IV potassium infusion as it was causing her too much discomfort.  As she is asymptomatic at this time and did already receive 40 mill equivalents of potassium patient will be given a prescription for 10 mEq of potassium daily for the next 5 days.  She is to follow-up with her primary care physician in 1 week for reevaluation and repeat testing of her potassium.  Patient will also continue to use Benadryl every 6 hours for the next 2 days to help with the dystonic reactions and I did discuss the importance of following up with her primary care physician for further discussion regarding her Wellbutrin as I do feel this is the source of her symptoms.  Return precautions were discussed with patient.        Procedure  Procedures     Melvin Pink,   03/14/25 1520

## 2025-03-12 NOTE — DISCHARGE INSTRUCTIONS
Take Benadryl every 6 hours while awake for the next 2 days.  Next dose of Benadryl should be taken at for all 5 PM.  If symptoms worsen or change he should return for further evaluation and treatment.  Follow-up with your primary care physician for further management of your Wellbutrin as well as for repeat testing for your potassium levels.

## 2025-03-17 ENCOUNTER — TELEPHONE (OUTPATIENT)
Dept: PRIMARY CARE | Facility: CLINIC | Age: 40
End: 2025-03-17
Payer: COMMERCIAL

## 2025-03-17 DIAGNOSIS — F41.9 ANXIETY: ICD-10-CM

## 2025-03-17 DIAGNOSIS — E87.6 HYPOKALEMIA: Primary | ICD-10-CM

## 2025-03-18 DIAGNOSIS — M06.09 POLYARTHRITIS WITH NEGATIVE RHEUMATOID FACTOR (MULTI): ICD-10-CM

## 2025-03-18 DIAGNOSIS — G89.29 OTHER CHRONIC PAIN: ICD-10-CM

## 2025-03-18 RX ORDER — OXYCODONE AND ACETAMINOPHEN 7.5; 325 MG/1; MG/1
1 TABLET ORAL EVERY 4 HOURS PRN
Qty: 112 TABLET | Refills: 0 | Status: SHIPPED | OUTPATIENT
Start: 2025-03-18

## 2025-04-04 ENCOUNTER — PATIENT MESSAGE (OUTPATIENT)
Dept: RHEUMATOLOGY | Facility: CLINIC | Age: 40
End: 2025-04-04
Payer: COMMERCIAL

## 2025-04-04 DIAGNOSIS — G89.29 OTHER CHRONIC PAIN: ICD-10-CM

## 2025-04-04 DIAGNOSIS — M06.09 POLYARTHRITIS WITH NEGATIVE RHEUMATOID FACTOR (MULTI): ICD-10-CM

## 2025-04-04 RX ORDER — OXYCODONE AND ACETAMINOPHEN 7.5; 325 MG/1; MG/1
1 TABLET ORAL EVERY 4 HOURS PRN
Qty: 112 TABLET | Refills: 0 | Status: SHIPPED | OUTPATIENT
Start: 2025-04-05

## 2025-04-07 ENCOUNTER — APPOINTMENT (OUTPATIENT)
Dept: PRIMARY CARE | Facility: CLINIC | Age: 40
End: 2025-04-07
Payer: COMMERCIAL

## 2025-04-10 DIAGNOSIS — F41.9 ANXIETY: ICD-10-CM

## 2025-04-14 ENCOUNTER — TELEPHONE (OUTPATIENT)
Dept: PRIMARY CARE | Facility: CLINIC | Age: 40
End: 2025-04-14

## 2025-04-14 ENCOUNTER — APPOINTMENT (OUTPATIENT)
Dept: ENDOCRINOLOGY | Facility: CLINIC | Age: 40
End: 2025-04-14
Payer: COMMERCIAL

## 2025-04-14 NOTE — TELEPHONE ENCOUNTER
Patient called in requesting a refill of Lexapro 20mg be sent to [facility] on file. Last office visit: 3/3/2025  Next office visit: 9/4/2025

## 2025-04-15 DIAGNOSIS — F41.9 ANXIETY: ICD-10-CM

## 2025-04-15 RX ORDER — ESCITALOPRAM OXALATE 20 MG/1
20 TABLET ORAL DAILY
Qty: 90 TABLET | Refills: 1 | OUTPATIENT
Start: 2025-04-15

## 2025-04-15 RX ORDER — ESCITALOPRAM OXALATE 20 MG/1
20 TABLET ORAL DAILY
Qty: 90 TABLET | Refills: 1 | Status: SHIPPED | OUTPATIENT
Start: 2025-04-15

## 2025-04-18 ENCOUNTER — PATIENT MESSAGE (OUTPATIENT)
Dept: RHEUMATOLOGY | Facility: CLINIC | Age: 40
End: 2025-04-18
Payer: COMMERCIAL

## 2025-04-18 ENCOUNTER — TELEPHONE (OUTPATIENT)
Dept: PRIMARY CARE | Facility: CLINIC | Age: 40
End: 2025-04-18
Payer: COMMERCIAL

## 2025-04-18 DIAGNOSIS — G89.29 OTHER CHRONIC PAIN: ICD-10-CM

## 2025-04-18 DIAGNOSIS — M06.09 POLYARTHRITIS WITH NEGATIVE RHEUMATOID FACTOR (MULTI): ICD-10-CM

## 2025-04-20 RX ORDER — OXYCODONE AND ACETAMINOPHEN 7.5; 325 MG/1; MG/1
1 TABLET ORAL EVERY 4 HOURS PRN
Qty: 112 TABLET | Refills: 0 | Status: SHIPPED | OUTPATIENT
Start: 2025-04-22

## 2025-04-21 DIAGNOSIS — I10 PRIMARY HYPERTENSION: ICD-10-CM

## 2025-04-21 RX ORDER — LISINOPRIL 10 MG/1
10 TABLET ORAL DAILY
Qty: 90 TABLET | Refills: 0 | Status: SHIPPED | OUTPATIENT
Start: 2025-04-21

## 2025-04-28 ENCOUNTER — APPOINTMENT (OUTPATIENT)
Dept: BEHAVIORAL HEALTH | Facility: CLINIC | Age: 40
End: 2025-04-28
Payer: COMMERCIAL

## 2025-04-29 ENCOUNTER — TELEPHONE (OUTPATIENT)
Dept: PRIMARY CARE | Facility: CLINIC | Age: 40
End: 2025-04-29
Payer: COMMERCIAL

## 2025-04-29 DIAGNOSIS — E89.0 HYPOTHYROIDISM, POSTSURGICAL: ICD-10-CM

## 2025-04-29 RX ORDER — LEVOTHYROXINE SODIUM 137 UG/1
137 TABLET ORAL DAILY
Qty: 30 TABLET | Refills: 11 | Status: SHIPPED | OUTPATIENT
Start: 2025-04-29 | End: 2026-04-29

## 2025-05-05 ENCOUNTER — TELEPHONE (OUTPATIENT)
Dept: PRIMARY CARE | Facility: CLINIC | Age: 40
End: 2025-05-05
Payer: COMMERCIAL

## 2025-05-05 NOTE — TELEPHONE ENCOUNTER
Pt would like refill of promethazine sent to [id number] - [place], [place] - [address] AT [facility] [address]     Pt said Debra has filled this for her in the past    LOV 3/3 25  NOV 9/4/25

## 2025-05-06 DIAGNOSIS — R11.0 NAUSEA: ICD-10-CM

## 2025-05-06 RX ORDER — PROMETHAZINE HYDROCHLORIDE 25 MG/1
25 TABLET ORAL 3 TIMES DAILY
Qty: 90 TABLET | Refills: 3 | Status: SHIPPED | OUTPATIENT
Start: 2025-05-06

## 2025-05-09 ENCOUNTER — PATIENT MESSAGE (OUTPATIENT)
Dept: RHEUMATOLOGY | Facility: CLINIC | Age: 40
End: 2025-05-09
Payer: COMMERCIAL

## 2025-05-09 DIAGNOSIS — G89.29 OTHER CHRONIC PAIN: ICD-10-CM

## 2025-05-09 DIAGNOSIS — M06.09 POLYARTHRITIS WITH NEGATIVE RHEUMATOID FACTOR (MULTI): ICD-10-CM

## 2025-05-09 RX ORDER — OXYCODONE AND ACETAMINOPHEN 7.5; 325 MG/1; MG/1
1 TABLET ORAL EVERY 4 HOURS PRN
Qty: 112 TABLET | Refills: 0 | Status: SHIPPED | OUTPATIENT
Start: 2025-05-09

## 2025-05-22 ENCOUNTER — PATIENT MESSAGE (OUTPATIENT)
Dept: RHEUMATOLOGY | Facility: CLINIC | Age: 40
End: 2025-05-22
Payer: COMMERCIAL

## 2025-05-22 DIAGNOSIS — M06.09 POLYARTHRITIS WITH NEGATIVE RHEUMATOID FACTOR (MULTI): ICD-10-CM

## 2025-05-22 DIAGNOSIS — G89.29 OTHER CHRONIC PAIN: ICD-10-CM

## 2025-05-22 RX ORDER — OXYCODONE AND ACETAMINOPHEN 7.5; 325 MG/1; MG/1
1 TABLET ORAL EVERY 4 HOURS PRN
Qty: 112 TABLET | Refills: 0 | Status: SHIPPED | OUTPATIENT
Start: 2025-05-26

## 2025-05-22 NOTE — PROGRESS NOTES
Zuleyma Farrell is a 40 y.o. female pt of [facility] who presents today for an acute visit    No chief complaint on file.      Symptoms: hot and cold sensation and tingling in hands x 1 week         Allergies  RX Allergies[1]    Review of Systems  ROS was completed and all systems are negative with the exception of what was noted in the the HPI.       Objective     Most Recent Vitals:  There were no vitals taken for this visit.      Current Medications  Current Outpatient Medications   Medication Instructions    Ajovy Autoinjector 225 mg, Every 30 days    albuterol 90 mcg/actuation inhaler 2 puffs, inhalation, Every 4 hours PRN    buPROPion XL (WELLBUTRIN XL) 150 mg, oral, Every morning, Do not crush, chew, or split.    cyanocobalamin (VITAMIN B-12) 100 mcg, Daily    docusate sodium (COLACE) 100 mg, oral, 2 times daily PRN    escitalopram (LEXAPRO) 20 mg, oral, Daily    famotidine (PEPCID) 20 mg, oral, 2 times daily    famotidine (PEPCID) 20 mg, oral, 2 times daily    folic acid (Folvite) 1 mg tablet Take one daily except the day of methotrexate.    FreeStyle Davy 3 Sensor device Please change sensor every 14 days.    levothyroxine (SYNTHROID) 137 mcg, oral, Daily    lisinopril 10 mg, oral, Daily    melatonin 10 mg capsule 1 capsule, Nightly    meloxicam (Mobic) 15 mg tablet 1 tablet, Daily (0630)    methotrexate 25 mg/mL INFUSE 0.5 ML EVERY 5 MINUTES INTO A VENOUS CATHETER 1 TIME FOR 1 DOSE    methylPREDNISolone (Medrol Dospak) 4 mg tablets Follow schedule on package instructions    metoprolol succinate XL (TOPROL-XL) 25 mg, oral, Daily    naloxone (Narcan) 4 mg/0.1 mL nasal spray May repeat in 2-3 minutes if needed, alternating nostrils.    Nurtec ODT 75 mg tablet,disintegrating DISSOLVE 1 TABLET ON THE TONGUE DAILY AS NEEDED    oxyCODONE-acetaminophen (Percocet) 7.5-325 mg tablet 1 tablet, oral, Every 4 hours PRN    promethazine (PHENERGAN) 25 mg, oral, 3 times daily    syringe with needle, safety (Monoject Safety  "Syringes) 3 mL 25 gauge x 5/8\" syringe 1 each, miscellaneous, Every 7 days    tiZANidine (Zanaflex) 2 mg tablet 1 tablet, Every 12 hours scheduled (0630,1830)    traZODone (DESYREL) 150 mg, oral, Nightly PRN    traZODone (DESYREL) 150 mg, oral, Nightly PRN         Physical Exam      Assessment & Plan                    [1]   Allergies  Allergen Reactions    Hydroxychloroquine Other     \"suicidal ideation\"    Amoxicillin Hives and Rash    Humira [Adalimumab] Other     lymphadenopathy    Methotrexate Nausea/vomiting     With pill    Adhesive Rash    Adhesive Tape-Silicones Rash    Caffeine Other and Palpitations    Hydromorphone Nausea/vomiting    Omeprazole Rash    Penicillins Rash    Sulfa (Sulfonamide Antibiotics) Swelling and Rash     "

## 2025-05-23 ENCOUNTER — APPOINTMENT (OUTPATIENT)
Dept: PRIMARY CARE | Facility: CLINIC | Age: 40
End: 2025-05-23
Payer: COMMERCIAL

## 2025-06-11 ENCOUNTER — PATIENT MESSAGE (OUTPATIENT)
Facility: CLINIC | Age: 40
End: 2025-06-11
Payer: COMMERCIAL

## 2025-06-11 ENCOUNTER — TELEPHONE (OUTPATIENT)
Facility: CLINIC | Age: 40
End: 2025-06-11
Payer: COMMERCIAL

## 2025-06-11 DIAGNOSIS — M06.09 POLYARTHRITIS WITH NEGATIVE RHEUMATOID FACTOR (MULTI): ICD-10-CM

## 2025-06-11 DIAGNOSIS — G89.29 OTHER CHRONIC PAIN: ICD-10-CM

## 2025-06-11 RX ORDER — OXYCODONE AND ACETAMINOPHEN 7.5; 325 MG/1; MG/1
1 TABLET ORAL EVERY 4 HOURS PRN
Qty: 112 TABLET | Refills: 0 | Status: CANCELLED | OUTPATIENT
Start: 2025-06-11

## 2025-06-12 ENCOUNTER — PATIENT MESSAGE (OUTPATIENT)
Facility: CLINIC | Age: 40
End: 2025-06-12
Payer: COMMERCIAL

## 2025-06-12 LAB
25(OH)D3+25(OH)D2 SERPL-MCNC: 29 NG/ML (ref 30–100)
ALBUMIN SERPL-MCNC: 4.4 G/DL (ref 3.6–5.1)
ALBUMIN/CREAT UR: 17 MG/G CREAT
ALP SERPL-CCNC: 38 U/L (ref 31–125)
ALT SERPL-CCNC: 12 U/L (ref 6–29)
ANION GAP SERPL CALCULATED.4IONS-SCNC: 10 MMOL/L (CALC) (ref 7–17)
APPEARANCE UR: ABNORMAL
AST SERPL-CCNC: 10 U/L (ref 10–30)
BACTERIA #/AREA URNS HPF: ABNORMAL /HPF
BACTERIA UR CULT: ABNORMAL
BACTERIA UR CULT: ABNORMAL
BASOPHILS # BLD AUTO: 17 CELLS/UL (ref 0–200)
BASOPHILS NFR BLD AUTO: 0.3 %
BILIRUB SERPL-MCNC: 0.4 MG/DL (ref 0.2–1.2)
BILIRUB UR QL STRIP: NEGATIVE
BUN SERPL-MCNC: 12 MG/DL (ref 7–25)
C1Q SERPL-MCNC: NORMAL UG/ML
C3 SERPL-MCNC: 160 MG/DL (ref 83–193)
C4 SERPL-MCNC: 27 MG/DL (ref 15–57)
CALCIUM SERPL-MCNC: 9.1 MG/DL (ref 8.6–10.2)
CAOX CRY #/AREA URNS HPF: ABNORMAL /HPF
CHLORIDE SERPL-SCNC: 107 MMOL/L (ref 98–110)
CK SERPL-CCNC: 23 U/L (ref 20–239)
CO2 SERPL-SCNC: 22 MMOL/L (ref 20–32)
COLOR UR: YELLOW
CREAT SERPL-MCNC: 0.55 MG/DL (ref 0.5–0.99)
CREAT UR-MCNC: 227 MG/DL (ref 20–275)
CRP SERPL-MCNC: <3 MG/L
DSDNA AB SER-ACNC: 1 IU/ML
EGFRCR SERPLBLD CKD-EPI 2021: 118 ML/MIN/1.73M2
EOSINOPHIL # BLD AUTO: 41 CELLS/UL (ref 15–500)
EOSINOPHIL NFR BLD AUTO: 0.7 %
ERYTHROCYTE [DISTWIDTH] IN BLOOD BY AUTOMATED COUNT: 15.5 % (ref 11–15)
ERYTHROCYTE [SEDIMENTATION RATE] IN BLOOD BY WESTERGREN METHOD: 14 MM/H
GLUCOSE SERPL-MCNC: 90 MG/DL (ref 65–99)
GLUCOSE UR QL STRIP: NEGATIVE
HCT VFR BLD AUTO: 39.9 % (ref 35–45)
HGB BLD-MCNC: 13.5 G/DL (ref 11.7–15.5)
HGB UR QL STRIP: NEGATIVE
HYALINE CASTS #/AREA URNS LPF: ABNORMAL /LPF
KETONES UR QL STRIP: NEGATIVE
LEUKOCYTE ESTERASE UR QL STRIP: ABNORMAL
LYMPHOCYTES # BLD AUTO: 2685 CELLS/UL (ref 850–3900)
LYMPHOCYTES NFR BLD AUTO: 46.3 %
MCH RBC QN AUTO: 34.9 PG (ref 27–33)
MCHC RBC AUTO-ENTMCNC: 33.8 G/DL (ref 32–36)
MCV RBC AUTO: 103.1 FL (ref 80–100)
MICROALBUMIN UR-MCNC: 3.9 MG/DL
MONOCYTES # BLD AUTO: 394 CELLS/UL (ref 200–950)
MONOCYTES NFR BLD AUTO: 6.8 %
NEUTROPHILS # BLD AUTO: 2662 CELLS/UL (ref 1500–7800)
NEUTROPHILS NFR BLD AUTO: 45.9 %
NITRITE UR QL STRIP: NEGATIVE
PH UR STRIP: 6 [PH] (ref 5–8)
PLATELET # BLD AUTO: 366 THOUSAND/UL (ref 140–400)
PMV BLD REES-ECKER: 9.3 FL (ref 7.5–12.5)
POTASSIUM SERPL-SCNC: 3.6 MMOL/L (ref 3.5–5.3)
PROT SERPL-MCNC: 7 G/DL (ref 6.1–8.1)
PROT UR QL STRIP: ABNORMAL
RBC # BLD AUTO: 3.87 MILLION/UL (ref 3.8–5.1)
RBC #/AREA URNS HPF: ABNORMAL /HPF
SERVICE CMNT-IMP: ABNORMAL
SODIUM SERPL-SCNC: 139 MMOL/L (ref 135–146)
SP GR UR STRIP: 1.02 (ref 1–1.03)
SQUAMOUS #/AREA URNS HPF: ABNORMAL /HPF
T4 FREE SERPL-MCNC: 1 NG/DL (ref 0.8–1.8)
TSH SERPL-ACNC: 14.81 MIU/L
WBC # BLD AUTO: 5.8 THOUSAND/UL (ref 3.8–10.8)
WBC #/AREA URNS HPF: ABNORMAL /HPF

## 2025-06-13 ENCOUNTER — PATIENT MESSAGE (OUTPATIENT)
Facility: CLINIC | Age: 40
End: 2025-06-13
Payer: COMMERCIAL

## 2025-06-13 DIAGNOSIS — G89.29 OTHER CHRONIC PAIN: ICD-10-CM

## 2025-06-13 DIAGNOSIS — M06.09 POLYARTHRITIS WITH NEGATIVE RHEUMATOID FACTOR (MULTI): ICD-10-CM

## 2025-06-16 RX ORDER — OXYCODONE AND ACETAMINOPHEN 7.5; 325 MG/1; MG/1
1 TABLET ORAL EVERY 4 HOURS PRN
Qty: 112 TABLET | Refills: 0 | Status: CANCELLED | OUTPATIENT
Start: 2025-06-16

## 2025-06-18 ENCOUNTER — OFFICE VISIT (OUTPATIENT)
Facility: CLINIC | Age: 40
End: 2025-06-18
Payer: COMMERCIAL

## 2025-06-18 ENCOUNTER — TELEPHONE (OUTPATIENT)
Dept: PRIMARY CARE | Facility: CLINIC | Age: 40
End: 2025-06-18
Payer: COMMERCIAL

## 2025-06-18 VITALS
HEART RATE: 90 BPM | BODY MASS INDEX: 31.89 KG/M2 | HEIGHT: 63 IN | SYSTOLIC BLOOD PRESSURE: 166 MMHG | WEIGHT: 180 LBS | DIASTOLIC BLOOD PRESSURE: 100 MMHG | OXYGEN SATURATION: 100 %

## 2025-06-18 DIAGNOSIS — G89.29 OTHER CHRONIC PAIN: ICD-10-CM

## 2025-06-18 DIAGNOSIS — M06.09 POLYARTHRITIS WITH NEGATIVE RHEUMATOID FACTOR (MULTI): ICD-10-CM

## 2025-06-18 DIAGNOSIS — B99.9 INFECTION: Primary | ICD-10-CM

## 2025-06-18 LAB
25(OH)D3+25(OH)D2 SERPL-MCNC: 29 NG/ML (ref 30–100)
ALBUMIN SERPL-MCNC: 4.4 G/DL (ref 3.6–5.1)
ALBUMIN/CREAT UR: 17 MG/G CREAT
ALP SERPL-CCNC: 38 U/L (ref 31–125)
ALT SERPL-CCNC: 12 U/L (ref 6–29)
ANION GAP SERPL CALCULATED.4IONS-SCNC: 10 MMOL/L (CALC) (ref 7–17)
APPEARANCE UR: ABNORMAL
AST SERPL-CCNC: 10 U/L (ref 10–30)
BACTERIA #/AREA URNS HPF: ABNORMAL /HPF
BACTERIA UR CULT: ABNORMAL
BACTERIA UR CULT: ABNORMAL
BASOPHILS # BLD AUTO: 17 CELLS/UL (ref 0–200)
BASOPHILS NFR BLD AUTO: 0.3 %
BILIRUB SERPL-MCNC: 0.4 MG/DL (ref 0.2–1.2)
BILIRUB UR QL STRIP: NEGATIVE
BUN SERPL-MCNC: 12 MG/DL (ref 7–25)
C1Q SERPL-MCNC: 7.7 MG/DL (ref 5–8.6)
C3 SERPL-MCNC: 160 MG/DL (ref 83–193)
C4 SERPL-MCNC: 27 MG/DL (ref 15–57)
CALCIUM SERPL-MCNC: 9.1 MG/DL (ref 8.6–10.2)
CAOX CRY #/AREA URNS HPF: ABNORMAL /HPF
CHLORIDE SERPL-SCNC: 107 MMOL/L (ref 98–110)
CK SERPL-CCNC: 23 U/L (ref 20–239)
CO2 SERPL-SCNC: 22 MMOL/L (ref 20–32)
COLOR UR: YELLOW
CREAT SERPL-MCNC: 0.55 MG/DL (ref 0.5–0.99)
CREAT UR-MCNC: 227 MG/DL (ref 20–275)
CRP SERPL-MCNC: <3 MG/L
DSDNA AB SER-ACNC: 1 IU/ML
EGFRCR SERPLBLD CKD-EPI 2021: 118 ML/MIN/1.73M2
EOSINOPHIL # BLD AUTO: 41 CELLS/UL (ref 15–500)
EOSINOPHIL NFR BLD AUTO: 0.7 %
ERYTHROCYTE [DISTWIDTH] IN BLOOD BY AUTOMATED COUNT: 15.5 % (ref 11–15)
ERYTHROCYTE [SEDIMENTATION RATE] IN BLOOD BY WESTERGREN METHOD: 14 MM/H
GLUCOSE SERPL-MCNC: 90 MG/DL (ref 65–99)
GLUCOSE UR QL STRIP: NEGATIVE
HCT VFR BLD AUTO: 39.9 % (ref 35–45)
HGB BLD-MCNC: 13.5 G/DL (ref 11.7–15.5)
HGB UR QL STRIP: NEGATIVE
HYALINE CASTS #/AREA URNS LPF: ABNORMAL /LPF
KETONES UR QL STRIP: NEGATIVE
LEUKOCYTE ESTERASE UR QL STRIP: ABNORMAL
LYMPHOCYTES # BLD AUTO: 2685 CELLS/UL (ref 850–3900)
LYMPHOCYTES NFR BLD AUTO: 46.3 %
MCH RBC QN AUTO: 34.9 PG (ref 27–33)
MCHC RBC AUTO-ENTMCNC: 33.8 G/DL (ref 32–36)
MCV RBC AUTO: 103.1 FL (ref 80–100)
MICROALBUMIN UR-MCNC: 3.9 MG/DL
MONOCYTES # BLD AUTO: 394 CELLS/UL (ref 200–950)
MONOCYTES NFR BLD AUTO: 6.8 %
NEUTROPHILS # BLD AUTO: 2662 CELLS/UL (ref 1500–7800)
NEUTROPHILS NFR BLD AUTO: 45.9 %
NITRITE UR QL STRIP: NEGATIVE
PH UR STRIP: 6 [PH] (ref 5–8)
PLATELET # BLD AUTO: 366 THOUSAND/UL (ref 140–400)
PMV BLD REES-ECKER: 9.3 FL (ref 7.5–12.5)
POTASSIUM SERPL-SCNC: 3.6 MMOL/L (ref 3.5–5.3)
PROT SERPL-MCNC: 7 G/DL (ref 6.1–8.1)
PROT UR QL STRIP: ABNORMAL
RBC # BLD AUTO: 3.87 MILLION/UL (ref 3.8–5.1)
RBC #/AREA URNS HPF: ABNORMAL /HPF
SERVICE CMNT-IMP: ABNORMAL
SODIUM SERPL-SCNC: 139 MMOL/L (ref 135–146)
SP GR UR STRIP: 1.02 (ref 1–1.03)
SQUAMOUS #/AREA URNS HPF: ABNORMAL /HPF
T4 FREE SERPL-MCNC: 1 NG/DL (ref 0.8–1.8)
TSH SERPL-ACNC: 14.81 MIU/L
WBC # BLD AUTO: 5.8 THOUSAND/UL (ref 3.8–10.8)
WBC #/AREA URNS HPF: ABNORMAL /HPF

## 2025-06-18 PROCEDURE — 99214 OFFICE O/P EST MOD 30 MIN: CPT | Performed by: INTERNAL MEDICINE

## 2025-06-18 PROCEDURE — 3008F BODY MASS INDEX DOCD: CPT | Performed by: INTERNAL MEDICINE

## 2025-06-18 RX ORDER — SYRINGE-NEEDLE,INSULIN,0.5 ML 30 GX5/16"
1 SYRINGE, EMPTY DISPOSABLE MISCELLANEOUS
Qty: 30 EACH | Refills: 3 | Status: SHIPPED | OUTPATIENT
Start: 2025-06-18

## 2025-06-18 RX ORDER — FLUCONAZOLE 150 MG/1
TABLET ORAL
Qty: 12 TABLET | Refills: 1 | Status: SHIPPED | OUTPATIENT
Start: 2025-06-18

## 2025-06-18 RX ORDER — SULFAMETHOXAZOLE AND TRIMETHOPRIM 400; 80 MG/1; MG/1
1 TABLET ORAL 2 TIMES DAILY
Qty: 14 TABLET | Refills: 0 | Status: SHIPPED | OUTPATIENT
Start: 2025-06-18 | End: 2025-06-25

## 2025-06-18 RX ORDER — FOLIC ACID 1 MG/1
TABLET ORAL
Qty: 90 TABLET | Refills: 3 | Status: SHIPPED | OUTPATIENT
Start: 2025-06-18

## 2025-06-18 RX ORDER — METHOTREXATE 25 MG/ML
15 INJECTION INTRA-ARTERIAL; INTRAMUSCULAR; INTRATHECAL; INTRAVENOUS
Qty: 4 ML | Refills: 5 | Status: SHIPPED | OUTPATIENT
Start: 2025-06-18

## 2025-06-18 RX ORDER — OXYCODONE AND ACETAMINOPHEN 7.5; 325 MG/1; MG/1
1 TABLET ORAL EVERY 4 HOURS PRN
Qty: 112 TABLET | Refills: 0 | Status: SHIPPED | OUTPATIENT
Start: 2025-06-18 | End: 2025-07-02 | Stop reason: SDUPTHER

## 2025-06-18 ASSESSMENT — ROUTINE ASSESSMENT OF PATIENT INDEX DATA (RAPID3)
IN_OUT_TRANSPORT: WITHOUT ANY DIFFICULTY
WEIGHTED_TOTAL_SCORE: 3.43
TURN_FAUCETS_OFF: WITHOUT ANY DIFFICULTY
WASH_DRY_BODY: WITH SOME DIFFICULTY
ON A SCALE OF ONE TO TEN, HOW MUCH PAIN HAVE YOU HAD BECAUSE OF YOUR CONDITION OVER THE PAST WEEK?: 4.5
LIFT_CUP_TO_MOUTH: WITHOUT ANY DIFFICULTY
FEELINGS_DEPRESSION: WITHOUT ANY DIFFICULTY
PICK_CLOTHES_OFF_FLOOR: WITH SOME DIFFICULTY
GOOD_NIGHTS_SLEEP: WITH SOME DIFFICULTY
ON A SCALE OF ONE TO TEN, CONSIDERING ALL THE WAYS IN WHICH ILLNESS AND HEALTH CONDITIONS MAY AFFECT YOU AT THIS TIME, PLEASE INDICATE BELOW HOW YOU ARE DOING:: 3.5
WALK_KILOMETERS: WITH MUCH DIFFICULTY
DRESS_YOURSELF: WITHOUT ANY DIFFICULTY
FEELINGS_ANXIETY_NERVOUS: WITH SOME DIFFICULTY
PARTIPATE_RECREATIONAL_ACTIVITIES: WITH SOME DIFFICULTY
ON A SCALE OF ONE TO TEN, CONSIDERING ALL THE WAYS IN WHICH ILLNESS AND HEALTH CONDITIONS MAY AFFECT YOU AT THIS TIME, PLEASE INDICATE BELOW HOW YOU ARE DOING:: 3.5
ON A SCALE OF ONE TO TEN, HOW MUCH PAIN HAVE YOU HAD BECAUSE OF YOUR CONDITION OVER THE PAST WEEK?: 4.5
TOTAL RAPID3 SCORE: 10.3
WALK_FLAT_GROUND: WITH SOME DIFFICULTY
SUM OF QUESTIONS A TO J: 7
FN_SCORE: 2.3
SEVERITY_SCORE: MODERATE SEVERITY (MS)
SEVERITY_SCORE: 0
IN_OUT_BED: WITH SOME DIFFICULTY

## 2025-06-18 ASSESSMENT — ENCOUNTER SYMPTOMS
DEPRESSION: 0
OCCASIONAL FEELINGS OF UNSTEADINESS: 0
LOSS OF SENSATION IN FEET: 0

## 2025-06-18 NOTE — TELEPHONE ENCOUNTER
Pt went to rheumatologist today and her BP was 166/100. She is wondering if she needs her meds increased. I did make her appt for first available, July 3, do you want to see her before that? LOV 03/03/25

## 2025-06-18 NOTE — PROGRESS NOTES
[facility]/  Rheumatology  [address], [address]  [place] [address]  Phone: [phone]  Fax: [phone]    Rheumatology Progress Note 06/18/25    Zuleyma Farrell is a 40 y.o. female here for   Chief Complaint   Patient presents with    Follow-up       Last Visit: 2/12/25    Rheum Hx  Referred by Dr. Milian for history of lupus, Sjogren's.  Chief complaint: Joint pain, back hip leg pain is the worst  Since 2017  Slow onset  Remittent course  Precipitating factors: Standing, walking too long  Associated symptoms: Fatigue, swelling, tingling  Better: Sitting, laying, pressure  Worse: Walking, standing, straining  Previous treatments:  Naproxensomewhat helpful  Ibuprofen somewhat helpful  Meloxicam no help  Acetaminophen no help  Steroids very helpful but makes her shaky  Norco very helpful  Plaquenil allergy agitation/suicidal ideation  Occupation: Homemaker  Seen by rheumatology before, no records available except for last note Dr. Guzmán  9/7 /2022. Per Dr. Guzmán, positive SSA deemed more likely indicative of lupus rather than  Sjogren's.  Per review of note positive MARK positive SSA diagnosed with subacute lupus per  dermatology Dr Anderson in 2014. Saw Dr. Kalpesh Cook and at [facility]?. Reaction to  Plaquenil. Not deemed to be a true allergy per Dr. Correa and advised to resume Plaquenil.  Patient declined and is here for yet another evaluation.  Took Plaquenil but had to discontinue due to allergy agitation/suicidal ideation.  Following with neuro for cervicalgia paresthesia and migraines. Treated with Ajovy and  tizanidine.  Tenderness and swelling of bilateral hands knees ankles  Tenderness elbows left shoulder area hip pelvic area lower back  Overall flulike symptoms  Left side worse headaches elbow pain, worsening leg pain with difficulty doing ADLs. Legs  shake coming down the steps.  Review of systems positive for:  Fever  Fatigue  Dry eyes dry mouth  Sore  throat  Nasal and mouth sores  Swollen tender lumps of neck  Palpitations-frequent, beta-blocker helps; seen by cardiology and diagnosed with  tachycardia  Swelling of the legs by the end of the day-ankles  GERD abdominal pain nausea vomiting diarrhea  History of kidney stones  History of anemia treated with oral iron; not requiring IV iron or blood transfusion  History of cancer  Rashes  Sun sensitivity  Joint pain swelling stiffness  Back pain that does not improve with rest  Fortical type issues  Myalgias  Weakness legs  Numbness tingling hands  History of thyroid disease  Anxiety  Family history positive for RA-mother and aunt: Hashimoto's-mother.    Component      Latest Ref Rng 9/21/2020 6/29/2022   MARK  POSITIVE… !    MARK Titer  1:160 (C)   MARK Pattern  Nuclear fine speckled… (C)   Rheumatoid Factor      0 - 20 IU/ML 10       Component      Latest Ref Rng 7/18/2023   Total Protein, Spe 6.4…    Albumin, SPE 3.9…    Alpha 1 Globulin 0.3…    Alpha 2 Globulin 0.6…    Beta Glob SPE 0.7…    Gamma Glob,Spe 1.0…    Protein Electrophoresis Comment NORMAL…    IMMUNOFIXATION INTERP NORMAL…    Transglutaminase IgA <1…    Transglutaminase IgG 9…    DEAMIDATED GLIADIN PEPTIDE IGA <1…    DEAMIDATED GLIADIN PEPTIDE IGG 1…    IgG Cardiolipin Ab 11.4…    IgM Cardiolipin Ab 0.4…    IgA Cardiolipin Ab <0.5…    Beta-2 Glyco 1 IgG 11.3…    Beta 2 Glyco 1 IgM 0.3…    Beta-2 Glyco 1 IgA <0.6…    PTT-LA 33.0…    DRVVT 37.1…    Lupus Anticoagulant Interpretation Neg   ANCA IFA Titer <1:20…    ANCA IFA Pattern None Detected…    UMPA Interpretation No M prot   Rheumatoid Factor      0 - 20 IU/ML 10    Angiotensin 1 Conv 23…    Anti-C1Q Ab, IgG (RDL) <20…    SSA ANTIBODIES >8.0… !    SSB ANTIBODIES <0.2…    HLA-B27 Dna Result NEGATIVE…    Citrulline Antibody, IgG <1…    Centromere Ab <0.2…    Anti-Chromatin <0.2…    NATALEE-1 ANTIBODY <0.2…    RNP Antibody <0.2…    RIBOSOMAL RNP AB <0.2…    SCL-70 ABS EIA <0.2…    SM Antibody <0.2…    KAYLIE,  Broad Spectrum Zeb Serum NEGATIVE Performed at [place] [address] [place] [address]       Previous Tx  Meloxicam- some help  Tizanidine- helps with with neck pain  Methotrexate- helpful but had to discontinue due to N/V  Naproxen- somewhat helpful  Ibuprofen- somewhat helpful  Acetaminophen- no help  Steroids- very helpful but makes her shaky  Norco- very helpful  Plaquenil-allergy agitation/suicidal ideation  Sulfa allergy    Health Maintenance  DXA- none  Malignancy Hx- 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; LN clear  Component      Latest Ref Rng 5/4/2023   Lyme IgG Wblot NEGATIVE…    Lyme IgG Bands p 93…    Lyme IgM Wblot NEGATIVE…    Lyme IgM Bands No Bands Seen    Lyme Interp Negative     Component      Latest Ref Rng 7/18/2023   Hepatitis B Surface AG      NEG  NEGATIVE    Hepatitis C AB NONREACTIVE…    HIV 1/2 Antigen/Antibody Screen with Reflex to Confirmation NONREACTIVE…        Immunization History   Administered Date(s) Administered    Flu vaccine (IIV4), preservative free *Check age/dose* 09/30/2019    Pneumococcal polysaccharide vaccine, 23-valent, age 2 years and older (PNEUMOVAX 23) 10/15/2019    Tdap vaccine, age 7 year and older (BOOSTRIX, ADACEL) 03/07/2011, 10/15/2016          Past Medical History:   Diagnosis Date    Anxiety     Exertional shortness of breath 06/07/2023    Foot swelling 06/07/2023    History of cholecystectomy 01/19/2024    History of gestational diabetes mellitus (GDM) 01/19/2024    Hyperglycemia 01/19/2024    Kidney stones     Lupus     dx 09/2013    Mastodynia 06/07/2023    Other chest pain 01/10/2014    Atypical chest pain    Other conditions influencing health status 04/18/2016    Skin Lesion    Other muscle spasm 02/19/2016    Spasm of cervical paraspinous muscle    Pain in unspecified ankle and joints of unspecified foot 01/27/2015    Ankle joint pain    Pain in unspecified limb 07/29/2016    Limb pain    Pain in unspecified upper arm  2016    Pain in axilla    Pancreatitis (HHS-HCC) 2024    Papillary microcarcinoma of thyroid (Multi) 2023    Personal history of diseases of the skin and subcutaneous tissue 2014    History of urticaria    Personal history of other diseases of the musculoskeletal system and connective tissue 2013    History of low back pain    Personal history of other diseases of the nervous system and sense organs 2016    History of tension headache    Personal history of other diseases of the respiratory system 2014    History of sinusitis    Personal history of other endocrine, nutritional and metabolic disease 2015    History of vitamin D deficiency    Personal history of other endocrine, nutritional and metabolic disease 2016    History of Hashimoto thyroiditis    Personal history of other endocrine, nutritional and metabolic disease 2016    History of hypokalemia    Personal history of other infectious and parasitic diseases 2016    History of herpes zoster    Personal history of other specified conditions 2014    History of urinary frequency    Personal history of other specified conditions 2021    History of abdominal pain    Personal history of other specified conditions 2021    History of nausea    Personal history of urinary (tract) infections 2014    History of urinary tract infection    Right upper quadrant pain 2013    Abdominal pain, RUQ (right upper quadrant)    Sleep disorder 2023    Strain of muscle, fascia and tendon at neck level, initial encounter 2016    Cervical strain    Swelling of both hands 2023    Tachycardia     Thyroid cancer (Multi)       Past Surgical History:   Procedure Laterality Date    BLADDER SUSPENSION  2021    bladder sling     SECTION, CLASSIC  06/27/2013    x3    CHOLECYSTECTOMY  2013    Cholecystectomy Laparoscopic    COLONOSCOPY  2021    GALLBLADDER SURGERY   01/21/2016    Gallbladder Surgery    HYSTERECTOMY  03/01/2022    OTHER SURGICAL HISTORY      GASTRO POLYPS REMOVED    OTHER SURGICAL HISTORY  02/2021    ABLATION    TOTAL THYROIDECTOMY  04/29/2016    Thyroid Surgery Total Thyroidectomy    TUBAL LIGATION  04/10/2013    Tubal Ligation      Current Outpatient Medications   Medication Sig Dispense Refill    Ajovy Autoinjector 225 mg/1.5 mL auto-injector Inject 1 Pen (225 mg) under the skin every 30 (thirty) days.      albuterol 90 mcg/actuation inhaler Inhale 2 puffs every 4 hours if needed for wheezing. 18 g 0    buPROPion XL (Wellbutrin XL) 150 mg 24 hr tablet Take 1 tablet (150 mg) by mouth once daily in the morning. Do not crush, chew, or split. 30 tablet 1    cyanocobalamin (Vitamin B-12) 100 mcg tablet Take 1 tablet (100 mcg) by mouth once daily.      docusate sodium (Colace) 100 mg capsule Take 1 capsule (100 mg) by mouth 2 times a day as needed for constipation (constipation). 60 capsule 5    escitalopram (Lexapro) 20 mg tablet Take 1 tablet (20 mg) by mouth once daily. 90 tablet 1    famotidine (Pepcid) 20 mg tablet Take 1 tablet (20 mg) by mouth 2 times a day. 180 tablet 3    famotidine (Pepcid) 20 mg tablet Take 1 tablet (20 mg) by mouth 2 times a day. 180 tablet 3    folic acid (Folvite) 1 mg tablet Take one daily except the day of methotrexate. 30 tablet 5    FreeStyle Davy 3 Sensor device Please change sensor every 14 days. 2 each 11    levothyroxine (Synthroid) 137 mcg tablet Take 1 tablet (137 mcg) by mouth early in the morning.. 30 tablet 11    lisinopril 10 mg tablet Take 1 tablet (10 mg) by mouth once daily. 90 tablet 0    melatonin 10 mg capsule Take 1 capsule (10 mg) by mouth once daily at bedtime.      meloxicam (Mobic) 15 mg tablet Take 1 tablet (15 mg) by mouth early in the morning..      methotrexate 25 mg/mL INFUSE 0.5 ML EVERY 5 MINUTES INTO A VENOUS CATHETER 1 TIME FOR 1 DOSE      methylPREDNISolone (Medrol Dospak) 4 mg tablets Follow  "schedule on package instructions 21 tablet 1    metoprolol succinate XL (Toprol-XL) 25 mg 24 hr tablet Take 1 tablet (25 mg) by mouth once daily. 90 tablet 3    naloxone (Narcan) 4 mg/0.1 mL nasal spray May repeat in 2-3 minutes if needed, alternating nostrils. 2 each 0    Nurtec ODT 75 mg tablet,disintegrating DISSOLVE 1 TABLET ON THE TONGUE DAILY AS NEEDED      oxyCODONE-acetaminophen (Percocet) 7.5-325 mg tablet Take 1 tablet by mouth every 4 hours if needed for severe pain (7 - 10) or moderate pain (4 - 6). Do not fill before May 26, 2025. 112 tablet 0    promethazine (Phenergan) 25 mg tablet Take 1 tablet (25 mg) by mouth 3 times a day. 90 tablet 3    syringe with needle, safety (Monoject Safety Syringes) 3 mL 25 gauge x 5/8\" syringe 1 each every 7 days. 100 each 3    tiZANidine (Zanaflex) 2 mg tablet Take 1 tablet (2 mg) by mouth every 12 hours.      traZODone (Desyrel) 50 mg tablet Take 3 tablets (150 mg) by mouth as needed at bedtime for sleep. 90 tablet 1    traZODone (Desyrel) 50 mg tablet Take 3 tablets (150 mg) by mouth as needed at bedtime for sleep. 90 tablet 1     No current facility-administered medications for this visit.      Allergies   Allergen Reactions    Hydroxychloroquine Other     \"suicidal ideation\"    Amoxicillin Hives and Rash    Humira [Adalimumab] Other     lymphadenopathy    Methotrexate Nausea/vomiting     With pill    Adhesive Rash    Adhesive Tape-Silicones Rash    Caffeine Other and Palpitations    Hydromorphone Nausea/vomiting    Omeprazole Rash    Penicillins Rash    Sulfa (Sulfonamide Antibiotics) Swelling and Rash        Vitals:    06/18/25 1304   BP: (!) 166/100   Pulse: 90   SpO2: 100%   Weight: 81.6 kg (180 lb)   Height: 1.6 m (5' 3\")           Rapid 3  Function Score (FN): 2.3  Pain Score (PN) (0-10): 4.5  Patient Global (PTGL) (0-10): 3.5  Rapid3 Score: 10.3  RAPID3 Weighted Score: 3.43       Workup  Component      Latest Ref Rng 6/10/2025   WHITE BLOOD CELL COUNT      3.8 " - 10.8 Thousand/uL 5.8    RED BLOOD CELL COUNT      3.80 - 5.10 Million/uL 3.87    HEMOGLOBIN      11.7 - 15.5 g/dL 13.5    HEMATOCRIT      35.0 - 45.0 % 39.9    MCV      80.0 - 100.0 fL 103.1 (H)    MCH      27.0 - 33.0 pg 34.9 (H)    MCHC      32.0 - 36.0 g/dL 33.8    RDW      11.0 - 15.0 % 15.5 (H)    PLATELET COUNT      140 - 400 Thousand/uL 366    MPV      7.5 - 12.5 fL 9.3    ABSOLUTE NEUTROPHILS      1,500 - 7,800 cells/uL 2,662    ABSOLUTE LYMPHOCYTES      850 - 3,900 cells/uL 2,685    ABSOLUTE MONOCYTES      200 - 950 cells/uL 394    ABSOLUTE EOSINOPHILS      15 - 500 cells/uL 41    ABSOLUTE BASOPHILS      0 - 200 cells/uL 17    NEUTROPHILS      % 45.9    LYMPHOCYTES      % 46.3    MONOCYTES      % 6.8    EOSINOPHILS      % 0.7    BASOPHILS      % 0.3    COLOR      YELLOW  YELLOW    APPEARANCE      CLEAR  CLOUDY !    SPECIFIC GRAVITY      1.001 - 1.035  1.023    PH      5.0 - 8.0  6.0    GLUCOSE      NEGATIVE  NEGATIVE    BILIRUBIN      NEGATIVE  NEGATIVE    KETONES      NEGATIVE  NEGATIVE    OCCULT BLOOD      NEGATIVE  NEGATIVE    PROTEIN      NEGATIVE  TRACE !    NITRITE      NEGATIVE  NEGATIVE    LEUKOCYTE ESTERASE      NEGATIVE  TRACE !    WBC      < OR = 5 /HPF 6-10 !    RBC      < OR = 2 /HPF NONE SEEN    SQUAMOUS EPITHELIAL CELLS      < OR = 5 /HPF 40-60 !    BACTERIA      NONE SEEN /HPF FEW !    CALCIUM OXALATE CRYSTALS      NONE OR FEW /HPF FEW    HYALINE CAST      NONE SEEN /LPF NONE SEEN    NOTE --    CULTURE, URINE, ROUTINE SEE NOTE    CULTURE, URINE, ROUTINE --    GLUCOSE      65 - 99 mg/dL 90    UREA NITROGEN (BUN)      7 - 25 mg/dL 12    CREATININE      0.50 - 0.99 mg/dL 0.55    EGFR      > OR = 60 mL/min/1.73m2 118    SODIUM      135 - 146 mmol/L 139    POTASSIUM      3.5 - 5.3 mmol/L 3.6    CHLORIDE      98 - 110 mmol/L 107    CARBON DIOXIDE      20 - 32 mmol/L 22    ELECTROLYTE BALANCE      7 - 17 mmol/L (calc) 10    CALCIUM      8.6 - 10.2 mg/dL 9.1    PROTEIN, TOTAL      6.1 - 8.1 g/dL  7.0    ALBUMIN      3.6 - 5.1 g/dL 4.4    BILIRUBIN, TOTAL      0.2 - 1.2 mg/dL 0.4    ALKALINE PHOSPHATASE      31 - 125 U/L 38    AST      10 - 30 U/L 10    ALT      6 - 29 U/L 12    CREATININE, RANDOM URINE      20 - 275 mg/dL 227    ALBUMIN, URINE      See Note: mg/dL 3.9    ALBUMIN/CREATININE RATIO, RANDOM URINE      <30 mg/g creat 17    TSH      mIU/L 14.81 (H)    T4, FREE      0.8 - 1.8 ng/dL 1.0    C-REACTIVE PROTEIN      <8.0 mg/L <3.0    CREATINE KINASE, TOTAL      20 - 239 U/L 23    SED RATE BY MODIFIED WESTERGREN      < OR = 20 mm/h 14    VITAMIN D,25-OH,TOTAL,IA      30 - 100 ng/mL 29 (L)    DNA (DS) ANTIBODY      IU/mL 1    COMPLEMENT COMPONENT C3C      83 - 193 mg/dL 160    COMPLEMENT COMPONENT C4C      15 - 57 mg/dL 27        Assessment/Plan  No diagnosis found.           No orders of the defined types were placed in this encounter.       Previously, adherent and tolerating meloxicam.  Had to discontinue methotrexate due to N/V.  Diagnosed with POTS by PCP - no tx  Denies any recent or current infection.  Not on any glucocorticoids.  BP high today, no H/A/CP, feels ok. Will be checking with her PCP. Took her BB today.  Tizanidine helps with neck pain/H/A.  Had hx of lupus but only currently joint aches  Oral sore does not appear SLE  Hx of biopsy showing SCL. She is currently not having the rash, just sun sens and has a red non tender nodules on LUE- stable  Hx of dermatofibroma removed from RLE  Tramadol not much nelp   ROS+ for intermediate sicca sx, sore throat, nasal sore and oral sore chronic- non tender, swollen glands/nodes, GERD, diarrhea and constipation/ hx IBS, sun sens, anemia- not eating well, no bleeding, joint pain, weakness hands, numbness/tingling.  Rapid 3 consistent with moderate severity.  Labs reviewed  D/w pt tx options and decided on   Trial of Humira  Continue meloxicam with plan to eventually wean off as able.  Hydrate well  Pain med  Advised of possible side effects  including drug induced lupus sx, infection, and importance of monitoring.   All questions answered.  Patient to follow up with primary care provider regarding all other medical issues not addressed today and for medical chart updating.         Previously, Humira on hold for now.   Pitting edema  L upper LN and due mammogram.  Meloxicam- not cause of swelling  Medrol prn  Pain medicine helpful to function.  Flu A and PNA- tx tamiflu and azithromycin  Methotrexate working really well but random nausea and weakness.   ROS+ for drenching night sweats without fever, sicca, mucosal sores, swollen glands/nodes, cough, GERD, GI issues, sun sens, joint pain/swelling, numbness/tingling, anxiety.  Thyroid levels extremely high  On  an off painful swelling of legs, ankles and feet. Sometimes hands and fingers.  Rapid 3 consistent with moderate  severity.  Labs reviewed  D/w pt tx options   Humira on hold.  Levothyroxine- advised to take on empty stomach and without other meds.   Recommend Endo Eval  Reassess  Supportive care for LE edema  Advised of possible side effects and importance of monitoring.   All questions answered.  Patient to follow up with primary care provider regarding all other medical issues not addressed today and for medical chart updating.         Since last appt, adherent and tolerating subcutaneous meyhotrexate dongw well not nausea of diarrhea wilike sehw asw with the oral.  Thinks it is working.    Boil groin  Denies any recent or current infection.  Not on any NSAIDs or glucocorticoids.  ROS+ for unintentional weight loss, drenching night sweats without fever, fatigue, sicca symptoms with red painful eyes without vision loss, mucosal sores, swollen glands nodes, cough, GERD, GI issues, sun sensitivity, joint pain swelling stiffness 1 hour in the morning, Raynaud's without pitting ulceration, weakness hands, numbness tingling anxiety.  Very bad restless legs at nighttime.  Makes it very hard to fall  asleep.  Also wake up a lot during the night due to hip and left leg pain.  Rapid 3 consistent with moderate severity  Labs reviewed  D/w pt tx options and decided on ***  Advised of possible side effects and importance of monitoring.   All questions answered.  Patient to follow up with primary care provider regarding all other medical issues not addressed today and for medical chart updating.        Jolly Conteh MD      Patient Care Team:  MAYELIN Davis-CNP as PCP - General (Family Medicine)  HAN Link as PCP - [place] [employer] PCP  Abiodun Avila PA-C as PCP - [facility] PCP  Jolly Conteh MD as Consulting Physician (Rheumatology)

## 2025-06-23 ENCOUNTER — PATIENT MESSAGE (OUTPATIENT)
Dept: PRIMARY CARE | Facility: CLINIC | Age: 40
End: 2025-06-23
Payer: COMMERCIAL

## 2025-06-23 DIAGNOSIS — I10 PRIMARY HYPERTENSION: ICD-10-CM

## 2025-06-23 RX ORDER — LISINOPRIL 20 MG/1
20 TABLET ORAL DAILY
Qty: 30 TABLET | Refills: 1 | Status: SHIPPED | OUTPATIENT
Start: 2025-06-23

## 2025-06-24 ENCOUNTER — HOSPITAL ENCOUNTER (EMERGENCY)
Facility: HOSPITAL | Age: 40
Discharge: HOME | End: 2025-06-24
Attending: STUDENT IN AN ORGANIZED HEALTH CARE EDUCATION/TRAINING PROGRAM
Payer: COMMERCIAL

## 2025-06-24 VITALS
HEIGHT: 63 IN | RESPIRATION RATE: 18 BRPM | HEART RATE: 80 BPM | SYSTOLIC BLOOD PRESSURE: 155 MMHG | OXYGEN SATURATION: 98 % | BODY MASS INDEX: 31.98 KG/M2 | TEMPERATURE: 97.9 F | WEIGHT: 180.5 LBS | DIASTOLIC BLOOD PRESSURE: 97 MMHG

## 2025-06-24 DIAGNOSIS — I10 HYPERTENSION, UNSPECIFIED TYPE: Primary | ICD-10-CM

## 2025-06-24 PROCEDURE — 99281 EMR DPT VST MAYX REQ PHY/QHP: CPT | Performed by: STUDENT IN AN ORGANIZED HEALTH CARE EDUCATION/TRAINING PROGRAM

## 2025-06-24 ASSESSMENT — PAIN DESCRIPTION - DESCRIPTORS: DESCRIPTORS: HEADACHE

## 2025-06-24 ASSESSMENT — PAIN SCALES - GENERAL: PAINLEVEL_OUTOF10: 4

## 2025-06-24 ASSESSMENT — PAIN - FUNCTIONAL ASSESSMENT: PAIN_FUNCTIONAL_ASSESSMENT: 0-10

## 2025-06-24 ASSESSMENT — PAIN DESCRIPTION - LOCATION: LOCATION: HEAD

## 2025-06-24 ASSESSMENT — PAIN DESCRIPTION - ORIENTATION: ORIENTATION: LEFT

## 2025-06-24 ASSESSMENT — PAIN DESCRIPTION - FREQUENCY: FREQUENCY: CONSTANT/CONTINUOUS

## 2025-06-24 ASSESSMENT — PAIN DESCRIPTION - PAIN TYPE: TYPE: ACUTE PAIN

## 2025-06-24 NOTE — DISCHARGE INSTRUCTIONS
Continue taking her medications as prescribed your PCP.  Please follow-up with your PCP as soon as possible.  Please come back if you develop headache that is getting progressively worse, dizziness, weakness, numbness, chest pain, difficulty breathing, abdominal pain, inability to ambulate, or any other symptoms.

## 2025-06-24 NOTE — ED PROVIDER NOTES
Emergency Department Provider Note       History of Present Illness     History provided by: Patient  Limitations to History: None  External Records Reviewed with Brief Summary: EMR reviewed     HPI:  Zuleyma Farrell is a 40 y.o. female   Past medical history of hypertension presented to the ER due to elevated blood pressure.  Patient has history of high blood pressure was recently increased her dose of lisinopril, first dose of double dose of lisinopril was yesterday and was sent today to the ED due to hypertension by the PCP.  Patient has a headache in the frontal area, patient has history of migraines, feels like old headache.  Denies blurry vision, diplopia, weakness, numbness, chest pain, difficulty breathing, abdominal pain, nausea, vomiting, dizziness,  ataxia.    Physical Exam   Triage vitals:  T 37.2 °C (99 °F)  HR 94  BP (!) 178/109  RR 18  O2 97 % None (Room air)    Physical Exam  Vitals and nursing note reviewed.   Constitutional:       Appearance: Normal appearance.   HENT:      Head: Normocephalic and atraumatic.      Nose: Nose normal.      Mouth/Throat:      Mouth: Mucous membranes are moist.   Cardiovascular:      Rate and Rhythm: Normal rate and regular rhythm.      Heart sounds: Normal heart sounds.   Pulmonary:      Effort: Pulmonary effort is normal.      Breath sounds: Normal breath sounds.   Abdominal:      General: Abdomen is flat.      Tenderness: There is no abdominal tenderness.   Musculoskeletal:         General: Normal range of motion.      Cervical back: Normal range of motion and neck supple.   Skin:     General: Skin is warm.   Neurological:      General: No focal deficit present.      Mental Status: She is alert and oriented to person, place, and time. Mental status is at baseline.      GCS: GCS eye subscore is 4. GCS verbal subscore is 5. GCS motor subscore is 6.      Cranial Nerves: Cranial nerves 2-12 are intact.      Sensory: Sensation is intact.      Motor: Motor function  is intact.      Coordination: Coordination is intact.   Psychiatric:         Mood and Affect: Mood normal.           Medical Decision Making & ED Course   Medical Decision Makin y.o. female   Here with asymptomatic hypertension in the setting of increased her lisinopril dosing yesterday.  Patient does not have any signs of end organ damage.  Neuroexam is within normal limits.  Headache is feels like old migraine, patient refused medication.   Patient is well-appearing, not in distress, long conversations sustained with the patient that  there is not an indication to decrease her blood pressure acutely, that she should keep a log of blood pressure at home  follow-up with her PCP as soon as possible.  Patient agreed and will do that.      ----      Differential diagnoses considered include but are not limited to: see above     Social Determinants of Health which Significantly Impact Care: Social Determinants of Health which Significantly Impact Care: None identified     EKG Independent Interpretation: EKG interpreted by myself. Please see ED Course for full interpretation.    Independent Result Review and Interpretation: None obtained    Chronic conditions affecting the patient's care: As documented above in [facility]    The patient was discussed with the following consultants/services: None    Care Considerations: As documented above in [facility]    ED Course:  ED Course as of 25   EKG interpreted by me: Normal sinus rhythm, right axis deviation, QTc is 486, no ST elevations or depressions.  Patient had right axis deviation in the past. [CC]      ED Course User Index  [CC] Dora Rider MD         Diagnoses as of 25   Hypertension, unspecified type       Disposition   As a result of the work-up, the patient was discharged home.  she was informed of her diagnosis and instructed to come back with any concerns or worsening of condition.  she and was agreeable to the  plan as discussed above.  she was given the opportunity to ask questions.  All of the patient's questions were answered.    Procedures   Procedures    Patient was seen independently    Dora Rider MD  Emergency Medicine                                                       Dora Rider MD  06/24/25 1920       Dora Rider MD  06/24/25 1921

## 2025-06-25 ENCOUNTER — HOSPITAL ENCOUNTER (EMERGENCY)
Facility: HOSPITAL | Age: 40
Discharge: HOME | End: 2025-06-25
Attending: STUDENT IN AN ORGANIZED HEALTH CARE EDUCATION/TRAINING PROGRAM
Payer: COMMERCIAL

## 2025-06-25 ENCOUNTER — TELEPHONE (OUTPATIENT)
Dept: PRIMARY CARE | Facility: CLINIC | Age: 40
End: 2025-06-25

## 2025-06-25 ENCOUNTER — APPOINTMENT (OUTPATIENT)
Dept: RADIOLOGY | Facility: HOSPITAL | Age: 40
End: 2025-06-25
Payer: COMMERCIAL

## 2025-06-25 VITALS
SYSTOLIC BLOOD PRESSURE: 164 MMHG | RESPIRATION RATE: 18 BRPM | HEART RATE: 77 BPM | OXYGEN SATURATION: 97 % | DIASTOLIC BLOOD PRESSURE: 108 MMHG | TEMPERATURE: 98.1 F | WEIGHT: 181.66 LBS | HEIGHT: 63 IN | BODY MASS INDEX: 32.19 KG/M2

## 2025-06-25 DIAGNOSIS — I10 PRIMARY HYPERTENSION: Primary | ICD-10-CM

## 2025-06-25 DIAGNOSIS — I10 HYPERTENSION, UNSPECIFIED TYPE: Primary | ICD-10-CM

## 2025-06-25 DIAGNOSIS — R51.9 NONINTRACTABLE HEADACHE, UNSPECIFIED CHRONICITY PATTERN, UNSPECIFIED HEADACHE TYPE: ICD-10-CM

## 2025-06-25 LAB
ALBUMIN SERPL BCP-MCNC: 4 G/DL (ref 3.4–5)
ALP SERPL-CCNC: 39 U/L (ref 33–110)
ALT SERPL W P-5'-P-CCNC: 19 U/L (ref 7–45)
ANION GAP SERPL CALCULATED.3IONS-SCNC: 9 MMOL/L (ref 10–20)
AST SERPL W P-5'-P-CCNC: 12 U/L (ref 9–39)
BASOPHILS # BLD AUTO: 0.02 X10*3/UL (ref 0–0.1)
BASOPHILS NFR BLD AUTO: 0.4 %
BILIRUB SERPL-MCNC: 0.3 MG/DL (ref 0–1.2)
BUN SERPL-MCNC: 14 MG/DL (ref 6–23)
CALCIUM SERPL-MCNC: 9 MG/DL (ref 8.6–10.3)
CHLORIDE SERPL-SCNC: 105 MMOL/L (ref 98–107)
CO2 SERPL-SCNC: 29 MMOL/L (ref 21–32)
CREAT SERPL-MCNC: 0.68 MG/DL (ref 0.5–1.05)
EGFRCR SERPLBLD CKD-EPI 2021: >90 ML/MIN/1.73M*2
EOSINOPHIL # BLD AUTO: 0.06 X10*3/UL (ref 0–0.7)
EOSINOPHIL NFR BLD AUTO: 1.1 %
ERYTHROCYTE [DISTWIDTH] IN BLOOD BY AUTOMATED COUNT: 15.8 % (ref 11.5–14.5)
GLUCOSE SERPL-MCNC: 87 MG/DL (ref 74–99)
HCT VFR BLD AUTO: 33.9 % (ref 36–46)
HGB BLD-MCNC: 11.3 G/DL (ref 12–16)
IMM GRANULOCYTES # BLD AUTO: 0.01 X10*3/UL (ref 0–0.7)
IMM GRANULOCYTES NFR BLD AUTO: 0.2 % (ref 0–0.9)
LYMPHOCYTES # BLD AUTO: 2.67 X10*3/UL (ref 1.2–4.8)
LYMPHOCYTES NFR BLD AUTO: 49.7 %
MAGNESIUM SERPL-MCNC: 1.89 MG/DL (ref 1.6–2.4)
MCH RBC QN AUTO: 34.1 PG (ref 26–34)
MCHC RBC AUTO-ENTMCNC: 33.3 G/DL (ref 32–36)
MCV RBC AUTO: 102 FL (ref 80–100)
MONOCYTES # BLD AUTO: 0.4 X10*3/UL (ref 0.1–1)
MONOCYTES NFR BLD AUTO: 7.4 %
NEUTROPHILS # BLD AUTO: 2.21 X10*3/UL (ref 1.2–7.7)
NEUTROPHILS NFR BLD AUTO: 41.2 %
NRBC BLD-RTO: 0 /100 WBCS (ref 0–0)
PLATELET # BLD AUTO: 257 X10*3/UL (ref 150–450)
POTASSIUM SERPL-SCNC: 3.2 MMOL/L (ref 3.5–5.3)
PROT SERPL-MCNC: 6.6 G/DL (ref 6.4–8.2)
RBC # BLD AUTO: 3.31 X10*6/UL (ref 4–5.2)
SODIUM SERPL-SCNC: 140 MMOL/L (ref 136–145)
WBC # BLD AUTO: 5.4 X10*3/UL (ref 4.4–11.3)

## 2025-06-25 PROCEDURE — 36415 COLL VENOUS BLD VENIPUNCTURE: CPT | Performed by: PHYSICIAN ASSISTANT

## 2025-06-25 PROCEDURE — 2500000002 HC RX 250 W HCPCS SELF ADMINISTERED DRUGS (ALT 637 FOR MEDICARE OP, ALT 636 FOR OP/ED): Performed by: PHYSICIAN ASSISTANT

## 2025-06-25 PROCEDURE — 70450 CT HEAD/BRAIN W/O DYE: CPT

## 2025-06-25 PROCEDURE — 70450 CT HEAD/BRAIN W/O DYE: CPT | Performed by: RADIOLOGY

## 2025-06-25 PROCEDURE — 96361 HYDRATE IV INFUSION ADD-ON: CPT | Performed by: PHYSICIAN ASSISTANT

## 2025-06-25 PROCEDURE — 83735 ASSAY OF MAGNESIUM: CPT | Performed by: PHYSICIAN ASSISTANT

## 2025-06-25 PROCEDURE — 96374 THER/PROPH/DIAG INJ IV PUSH: CPT | Performed by: PHYSICIAN ASSISTANT

## 2025-06-25 PROCEDURE — 2500000004 HC RX 250 GENERAL PHARMACY W/ HCPCS (ALT 636 FOR OP/ED): Mod: JZ | Performed by: PHYSICIAN ASSISTANT

## 2025-06-25 PROCEDURE — 85025 COMPLETE CBC W/AUTO DIFF WBC: CPT | Performed by: PHYSICIAN ASSISTANT

## 2025-06-25 PROCEDURE — 99284 EMERGENCY DEPT VISIT MOD MDM: CPT | Mod: 25 | Performed by: STUDENT IN AN ORGANIZED HEALTH CARE EDUCATION/TRAINING PROGRAM

## 2025-06-25 PROCEDURE — 96375 TX/PRO/DX INJ NEW DRUG ADDON: CPT | Performed by: PHYSICIAN ASSISTANT

## 2025-06-25 PROCEDURE — 2500000001 HC RX 250 WO HCPCS SELF ADMINISTERED DRUGS (ALT 637 FOR MEDICARE OP): Performed by: PHYSICIAN ASSISTANT

## 2025-06-25 PROCEDURE — 80053 COMPREHEN METABOLIC PANEL: CPT | Performed by: PHYSICIAN ASSISTANT

## 2025-06-25 RX ORDER — PROCHLORPERAZINE EDISYLATE 5 MG/ML
10 INJECTION INTRAMUSCULAR; INTRAVENOUS ONCE
Status: COMPLETED | OUTPATIENT
Start: 2025-06-25 | End: 2025-06-25

## 2025-06-25 RX ORDER — AMLODIPINE BESYLATE 5 MG/1
5 TABLET ORAL ONCE
Status: COMPLETED | OUTPATIENT
Start: 2025-06-25 | End: 2025-06-25

## 2025-06-25 RX ORDER — DEXAMETHASONE SODIUM PHOSPHATE 10 MG/ML
10 INJECTION INTRAMUSCULAR; INTRAVENOUS ONCE
Status: COMPLETED | OUTPATIENT
Start: 2025-06-25 | End: 2025-06-25

## 2025-06-25 RX ORDER — AMLODIPINE BESYLATE 5 MG/1
5 TABLET ORAL DAILY
Qty: 7 TABLET | Refills: 0 | Status: SHIPPED | OUTPATIENT
Start: 2025-06-25 | End: 2025-06-28 | Stop reason: SDUPTHER

## 2025-06-25 RX ORDER — DIPHENHYDRAMINE HYDROCHLORIDE 50 MG/ML
25 INJECTION, SOLUTION INTRAMUSCULAR; INTRAVENOUS ONCE
Status: COMPLETED | OUTPATIENT
Start: 2025-06-25 | End: 2025-06-25

## 2025-06-25 RX ORDER — KETOROLAC TROMETHAMINE 15 MG/ML
15 INJECTION, SOLUTION INTRAMUSCULAR; INTRAVENOUS ONCE
Status: COMPLETED | OUTPATIENT
Start: 2025-06-25 | End: 2025-06-25

## 2025-06-25 RX ORDER — POTASSIUM CHLORIDE 20 MEQ/1
40 TABLET, EXTENDED RELEASE ORAL ONCE
Status: COMPLETED | OUTPATIENT
Start: 2025-06-25 | End: 2025-06-25

## 2025-06-25 RX ADMIN — AMLODIPINE BESYLATE 5 MG: 5 TABLET ORAL at 22:35

## 2025-06-25 RX ADMIN — SODIUM CHLORIDE 1000 ML: 900 INJECTION, SOLUTION INTRAVENOUS at 21:12

## 2025-06-25 RX ADMIN — DIPHENHYDRAMINE HYDROCHLORIDE 25 MG: 50 INJECTION, SOLUTION INTRAMUSCULAR; INTRAVENOUS at 21:35

## 2025-06-25 RX ADMIN — DEXAMETHASONE SODIUM PHOSPHATE 10 MG: 10 INJECTION INTRAMUSCULAR; INTRAVENOUS at 21:34

## 2025-06-25 RX ADMIN — POTASSIUM CHLORIDE 40 MEQ: 1500 TABLET, EXTENDED RELEASE ORAL at 21:34

## 2025-06-25 RX ADMIN — KETOROLAC TROMETHAMINE 15 MG: 15 INJECTION, SOLUTION INTRAMUSCULAR; INTRAVENOUS at 21:57

## 2025-06-25 RX ADMIN — PROCHLORPERAZINE EDISYLATE 10 MG: 5 INJECTION INTRAMUSCULAR; INTRAVENOUS at 21:34

## 2025-06-25 ASSESSMENT — PAIN SCALES - GENERAL
PAINLEVEL_OUTOF10: 4
PAINLEVEL_OUTOF10: 5 - MODERATE PAIN

## 2025-06-25 ASSESSMENT — PAIN DESCRIPTION - FREQUENCY: FREQUENCY: CONSTANT/CONTINUOUS

## 2025-06-25 ASSESSMENT — PAIN - FUNCTIONAL ASSESSMENT
PAIN_FUNCTIONAL_ASSESSMENT: 0-10
PAIN_FUNCTIONAL_ASSESSMENT: 0-10

## 2025-06-25 ASSESSMENT — PAIN DESCRIPTION - PAIN TYPE: TYPE: ACUTE PAIN

## 2025-06-25 ASSESSMENT — PAIN DESCRIPTION - ONSET: ONSET: SUDDEN

## 2025-06-25 ASSESSMENT — PAIN DESCRIPTION - DESCRIPTORS: DESCRIPTORS: PRESSURE

## 2025-06-25 ASSESSMENT — PAIN DESCRIPTION - LOCATION: LOCATION: HEAD

## 2025-06-25 ASSESSMENT — PAIN DESCRIPTION - PROGRESSION: CLINICAL_PROGRESSION: NOT CHANGED

## 2025-06-26 ENCOUNTER — TELEMEDICINE (OUTPATIENT)
Dept: PRIMARY CARE | Facility: CLINIC | Age: 40
End: 2025-06-26
Payer: COMMERCIAL

## 2025-06-26 ENCOUNTER — TELEPHONE (OUTPATIENT)
Dept: PRIMARY CARE | Facility: CLINIC | Age: 40
End: 2025-06-26
Payer: COMMERCIAL

## 2025-06-26 DIAGNOSIS — Z85.850 HISTORY OF THYROID CANCER: ICD-10-CM

## 2025-06-26 DIAGNOSIS — I10 PRIMARY HYPERTENSION: Primary | ICD-10-CM

## 2025-06-26 DIAGNOSIS — E89.0 HYPOTHYROIDISM, POSTSURGICAL: ICD-10-CM

## 2025-06-26 PROCEDURE — 99214 OFFICE O/P EST MOD 30 MIN: CPT | Performed by: NURSE PRACTITIONER

## 2025-06-26 RX ORDER — HYDRALAZINE HYDROCHLORIDE 25 MG/1
25 TABLET, FILM COATED ORAL 2 TIMES DAILY PRN
Qty: 60 TABLET | Refills: 0 | Status: SHIPPED | OUTPATIENT
Start: 2025-06-26

## 2025-06-26 ASSESSMENT — ENCOUNTER SYMPTOMS
SWEATS: 1
HEADACHES: 1
SWEATS: 1
HEADACHES: 1
HYPERTENSION: 1
HYPERTENSION: 1

## 2025-06-26 NOTE — PROGRESS NOTES
Subjective   Patient ID: Zuleyma Farrell is a 40 y.o. female who presents for No chief complaint on file..    This is a virtual visit with video.  Patient has requested and is agreeable to be on video. I performed this visit using real-time telehealth tools; including audio/video connection between the pt. and I at [facility].     I scheduled her for acute visit HTN  HTN- min stress. 150-180/100-110, HA, dizziness, occasional nausea. Lisinopril 40 mg po every day.  Went to the ED x 2 in the past 2 days. They started her on amlodipine 5 mg po every day.   HX thyroid Cancer- her TSH has been very labile.   CT scan enlarged LN in neck. ON and off sore throats.  She says the lymph nodes are large and she can feel them. Pain in LT axilla.   IMPRESSION:  The neck has bilateral level 2 borderline enlarged lymph nodes with  smaller right side level 5 nodes.    She is having pitting edema bilat LE.   Chronic opiate- she states if she takes this she gets the edema. This is RX my Dr Graham.       Hypertension  This is a recurrent problem. The current episode started more than 1 month ago. The problem has been rapidly worsening since onset. The problem is uncontrolled. Associated symptoms include anxiety, headaches, malaise/fatigue, peripheral edema and sweats. Agents associated with hypertension include NSAIDs and thyroid hormones. Risk factors for coronary artery disease include obesity and smoking/tobacco exposure. There are no compliance problems.         Review of Systems   Constitutional:  Positive for malaise/fatigue.   Neurological:  Positive for headaches.       Objective   There were no vitals taken for this visit.    Physical Exam  Constitutional:       Appearance: Normal appearance. She is obese.   Neurological:      Mental Status: She is alert.         Assessment/Plan   Diagnoses and all orders for this visit:  Primary hypertension  -     hydrALAZINE (Apresoline) 25 mg tablet; Take 1 tablet (25 mg) by  mouth 2 times a day as needed (SBP >150 or DBP >90).  History of thyroid cancer  -     Thyroglobulin and Antithyroglobulin; Future  Hypothyroidism, postsurgical  -     Thyroglobulin and Antithyroglobulin; Future    I think the HTN is related to the uncontrolled thyroid.   She has a HX of Thyroid cancer and should have her TG monitored q yr as well as keeping the TSH suppressed.     Her TSH is very labile without change in her T4 dose.     The cervical lymph nodes are concerning. The CT scan is form 11/2024 which did not show changes in remaining thyroid tissue however This may need to be looked into further.     I think endocrinology should be monitoring her on an ongoing basis. She has multiple health concerns. We need to make sure her thyroid is managed appropriately to decrease her risk of return. Consult Dr SHABBIR Boyer    This was d/w pt at length    She will call me early next week with an update; ED if HTN remains elevated and symptomatic. I do think her anxiety is playing into this.

## 2025-06-26 NOTE — DISCHARGE INSTRUCTIONS
"Thank you for allowing us to take care of you today. While you are home, you might receive a survey about your care in our hospital. Your nurse and myself, Hermilo Cardoso PA-C, would love your honest feedback on how we can improve your care. Your feedback and especially your positive comments help our hospital receive the support we need to continue to serve you and your family. Thank you again for trusting us with your care.\"    Be sure to follow up as directed in 1-2 days.  All of the details of your follow up instructions are detailed in the follow up section of this packet.     Go to your appointment as you have indicated is already scheduled for tomorrow.  If you develop different worsening pains or symptoms return to the emergency department.  I have given you a 1 week prescription of an additional blood pressure medication to take in addition to your current medications.      It is important to remember that your care does not end here and you must continue to monitor your condition closely. Please return to the emergency department for any worsening or concerning signs or symptoms as directed by our conversations and the discharge instructions. Otherwise please follow up with your doctor in 2 days if no better or worse. If you do not have a doctor please contact the referral number on your discharge instructions. Please contact any physician specialists provided in your discharge notes as it is very important to follow up with them regarding your condition. If you are unable to reach the physicians provided, please come back to the Emergency Department at any time.        Return to emergency room without delay for ANY new or worsening pains or for any other symptoms or concerns.      "

## 2025-06-26 NOTE — Clinical Note
I am consulting you on this pt. HX thyroid cancer. Labile high TSH, cervical lymph nodes. I ordered  a TG. Let em know if you want anything else ordered before you see her. Thank you!

## 2025-06-26 NOTE — ED PROVIDER NOTES
HPI   Chief Complaint   Patient presents with    Hypertension     Pt blood pressure has been high. Pt takes 20 mg lisinopril daily. Pt was told by her PCP to tale another lisinopril, so pt has had 40mg. Pt states her BP went up from there.       HPI  Patient 40-year-old female coming in for evaluation of high blood pressure, takes 20 mg of lisinopril daily, she says that earlier this afternoon she started feeling a little bit of a headache, and described as a throbbing sensation, checked her blood pressure blood pressures been running high, called PCP office and they recommended that she take an additional 20 mg of lisinopril.  Patient has had a total of 40 mg lisinopril today.  Also takes 25 mg of metoprolol daily.  She is taking all her medications as recommended by her doctor, states that she is still not feeling normal and states that she has a throbbing in her head.  The patient states that she was seen here in the emergency department yesterday for more of a dizziness situation, she says that her symptoms today are different than her symptoms yesterday.  The patient denies any fall injury or trauma.  No numbness or tingling, no chest pain or shortness of breath.      Patient History   Medical History[1]  Surgical History[2]  Family History[3]  Social History[4]    Physical Exam   ED Triage Vitals [06/25/25 2010]   Temperature Heart Rate Respirations BP   36.7 °C (98.1 °F) 87 16 (!) 190/115      Pulse Ox Temp Source Heart Rate Source Patient Position   99 % Temporal Monitor Sitting      BP Location FiO2 (%)     Right arm --       Physical Exam  PHYSICAL EXAMINATION    GENERAL APPEARANCE: Awake and alert.     VITAL SIGNS: As per the nurses' triage record.     HEENT: Normocephalic, atraumatic. Extraocular muscles are intact. Pupils equal round and reactive to light. Conjunctiva are pink. Negative scleral icterus. Mucous membranes are moist. Tongue in the midline. Pharynx was without erythema or exudates, uvula  midline    NECK: Soft Nontender and supple, full gross ROM, no meningeal signs.    CHEST: Nontender to palpation. Clear to auscultation bilaterally. No rales, rhonchi, or wheezing.     HEART: S1, S2. Regular rate and rhythm. No murmurs, gallops or rubs.  Strong and equal pulses in the extremities.     ABDOMEN: Soft, nontender, nondistended, positive bowel sounds, no palpable masses.    MUSCULOSKELETAL: The calves are nontender to palpation. Full gross active range of motion. Ambulating on own with no acute difficulties     NEUROLOGICAL: Awake, alert and oriented x 3. Power intact in the upper and lower extremities. Sensation is intact to light touch in the upper and lower extremities.     Interval Baseline; National Institutes of [employer] Stroke Scale Score  1a. LOC Alert, Keenly Responsive (0), 1b. LOC Questions Answers Both Questions Correctly (0), 1c. LOC Commands Performs Both Task Correctly (0), 2. Best Gaze Normal (0), 3. Visual No Visual Loss (0), 4. Facial Palsy Normal Symmetrical Movements (0), 5a. Motor Arm Left No Drift - Limb Holds 90 or 45 Degrees for Full 10 Seconds (0), 5b. Motor Arm Right No Drift - Limb Holds 90 or 45 Degrees for Full 10 Seconds (0), 6a. Motor Leg Left No Drift - Limb Holds 90 or 45 Degrees for Full 10 Seconds (0), 6b. Motor Leg Right No Drift - Limb Holds 90 or 45 Degrees for Full 10 Seconds (0), 7. Limb Ataxia Absent (0), 8. Sensory Normal - No Sensory Loss (0), 9. Best Language No Aphasia - Normal (0), 10. Dysarthria Normal (0), 11. Extinction and Inattention No Abnormality (0), Stroke Scale Total 0.    Additionally, the patient has a reassuring test of skew.    IMMUNOLOGICAL: No lymphatic streaking noted     DERM: No petechiae, rashes, or ecchymoses.    ED Course & MDM   ED Course as of 06/25/25 2224 Wed Jun 25, 2025 2128 POTASSIUM(!): 3.2  Supplementation ordered [AP]   2221 Complete resolution of headache, has appointment tomorrow morning with family doctor [AP]       ED Course User Index  [AP] Hermilo Cardoso PA-C         Diagnoses as of 06/25/25 2224   Hypertension, unspecified type   Nonintractable headache, unspecified chronicity pattern, unspecified headache type                 No data recorded     Jerry Coma Scale Score: 15 (06/25/25 2027 : Eleonora Brennan RN)                           Medical Decision Making  Parts of this chart have been completed using voice recognition software. Please excuse any errors of transcription.  My thought process and reason for plan has been formulated from the time that I saw the patient until the time of disposition and is not specific to one specific moment during their visit and furthermore my MDM encompasses this entire chart and not only this text box.      HPI: Detailed above.    Exam: A medically appropriate exam performed, outlined above, given the known history and presentation.    History Limited by: Nothing    History obtained from: The patient    External/internal records reviewed: No external record reviewed    Social Determinants of Health considered during this visit: Lives at home    Chronic conditions impacting care: Hypertension    Medications given during visit:  Medications   amLODIPine (Norvasc) tablet 5 mg (has no administration in time range)   sodium chloride 0.9 % bolus 1,000 mL (1,000 mL intravenous New Bag 6/25/25 2112)   diphenhydrAMINE (BENADryl) injection 25 mg (25 mg intravenous Given 6/25/25 2135)   prochlorperazine (Compazine) injection 10 mg (10 mg intravenous Given 6/25/25 2134)   dexAMETHasone (Decadron) injection 10 mg (10 mg intravenous Given 6/25/25 2134)   potassium chloride CR (Klor-Con M20) ER tablet 40 mEq (40 mEq oral Given 6/25/25 2134)   ketorolac (Toradol) injection 15 mg (15 mg intravenous Given 6/25/25 2157)        Diagnostic/tests  Labs Reviewed   CBC WITH AUTO DIFFERENTIAL - Abnormal       Result Value    WBC 5.4      nRBC 0.0      RBC 3.31 (*)     Hemoglobin 11.3 (*)     Hematocrit  33.9 (*)      (*)     MCH 34.1 (*)     MCHC 33.3      RDW 15.8 (*)     Platelets 257      Neutrophils % 41.2      Immature Granulocytes %, Automated 0.2      Lymphocytes % 49.7      Monocytes % 7.4      Eosinophils % 1.1      Basophils % 0.4      Neutrophils Absolute 2.21      Immature Granulocytes Absolute, Automated 0.01      Lymphocytes Absolute 2.67      Monocytes Absolute 0.40      Eosinophils Absolute 0.06      Basophils Absolute 0.02     COMPREHENSIVE METABOLIC PANEL - Abnormal    Glucose 87      Sodium 140      Potassium 3.2 (*)     Chloride 105      Bicarbonate 29      Anion Gap 9 (*)     Urea Nitrogen 14      Creatinine 0.68      eGFR >90      Calcium 9.0      Albumin 4.0      Alkaline Phosphatase 39      Total Protein 6.6      AST 12      Bilirubin, Total 0.3      ALT 19     MAGNESIUM - Normal    Magnesium 1.89        CT head wo IV contrast   Final Result   No discernible etiology for headache.        MACRO:   None                  Signed by: Brad Barnett 6/25/2025 9:32 PM   Dictation workstation:   [id number]             Case discussed with: ED attending    Considerations/further MDM:  Differential includes intracranial hemorrhage, stroke, CVA.  The patient has a stroke scale of 0, is neurologically intact with no evidence of neurovascular or neurologic compromise.    The patient's blood pressure has been somewhat fluctuant.  She takes metoprolol 25 and lisinopril 20.  She states that she did take a double dose of her lisinopril, I am not sure that this is controlling her blood pressure as appropriately as it could be therefore I believe initiating Norvasc is not unreasonable.  Out of concern for refractory hypertension patient already tells me that she has an appointment tomorrow morning with her family doctor.  The patient is advised to keep this appointment, and discuss whether or not continued long-term use of Norvasc would be most appropriate.    Patient is otherwise well-appearing,  initially had a mild headache which was completely resolved with medications, patient feels comfortable to home-going plan.    Seen in conjunction with attending physician who also feels discharge is reasonable at this time.      Procedure  Procedures       [1]   Past Medical History:  Diagnosis Date    Anxiety     Exertional shortness of breath 06/07/2023    Foot swelling 06/07/2023    History of cholecystectomy 01/19/2024    History of gestational diabetes mellitus (GDM) 01/19/2024    Hyperglycemia 01/19/2024    Kidney stones     Lupus     dx 09/2013    Mastodynia 06/07/2023    Other chest pain 01/10/2014    Atypical chest pain    Other conditions influencing health status 04/18/2016    Skin Lesion    Other muscle spasm 02/19/2016    Spasm of cervical paraspinous muscle    Pain in unspecified ankle and joints of unspecified foot 01/27/2015    Ankle joint pain    Pain in unspecified limb 07/29/2016    Limb pain    Pain in unspecified upper arm 07/29/2016    Pain in axilla    Pancreatitis (HHS-HCC) 01/19/2024    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    Personal history of diseases of the skin and subcutaneous tissue 02/20/2014    History of urticaria    Personal history of other diseases of the musculoskeletal system and connective tissue 12/31/2013    History of low back pain    Personal history of other diseases of the nervous system and sense organs 02/19/2016    History of tension headache    Personal history of other diseases of the respiratory system 02/11/2014    History of sinusitis    Personal history of other endocrine, nutritional and metabolic disease 01/27/2015    History of vitamin D deficiency    Personal history of other endocrine, nutritional and metabolic disease 05/02/2016    History of Hashimoto thyroiditis    Personal history of other endocrine, nutritional and metabolic disease 04/11/2016    History of hypokalemia    Personal history of other infectious and parasitic diseases  2016    History of herpes zoster    Personal history of other specified conditions 2014    History of urinary frequency    Personal history of other specified conditions 2021    History of abdominal pain    Personal history of other specified conditions 2021    History of nausea    Personal history of urinary (tract) infections 2014    History of urinary tract infection    Right upper quadrant pain 2013    Abdominal pain, RUQ (right upper quadrant)    Sleep disorder 2023    Strain of muscle, fascia and tendon at neck level, initial encounter 2016    Cervical strain    Swelling of both hands 2023    Tachycardia     Thyroid cancer (Multi)    [2]   Past Surgical History:  Procedure Laterality Date    BLADDER SUSPENSION  2021    bladder sling     SECTION, CLASSIC  06/27/2013    x3    CHOLECYSTECTOMY  2013    Cholecystectomy Laparoscopic    COLONOSCOPY  2021    GALLBLADDER SURGERY  2016    Gallbladder Surgery    HYSTERECTOMY  2022    OTHER SURGICAL HISTORY      GASTRO POLYPS REMOVED    OTHER SURGICAL HISTORY  2021    ABLATION    TOTAL THYROIDECTOMY  2016    Thyroid Surgery Total Thyroidectomy    TUBAL LIGATION  04/10/2013    Tubal Ligation   [3]   Family History  Problem Relation Name Age of Onset    Other (Other) Mother          Lichen sclerosus    Rheum arthritis Mother      Hypertension Mother     [4]   Social History  Tobacco Use    Smoking status: Every Day     Types: Cigarettes     Passive exposure: Current    Smokeless tobacco: Never   Vaping Use    Vaping status: Former    Passive vaping exposure: Yes   Substance Use Topics    Alcohol use: Not Currently    Drug use: Never        Hermilo Cardoso PA-C  [phone]

## 2025-06-26 NOTE — TELEPHONE ENCOUNTER
"Pt called on-call stating that her home BP is \"167/101\"  Pt reported that she called office previously about this and Debra increased her lisinopril dose, however BP remained elevated so pt was told to go to the ER  Pt states she went to the ER on 6/24/25 and \"they did nothing\" per pt.  She stated she called PCP office earlier today and was again instructed to take additional dose of lisinopril (totaling 40 mg)  but her BP is now \"167/101\"  She complains of dizziness, headache  She is concerned she is going to have a stroke despite denying any family hx of early ASCVD.   I have encouraged her to return to ER for fast acting medication   She verbalized understanding and will go to ER and f/u with office in am   "

## 2025-06-28 ENCOUNTER — HOSPITAL ENCOUNTER (EMERGENCY)
Facility: HOSPITAL | Age: 40
Discharge: HOME | End: 2025-06-28
Attending: STUDENT IN AN ORGANIZED HEALTH CARE EDUCATION/TRAINING PROGRAM
Payer: COMMERCIAL

## 2025-06-28 ENCOUNTER — TELEPHONE (OUTPATIENT)
Dept: PRIMARY CARE | Facility: CLINIC | Age: 40
End: 2025-06-28
Payer: COMMERCIAL

## 2025-06-28 VITALS
HEIGHT: 63 IN | TEMPERATURE: 97.7 F | RESPIRATION RATE: 16 BRPM | BODY MASS INDEX: 31.89 KG/M2 | SYSTOLIC BLOOD PRESSURE: 176 MMHG | WEIGHT: 180 LBS | DIASTOLIC BLOOD PRESSURE: 104 MMHG | HEART RATE: 78 BPM | OXYGEN SATURATION: 99 %

## 2025-06-28 DIAGNOSIS — I10 ASYMPTOMATIC HYPERTENSION: Primary | ICD-10-CM

## 2025-06-28 DIAGNOSIS — I10 HYPERTENSION, UNSPECIFIED TYPE: ICD-10-CM

## 2025-06-28 PROCEDURE — 99281 EMR DPT VST MAYX REQ PHY/QHP: CPT | Performed by: STUDENT IN AN ORGANIZED HEALTH CARE EDUCATION/TRAINING PROGRAM

## 2025-06-28 RX ORDER — AMLODIPINE BESYLATE 5 MG/1
10 TABLET ORAL DAILY
Qty: 14 TABLET | Refills: 0 | Status: SHIPPED | OUTPATIENT
Start: 2025-06-28 | End: 2025-07-05

## 2025-06-28 RX ORDER — CLONIDINE HYDROCHLORIDE 0.2 MG/1
0.2 TABLET ORAL 2 TIMES DAILY
Qty: 14 TABLET | Refills: 0 | Status: SHIPPED | OUTPATIENT
Start: 2025-06-28 | End: 2025-07-05

## 2025-06-28 ASSESSMENT — LIFESTYLE VARIABLES
HAVE YOU EVER FELT YOU SHOULD CUT DOWN ON YOUR DRINKING: NO
HAVE PEOPLE ANNOYED YOU BY CRITICIZING YOUR DRINKING: NO
EVER FELT BAD OR GUILTY ABOUT YOUR DRINKING: NO
TOTAL SCORE: 0
EVER HAD A DRINK FIRST THING IN THE MORNING TO STEADY YOUR NERVES TO GET RID OF A HANGOVER: NO

## 2025-06-28 ASSESSMENT — PAIN DESCRIPTION - PAIN TYPE: TYPE: ACUTE PAIN

## 2025-06-28 ASSESSMENT — PAIN - FUNCTIONAL ASSESSMENT: PAIN_FUNCTIONAL_ASSESSMENT: 0-10

## 2025-06-28 ASSESSMENT — PAIN DESCRIPTION - ORIENTATION: ORIENTATION: LEFT

## 2025-06-28 ASSESSMENT — PAIN SCALES - GENERAL
PAINLEVEL_OUTOF10: 0 - NO PAIN
PAINLEVEL_OUTOF10: 4

## 2025-06-28 ASSESSMENT — PAIN DESCRIPTION - LOCATION: LOCATION: HEAD

## 2025-06-28 ASSESSMENT — PAIN DESCRIPTION - DESCRIPTORS: DESCRIPTORS: PRESSURE

## 2025-06-28 NOTE — ED TRIAGE NOTES
Pt presented to the ED with c/o HTN and HA. Pt states over the past week her BP has been increased and seen in the ER for this with no improvement. PCP increased BP medications but have no helped. Pt is having L sided HA and states it feels like pressure but no visual changes at thins time. Pt has also has had intermitted CP on the left side and states it feels like a shooting pain.

## 2025-06-29 NOTE — ED PROVIDER NOTES
Emergency Department Provider Note       History of Present Illness     History provided by: Patient  Limitations to History: None  External Records Reviewed with Brief Summary: None    HPI:  Zuleyma Farrell is a 40 y.o. female presenting for hypertension.  The patient has had uncontrolled high blood pressure over the past 1 to 2 weeks.  Patient has been seen by primary care as well as outside emergency department multiple times for asymptomatic hypertension.  She has received multiple changes to her antihypertensive medication including addition of hydralazine as well as increased titration of dosing.  The patient has had mild headache but denies any additional symptoms.    Physical Exam   Triage vitals:  T 36.4 °C (97.5 °F)  HR 79  BP (!) 175/107  RR 18  O2 99 % None (Room air)    NIH is 0, cranial nerves II through XII are intact, normal strength and sensation in bilateral upper and lower extremities, able to ambulate without difficulty, normal finger-nose without signs of ataxia, normal Romberg. Clear lung sounds auscultation, normal S1 and S2 on cardiac auscultation      Medical Decision Making & ED Course   Medical Decision Makin y.o. female presenting for hypertension.  Patient presents with asymptomatic hypertension.  Was offered Tylenol Motrin but declined.  The patient was educated with regards to symptomatic hypertension and monitoring of symptoms.  Already being titrated on outpatient basis and I think it would provide additional harm by lowering the blood pressure temporarily here in the emergency department.  Given the normal neurologic examination and less concerned about acute stroke  ----      Differential diagnoses considered include but are not limited to: Please see MDM    Social Determinants of Health which Significantly Impact Care: Social Determinants of Health which Significantly Impact Care: None identified     EKG Independent Interpretation: EKG not obtained    Independent Result  Review and Interpretation: None obtained    Chronic conditions affecting the patient's care: As documented above in [facility]    The patient was discussed with the following consultants/services: None    Care Considerations: As documented above in [facility]    ED Course:  Diagnoses as of 06/28/25 2230   Asymptomatic hypertension       Disposition   As a result of the work-up, the patient was discharged home.  she was informed of her diagnosis and instructed to come back with any concerns or worsening of condition.  she and was agreeable to the plan as discussed above.  she was given the opportunity to ask questions.  All of the patient's questions were answered.    Procedures   Procedures        Aaron Gomez MD  Emergency Medicine                                                       Aaron Gomez MD  06/28/25 2234

## 2025-06-29 NOTE — TELEPHONE ENCOUNTER
"On-call note:  Pts 3rd page today    She states she went to [facility] ER and \"they did nothing for her BP\"   ER note reviewed, BP was \"175/107\"  ER MD felt \"it would provide additional harm by lowering the blood pressure temporarily here in the emergency department\"    I have sent in Rx for Clonidine and increased the Amlodipine to 10 mg and instructed pt on checking her BP  She is currently Rx:  Lisinopril 40 mg daily  Amlodipine 10 mg daily  Hydralazine 25 mg twice daily  Clonidine 0.2 mg twice daily      Pt has appt on 7/3/25 with PCP     "

## 2025-06-29 NOTE — TELEPHONE ENCOUNTER
"On-call note:   Pt called again regarding her BP  States it is \"169/110\"  She has a headache, dizziness and her hand is numb  She told me she has taken a total of 100 mg of hydralazine today  I instructed pt to go to ER d/t symptomatic hypertension.  Pt agreed with instructions   "

## 2025-06-29 NOTE — TELEPHONE ENCOUNTER
"On-call note:  Pt called stating her BP was elevated \"159/101\"  I spoke with pt earlier in the week regarding her BP and she did f/u with her PCP who gave her hydralazine 25 mg to take twice daily PRN.  Pt states she took 1 tab at 4 am today  I reviewed max dosing with pt and encouraged her to take an additional 25 mg every 2 hours up to 100 mg of hydralazine until her BP is < 140/80  Pt verbalized understanding and will f/u with PCP next week      "

## 2025-06-30 RX ORDER — CLONIDINE HYDROCHLORIDE 0.2 MG/1
0.2 TABLET ORAL 2 TIMES DAILY
Qty: 60 TABLET | Refills: 5 | Status: SHIPPED | OUTPATIENT
Start: 2025-06-30 | End: 2025-07-03 | Stop reason: SDUPTHER

## 2025-07-02 ENCOUNTER — PATIENT MESSAGE (OUTPATIENT)
Facility: CLINIC | Age: 40
End: 2025-07-02
Payer: COMMERCIAL

## 2025-07-02 DIAGNOSIS — M06.09 POLYARTHRITIS WITH NEGATIVE RHEUMATOID FACTOR (MULTI): ICD-10-CM

## 2025-07-02 DIAGNOSIS — G89.29 OTHER CHRONIC PAIN: ICD-10-CM

## 2025-07-02 RX ORDER — OXYCODONE AND ACETAMINOPHEN 7.5; 325 MG/1; MG/1
1 TABLET ORAL EVERY 4 HOURS PRN
Qty: 112 TABLET | Refills: 0 | Status: SHIPPED | OUTPATIENT
Start: 2025-07-02

## 2025-07-03 ENCOUNTER — APPOINTMENT (OUTPATIENT)
Dept: PRIMARY CARE | Facility: CLINIC | Age: 40
End: 2025-07-03
Payer: COMMERCIAL

## 2025-07-03 VITALS
HEIGHT: 63 IN | DIASTOLIC BLOOD PRESSURE: 65 MMHG | OXYGEN SATURATION: 96 % | BODY MASS INDEX: 30.3 KG/M2 | SYSTOLIC BLOOD PRESSURE: 127 MMHG | HEART RATE: 87 BPM | WEIGHT: 171 LBS

## 2025-07-03 DIAGNOSIS — E89.0 HYPOTHYROIDISM, POSTSURGICAL: ICD-10-CM

## 2025-07-03 DIAGNOSIS — I10 PRIMARY HYPERTENSION: Primary | ICD-10-CM

## 2025-07-03 DIAGNOSIS — Z85.850 HISTORY OF THYROID CANCER: ICD-10-CM

## 2025-07-03 PROCEDURE — 3008F BODY MASS INDEX DOCD: CPT | Performed by: NURSE PRACTITIONER

## 2025-07-03 PROCEDURE — 3078F DIAST BP <80 MM HG: CPT | Performed by: NURSE PRACTITIONER

## 2025-07-03 PROCEDURE — 99214 OFFICE O/P EST MOD 30 MIN: CPT | Performed by: NURSE PRACTITIONER

## 2025-07-03 PROCEDURE — 3074F SYST BP LT 130 MM HG: CPT | Performed by: NURSE PRACTITIONER

## 2025-07-03 RX ORDER — CLONIDINE HYDROCHLORIDE 0.2 MG/1
0.2 TABLET ORAL 2 TIMES DAILY
Qty: 60 TABLET | Refills: 5 | Status: SHIPPED | OUTPATIENT
Start: 2025-07-03 | End: 2025-12-30

## 2025-07-03 RX ORDER — LISINOPRIL 20 MG/1
40 TABLET ORAL DAILY
Qty: 60 TABLET | Refills: 5 | Status: SHIPPED | OUTPATIENT
Start: 2025-07-03

## 2025-07-03 ASSESSMENT — ENCOUNTER SYMPTOMS
DEPRESSION: 0
HYPERTENSION: 1
OCCASIONAL FEELINGS OF UNSTEADINESS: 0
HEADACHES: 1
SWEATS: 1
LOSS OF SENSATION IN FEET: 0

## 2025-07-03 ASSESSMENT — PATIENT HEALTH QUESTIONNAIRE - PHQ9
SUM OF ALL RESPONSES TO PHQ9 QUESTIONS 1 AND 2: 0
2. FEELING DOWN, DEPRESSED OR HOPELESS: NOT AT ALL
1. LITTLE INTEREST OR PLEASURE IN DOING THINGS: NOT AT ALL

## 2025-07-03 NOTE — PROGRESS NOTES
"Subjective   Patient ID: Zuleyma Farrell is a 40 y.o. female who presents for Follow-up (Patient is here today  for a blood pressure check. ).    40 year old female here for follow up HTN  HTN- on BB, ACE-I, Hydralazine PRN, clonidine 0.2mg.   Saw endo in June. Labs done. HTN in her office    Hypertension  This is a recurrent problem. The current episode started more than 1 month ago. The problem has been rapidly worsening since onset. The problem is uncontrolled. Associated symptoms include anxiety, headaches, malaise/fatigue, peripheral edema and sweats. Agents associated with hypertension include NSAIDs and thyroid hormones. Risk factors for coronary artery disease include obesity and smoking/tobacco exposure. There are no compliance problems.         Review of Systems   Constitutional:  Positive for malaise/fatigue.   Neurological:  Positive for headaches.       Objective   /65   Pulse 87   Ht 1.6 m (5' 3\")   Wt 77.6 kg (171 lb)   SpO2 96%   BMI 30.29 kg/m²     Physical Exam  Constitutional:       Appearance: Normal appearance. She is obese.   Neck:      Comments: Thyroid absent- not palpable. No nodules palpated  Cardiovascular:      Rate and Rhythm: Normal rate and regular rhythm.      Pulses: Normal pulses.      Heart sounds: Normal heart sounds.   Pulmonary:      Effort: Pulmonary effort is normal.      Breath sounds: Normal breath sounds.   Musculoskeletal:         General: Normal range of motion.      Cervical back: Normal range of motion and neck supple.   Lymphadenopathy:      Cervical: Cervical adenopathy (RT lateral anterior CLN palpable x 2) present.   Neurological:      General: No focal deficit present.      Mental Status: She is alert and oriented to person, place, and time.   Psychiatric:         Mood and Affect: Mood normal.         Behavior: Behavior normal.         Thought Content: Thought content normal.         Assessment/Plan   Diagnoses and all orders for this visit:  Primary " hypertension  Comments:  acute on Chronic.  maintain all current meds. get renal US since uncontrolled and very symptomatic. update labs for adrenal and thyroid  Orders:  -     lisinopril 20 mg tablet; Take 2 tablets (40 mg) by mouth once daily.  -     cloNIDine (Catapres) 0.2 mg tablet; Take 1 tablet (0.2 mg) by mouth 2 times a day.  -     US renal complete; Future  -     Prolactin level; Future  -     Cortisol; Future  -     ACTH; Future  History of thyroid cancer  Comments:  she should see endo  Orders:  -     Prolactin level; Future  -     Cortisol; Future  -     ACTH; Future  -     Thyroid Stimulating Hormone; Future  -     Thyroxine, Free; Future  -     Triiodothyronine, Free; Future  Hypothyroidism, postsurgical

## 2025-07-06 LAB
ACTH PLAS-MCNC: 15 PG/ML (ref 6–50)
CORTIS SERPL-MCNC: 10 MCG/DL
PROLACTIN SERPL-MCNC: 9.1 NG/ML
T3FREE SERPL-MCNC: 2.3 PG/ML (ref 2.3–4.2)
T4 FREE SERPL-MCNC: 1.7 NG/DL (ref 0.8–1.8)
TSH SERPL-ACNC: 5.15 MIU/L

## 2025-07-07 ENCOUNTER — TELEPHONE (OUTPATIENT)
Dept: PRIMARY CARE | Facility: CLINIC | Age: 40
End: 2025-07-07
Payer: COMMERCIAL

## 2025-07-07 DIAGNOSIS — Z85.850 HISTORY OF THYROID CANCER: Primary | ICD-10-CM

## 2025-07-07 DIAGNOSIS — R59.1 LYMPHADENOPATHY: ICD-10-CM

## 2025-07-07 DIAGNOSIS — R79.89 ABNORMAL TSH: ICD-10-CM

## 2025-07-07 NOTE — PROGRESS NOTES
Spoke to pt on the phone. Her TG is elevated, HX thyroidectomy for thyroid cancer. Has palpable lymph nodes in neck. Labile TSH, uncontrolled HTN requiring multiple medications acutely.   Will update US neck/soft tissue; PET scan

## 2025-07-08 ENCOUNTER — PATIENT MESSAGE (OUTPATIENT)
Dept: PRIMARY CARE | Facility: CLINIC | Age: 40
End: 2025-07-08
Payer: COMMERCIAL

## 2025-07-08 DIAGNOSIS — I10 UNCONTROLLED HYPERTENSION: Primary | ICD-10-CM

## 2025-07-08 DIAGNOSIS — I10 PRIMARY HYPERTENSION: Primary | ICD-10-CM

## 2025-07-11 ENCOUNTER — APPOINTMENT (OUTPATIENT)
Dept: RADIOLOGY | Facility: HOSPITAL | Age: 40
End: 2025-07-11
Payer: COMMERCIAL

## 2025-07-12 ENCOUNTER — HOSPITAL ENCOUNTER (EMERGENCY)
Facility: HOSPITAL | Age: 40
Discharge: HOME | End: 2025-07-12
Attending: EMERGENCY MEDICINE
Payer: COMMERCIAL

## 2025-07-12 ENCOUNTER — APPOINTMENT (OUTPATIENT)
Dept: CARDIOLOGY | Facility: HOSPITAL | Age: 40
End: 2025-07-12
Payer: COMMERCIAL

## 2025-07-12 VITALS
BODY MASS INDEX: 29.88 KG/M2 | TEMPERATURE: 98.4 F | HEART RATE: 77 BPM | WEIGHT: 168.65 LBS | DIASTOLIC BLOOD PRESSURE: 94 MMHG | HEIGHT: 63 IN | RESPIRATION RATE: 20 BRPM | SYSTOLIC BLOOD PRESSURE: 179 MMHG | OXYGEN SATURATION: 98 %

## 2025-07-12 DIAGNOSIS — E87.6 HYPOKALEMIA: ICD-10-CM

## 2025-07-12 DIAGNOSIS — F41.9 ANXIETY: ICD-10-CM

## 2025-07-12 DIAGNOSIS — F32.9 MAJOR DEPRESSIVE DISORDER WITH CURRENT ACTIVE EPISODE, UNSPECIFIED DEPRESSION EPISODE SEVERITY, UNSPECIFIED WHETHER RECURRENT: Primary | ICD-10-CM

## 2025-07-12 DIAGNOSIS — R45.851 SUICIDAL IDEATION: ICD-10-CM

## 2025-07-12 LAB
ALBUMIN SERPL BCP-MCNC: 4.2 G/DL (ref 3.4–5)
ALP SERPL-CCNC: 35 U/L (ref 33–110)
ALT SERPL W P-5'-P-CCNC: 12 U/L (ref 7–45)
AMPHETAMINES UR QL SCN: NORMAL
ANION GAP SERPL CALCULATED.3IONS-SCNC: 14 MMOL/L (ref 10–20)
APPEARANCE UR: CLEAR
AST SERPL W P-5'-P-CCNC: 10 U/L (ref 9–39)
BACTERIA #/AREA URNS AUTO: ABNORMAL /HPF
BARBITURATES UR QL SCN: NORMAL
BASOPHILS # BLD AUTO: 0.02 X10*3/UL (ref 0–0.1)
BASOPHILS NFR BLD AUTO: 0.3 %
BENZODIAZ UR QL SCN: NORMAL
BILIRUB SERPL-MCNC: 0.5 MG/DL (ref 0–1.2)
BILIRUB UR STRIP.AUTO-MCNC: ABNORMAL MG/DL
BUN SERPL-MCNC: 13 MG/DL (ref 6–23)
BZE UR QL SCN: NORMAL
CALCIUM SERPL-MCNC: 9.1 MG/DL (ref 8.6–10.3)
CANNABINOIDS UR QL SCN: NORMAL
CHLORIDE SERPL-SCNC: 105 MMOL/L (ref 98–107)
CO2 SERPL-SCNC: 20 MMOL/L (ref 21–32)
COLOR UR: YELLOW
CREAT SERPL-MCNC: 0.64 MG/DL (ref 0.5–1.05)
EGFRCR SERPLBLD CKD-EPI 2021: >90 ML/MIN/1.73M*2
EOSINOPHIL # BLD AUTO: 0.02 X10*3/UL (ref 0–0.7)
EOSINOPHIL NFR BLD AUTO: 0.3 %
ERYTHROCYTE [DISTWIDTH] IN BLOOD BY AUTOMATED COUNT: 15 % (ref 11.5–14.5)
ETHANOL SERPL-MCNC: <10 MG/DL
FENTANYL+NORFENTANYL UR QL SCN: NORMAL
GLUCOSE SERPL-MCNC: 103 MG/DL (ref 74–99)
GLUCOSE UR STRIP.AUTO-MCNC: NORMAL MG/DL
HCG UR QL IA.RAPID: NEGATIVE
HCT VFR BLD AUTO: 37.2 % (ref 36–46)
HGB BLD-MCNC: 13 G/DL (ref 12–16)
IMM GRANULOCYTES # BLD AUTO: 0.02 X10*3/UL (ref 0–0.7)
IMM GRANULOCYTES NFR BLD AUTO: 0.3 % (ref 0–0.9)
KETONES UR STRIP.AUTO-MCNC: ABNORMAL MG/DL
LEUKOCYTE ESTERASE UR QL STRIP.AUTO: NEGATIVE
LYMPHOCYTES # BLD AUTO: 1.27 X10*3/UL (ref 1.2–4.8)
LYMPHOCYTES NFR BLD AUTO: 16 %
MCH RBC QN AUTO: 34.9 PG (ref 26–34)
MCHC RBC AUTO-ENTMCNC: 34.9 G/DL (ref 32–36)
MCV RBC AUTO: 100 FL (ref 80–100)
METHADONE UR QL SCN: NORMAL
MONOCYTES # BLD AUTO: 0.33 X10*3/UL (ref 0.1–1)
MONOCYTES NFR BLD AUTO: 4.2 %
MUCOUS THREADS #/AREA URNS AUTO: ABNORMAL /LPF
NEUTROPHILS # BLD AUTO: 6.28 X10*3/UL (ref 1.2–7.7)
NEUTROPHILS NFR BLD AUTO: 78.9 %
NITRITE UR QL STRIP.AUTO: NEGATIVE
NRBC BLD-RTO: 0 /100 WBCS (ref 0–0)
OPIATES UR QL SCN: NORMAL
OXYCODONE+OXYMORPHONE UR QL SCN: NORMAL
PCP UR QL SCN: NORMAL
PH UR STRIP.AUTO: 6.5 [PH]
PLATELET # BLD AUTO: 320 X10*3/UL (ref 150–450)
POTASSIUM SERPL-SCNC: 3.2 MMOL/L (ref 3.5–5.3)
PROT SERPL-MCNC: 6.9 G/DL (ref 6.4–8.2)
PROT UR STRIP.AUTO-MCNC: ABNORMAL MG/DL
RBC # BLD AUTO: 3.72 X10*6/UL (ref 4–5.2)
RBC # UR STRIP.AUTO: NEGATIVE MG/DL
RBC #/AREA URNS AUTO: ABNORMAL /HPF
SODIUM SERPL-SCNC: 136 MMOL/L (ref 136–145)
SP GR UR STRIP.AUTO: 1.03
SQUAMOUS #/AREA URNS AUTO: ABNORMAL /HPF
TSH SERPL-ACNC: 1.17 MIU/L (ref 0.44–3.98)
UROBILINOGEN UR STRIP.AUTO-MCNC: ABNORMAL MG/DL
WBC # BLD AUTO: 7.9 X10*3/UL (ref 4.4–11.3)
WBC #/AREA URNS AUTO: ABNORMAL /HPF

## 2025-07-12 PROCEDURE — 84443 ASSAY THYROID STIM HORMONE: CPT | Performed by: EMERGENCY MEDICINE

## 2025-07-12 PROCEDURE — 82077 ASSAY SPEC XCP UR&BREATH IA: CPT | Performed by: EMERGENCY MEDICINE

## 2025-07-12 PROCEDURE — 99284 EMERGENCY DEPT VISIT MOD MDM: CPT | Performed by: EMERGENCY MEDICINE

## 2025-07-12 PROCEDURE — 80307 DRUG TEST PRSMV CHEM ANLYZR: CPT | Performed by: EMERGENCY MEDICINE

## 2025-07-12 PROCEDURE — 85025 COMPLETE CBC W/AUTO DIFF WBC: CPT | Performed by: EMERGENCY MEDICINE

## 2025-07-12 PROCEDURE — 36415 COLL VENOUS BLD VENIPUNCTURE: CPT | Performed by: EMERGENCY MEDICINE

## 2025-07-12 PROCEDURE — 80053 COMPREHEN METABOLIC PANEL: CPT | Performed by: EMERGENCY MEDICINE

## 2025-07-12 PROCEDURE — 81025 URINE PREGNANCY TEST: CPT | Performed by: EMERGENCY MEDICINE

## 2025-07-12 PROCEDURE — 2500000002 HC RX 250 W HCPCS SELF ADMINISTERED DRUGS (ALT 637 FOR MEDICARE OP, ALT 636 FOR OP/ED): Performed by: EMERGENCY MEDICINE

## 2025-07-12 PROCEDURE — 2500000001 HC RX 250 WO HCPCS SELF ADMINISTERED DRUGS (ALT 637 FOR MEDICARE OP): Performed by: EMERGENCY MEDICINE

## 2025-07-12 PROCEDURE — 93005 ELECTROCARDIOGRAM TRACING: CPT

## 2025-07-12 PROCEDURE — 81003 URINALYSIS AUTO W/O SCOPE: CPT | Performed by: EMERGENCY MEDICINE

## 2025-07-12 RX ORDER — POTASSIUM CHLORIDE 20 MEQ/1
40 TABLET, EXTENDED RELEASE ORAL ONCE
Status: COMPLETED | OUTPATIENT
Start: 2025-07-12 | End: 2025-07-12

## 2025-07-12 RX ORDER — HYDROXYZINE HYDROCHLORIDE 50 MG/1
50 TABLET, FILM COATED ORAL ONCE
Status: COMPLETED | OUTPATIENT
Start: 2025-07-12 | End: 2025-07-12

## 2025-07-12 RX ADMIN — POTASSIUM CHLORIDE 40 MEQ: 1500 TABLET, EXTENDED RELEASE ORAL at 12:46

## 2025-07-12 RX ADMIN — HYDROXYZINE HYDROCHLORIDE 50 MG: 50 TABLET ORAL at 09:18

## 2025-07-12 SDOH — HEALTH STABILITY: MENTAL HEALTH: HAVE YOU ACTUALLY HAD ANY THOUGHTS OF KILLING YOURSELF?: YES

## 2025-07-12 SDOH — HEALTH STABILITY: MENTAL HEALTH: BEHAVIORAL HEALTH(WDL): EXCEPTIONS TO WDL

## 2025-07-12 SDOH — HEALTH STABILITY: MENTAL HEALTH: NEEDS EXPRESSED: EMOTIONAL

## 2025-07-12 SDOH — HEALTH STABILITY: MENTAL HEALTH: HAVE YOU EVER DONE ANYTHING, STARTED TO DO ANYTHING, OR PREPARED TO DO ANYTHING TO END YOUR LIFE?: NO

## 2025-07-12 SDOH — HEALTH STABILITY: MENTAL HEALTH: FOR HIGH RISK PATIENTS: 1:1 PATIENT OBSERVER AT ALL TIMES

## 2025-07-12 SDOH — HEALTH STABILITY: MENTAL HEALTH

## 2025-07-12 SDOH — HEALTH STABILITY: MENTAL HEALTH: HAVE YOU BEEN THINKING ABOUT HOW YOU MIGHT DO THIS?: YES

## 2025-07-12 SDOH — HEALTH STABILITY: MENTAL HEALTH: SUICIDE ASSESSMENT: ADULT (C-SSRS)

## 2025-07-12 SDOH — HEALTH STABILITY: MENTAL HEALTH: BEHAVIORS/MOOD: SAD

## 2025-07-12 SDOH — HEALTH STABILITY: MENTAL HEALTH: HAVE YOU HAD THESE THOUGHTS AND HAD SOME INTENTION OF ACTING ON THEM?: YES

## 2025-07-12 SDOH — HEALTH STABILITY: MENTAL HEALTH
HAVE YOU STARTED TO WORK OUT OR WORKED OUT THE DETAILS OF HOW TO KILL YOURSELF? DO YOU INTENT TO CARRY OUT THIS PLAN?: NO

## 2025-07-12 SDOH — HEALTH STABILITY: MENTAL HEALTH: HAVE YOU WISHED YOU WERE DEAD OR WISHED YOU COULD GO TO SLEEP AND NOT WAKE UP?: YES

## 2025-07-12 ASSESSMENT — PAIN DESCRIPTION - PROGRESSION: CLINICAL_PROGRESSION: NOT CHANGED

## 2025-07-12 ASSESSMENT — PAIN SCALES - GENERAL: PAINLEVEL_OUTOF10: 0 - NO PAIN

## 2025-07-12 ASSESSMENT — PAIN - FUNCTIONAL ASSESSMENT: PAIN_FUNCTIONAL_ASSESSMENT: 0-10

## 2025-07-12 NOTE — ED PROVIDER NOTES
I assumed care of this patient at shift change, patient pending potential acceptance to psychiatric facility    Sections of this report were created using voice-to-text technology and may contain errors in translation    Charan Mccallum DO  Emergency Medicine

## 2025-07-12 NOTE — PROGRESS NOTES
Spiritual Care Visit  Spiritual Care Request    Reason for Visit:  Routine Visit: Introduction  Crisis Visit: ED     Request Received From:       Focus of Care:  Visited With: Patient         Refer to :          Spiritual Care Assessment    Spiritual Assessment:                      Care Provided:  Intended Effects: Build relationship of care and support, Convey a calming presence, Demonstrate caring and concern    Sense of Community and or Synagogue Affiliation:  Religion   Values/Beliefs  Spiritual Requests During Hospitalization: I gave Zuleyma a blessing.     Addressed Needs/Concerns and/or [place] Through:          Outcome:        Advance Directives:         Spiritual Care Annotation    Annotation:  I gae Calie a blessing today.  Tariq Thompson

## 2025-07-12 NOTE — ED PROVIDER NOTES
HPI   Chief Complaint   Patient presents with    Anxiety     I have multiple diagnosis and I feel overwhelmed and I feel sometimes my family would be better off without me        40-year-old female presents for anxiety and depression.  She has multiple medical diagnoses states she feels that her family including her  and children will be better off if she was not here and with someone who could do more for them.  States she takes Lexapro but it is not helping they tried adding Wellbutrin but she had bad side effects so unable to resume this medication.  Feeling increasingly depressed due to her condition now starting to have suicidal ideation with a plan to potentially shoot herself with a gun which they do have in the home and she has access to.  Denies any physical complaints aside from chronic autoimmune disorders and related symptoms.      History provided by:  Patient and medical records          Patient History   Medical History[1]  Surgical History[2]  Family History[3]  Social History[4]    Physical Exam   ED Triage Vitals [07/12/25 0851]   Temperature Heart Rate Respirations BP   36.9 °C (98.4 °F) 74 18 (!) 182/106      Pulse Ox Temp Source Heart Rate Source Patient Position   100 % Oral Monitor Sitting      BP Location FiO2 (%)     Left arm --       Physical Exam  Vitals and nursing note reviewed.     General: Vitals reviewed. Awake, alert, well-developed, well-nourished, NAD, tearful  HEENT: NC/AT, PERRL, MMM  Neck: Supple, trachea midline  Respiratory: No respiratory distress, lungs clear to auscultation bilaterally, no wheezes, rhonchi, or rales  CV: Regular rate and regular rhythm, no murmur/gallop/rubs  Abdomen/GI: Soft, non-tender, non-distended, no rebound, guarding, or rigidity, normal bowel sounds  Extremities: Moving all extremities, no deformities  Neuro: A/O, normal speech  Psych: Mood depressed, affect mood congruent, insight and judgment are fair, thought processes organized  Skin:  Warm, dry. No rashes identified      ED Course & MDM                  No data recorded     Jerry Coma Scale Score: 15 (07/12/25 0845 : Zack Zepeda, EMT)                           Medical Decision Making  Patient presents to emergency department for depression with suicidal ideation and plan.  No acute abnormality requiring treatment noted on physical exam.  All results obtained, reviewed, and interpreted, results compared with previous levels if available to evaluate for abnormality contributing to presentation. No findings to suggest metabolic or physiologic cause of psychiatric complaint. Patient medically cleared for psychiatric treatment.   consulted to evaluate the patient and recommends    Amount and/or Complexity of Data Reviewed  External Data Reviewed: notes.  Labs: ordered. Decision-making details documented in ED Course.  ECG/medicine tests: ordered and independent interpretation performed. Decision-making details documented in ED Course.        Procedure  Procedures         [1]   Past Medical History:  Diagnosis Date    Anemia 2002    Anxiety 2016    Arthritis 2016    Depression Not currently an issue    Disease of thyroid gland 2017?    Exertional shortness of breath 06/07/2023    Foot swelling 06/07/2023    GERD (gastroesophageal reflux disease) 2020    Headache 2016    History of cholecystectomy 01/19/2024    History of gestational diabetes mellitus (GDM) 01/19/2024    Hyperglycemia 01/19/2024    Hypertension 2024    Kidney stones     Lupus     dx 09/2013    Mastodynia 06/07/2023    Neuromuscular disorder (Multi) 2016    Other chest pain 01/10/2014    Atypical chest pain    Other conditions influencing health status 04/18/2016    Skin Lesion    Other muscle spasm 02/19/2016    Spasm of cervical paraspinous muscle    Pain in unspecified ankle and joints of unspecified foot 01/27/2015    Ankle joint pain    Pain in unspecified limb 07/29/2016    Limb pain    Pain in unspecified upper arm  2016    Pain in axilla    Pancreatitis ([facility]) 2024    Papillary microcarcinoma of thyroid (Multi) 2023    Personal history of diseases of the skin and subcutaneous tissue 2014    History of urticaria    Personal history of other diseases of the musculoskeletal system and connective tissue 2013    History of low back pain    Personal history of other diseases of the nervous system and sense organs 2016    History of tension headache    Personal history of other diseases of the respiratory system 2014    History of sinusitis    Personal history of other endocrine, nutritional and metabolic disease 2015    History of vitamin D deficiency    Personal history of other endocrine, nutritional and metabolic disease 2016    History of Hashimoto thyroiditis    Personal history of other endocrine, nutritional and metabolic disease 2016    History of hypokalemia    Personal history of other infectious and parasitic diseases 2016    History of herpes zoster    Personal history of other specified conditions 2014    History of urinary frequency    Personal history of other specified conditions 2021    History of abdominal pain    Personal history of other specified conditions 2021    History of nausea    Personal history of urinary (tract) infections 2014    History of urinary tract infection    Right upper quadrant pain 2013    Abdominal pain, RUQ (right upper quadrant)    Sleep disorder 2023    Strain of muscle, fascia and tendon at neck level, initial encounter 2016    Cervical strain    Swelling of both hands 2023    Tachycardia     Thyroid cancer (Multi)    [2]   Past Surgical History:  Procedure Laterality Date    BLADDER SUSPENSION  2021    bladder sling     SECTION, CLASSIC  06/27/2013    x3     SECTION, LOW TRANSVERSE  , ,     CHOLECYSTECTOMY  2013    Cholecystectomy  Laparoscopic    COLONOSCOPY  07/2021    GALLBLADDER SURGERY  01/21/2016    Gallbladder Surgery    HYSTERECTOMY  03/01/2022    OTHER SURGICAL HISTORY      GASTRO POLYPS REMOVED    OTHER SURGICAL HISTORY  02/2021    ABLATION    TOTAL THYROIDECTOMY  04/29/2016    Thyroid Surgery Total Thyroidectomy    TUBAL LIGATION  04/10/2013    Tubal Ligation   [3]   Family History  Problem Relation Name Age of Onset    Other (Other) Mother          Lichen sclerosus    Rheum arthritis Mother      Hypertension Mother      Arthritis Mother Dorothy hermosillo     Atrial fibrillation Mother Dorothy hermosillo     Anesthesia related problems Mother Dorothy hermosillo     Alcohol abuse Maternal Grandfather Chino Bates     Cancer Maternal Grandmother Madelyn Bates     Diabetes Maternal Grandmother Madelyn Bates     Colon cancer Maternal Grandmother Madelyn Bates 70 - 79    Drug abuse Mother's Brother Ricardo Bates    [4]   Social History  Tobacco Use    Smoking status: Every Day     Current packs/day: 1.00     Average packs/day: 1 pack/day for 24.3 years (24.3 ttl pk-yrs)     Types: Cigarettes     Start date: 4/1/2001     Passive exposure: Current    Smokeless tobacco: Never   Vaping Use    Vaping status: Former    Passive vaping exposure: Yes   Substance Use Topics    Alcohol use: Not Currently    Drug use: Never      use: Not Currently    Drug use: Never        Maddie Urrutia MD  07/14/25 6040

## 2025-07-12 NOTE — CONSULTS
EPAT Initial Psychiatry Consult    ASSESSMENT  Zuleyma Farrell is a 40 y.o. female with a past psychiatric history of MDD, ARANZA and a past medical history of thyroid cancer, anemia, arthritis, pancreatitis, unsp endocrine disease who presented to the [facility] ED  BIB self for suicidal thoughts. Patient was medically cleared and EPAT was consulted to complete an evaluation. On initial evaluation, patient reports she has been recently feeling very overwhelmed, as she has been told by her outpatient providers that there is a possibility her thyroid cancer has returned. She reports she has received this news a few weeks ago. She denies any suicidality currently, and reports she made the statements earlier 2/2 her anxiety, but has since had the chance to speak extensively with her mother and  who is at bedside, and feels reassured. She currently takes Lexapro 20mg. She denies any past SI/SA. Spoke to , who who reports that though patient has been having some difficulty processing the news about her medical conditions, he is not concerned for her safety. He denies any past suicidal statements. He reports that he will be changing passcodes to the safe in the house which contains guns.  Inpatient psychiatric admission was considered. Patient DOES NOT require involuntary psychiatric admission at this time. Patient declines voluntary admission. Patient was referred to  Urgent Care at [facility] and was agreeable with this plan. ED provider was informed of recommendation.    IMPRESSION  Major depressive disorder, recurrent, moderate   Generalized anxiety disorder    RECOMMENDATIONS  Safety:  - Patient does not currently meet criteria for inpatient psychiatric admission.   - Patient does not require a 1:1 sitter from a psychiatric perspective at this time.   - I have had a discussion with the patient about warning signs that their condition is worsening, and they should consider returning to the ED  "and/or calling 911. The patient has identified appropriate internal coping strategies and has people and social settings that provide distraction and support. The patient has a person who they can talk to in a crisis.  I have offered to contact this individual  Yes - spoke to  at bedside    Medications:  Continue Lexapro 20mg PO daily    Work-up:  Thyroid work up wnl  Hgb 13, WBC 7.9, Plt 320  UDS, Alc wnl    Follow-Up:  Patient plans to present to [facility] tomorrow for expedited outpatient  care.    Recommendations discussed with ED provider.  Patient  discussed with Dr. Maguire, who agrees with above plan.    Kathia Patel MD    ---------------------------------------------------------------------------------------------------------------    HISTORY OF PRESENT ILLNESS  Zuleyma Farrell is a 40 y.o. female with a past psychiatric history of MDD, ARANZA and a past medical history of thyroid cancer, anemia, arthritis, pancreatitis, unsp endocrine disease who presented to the [facility] ED  BIB self for suicidal thoughts. Patient was medically cleared and EPAT was consulted to complete an evaluation.    Per Triage: \"I have multiple diagnosis and I feel overwhelmed and I feel sometimes my family would be better off without me\"    Per ED Provider: \"40-year-old female presents for anxiety and depression. She has multiple medical diagnoses states she feels that her family including her  and children will be better off if she was not here and with someone who could do more for them. States she takes Lexapro but it is not helping they tried adding Wellbutrin but she had bad side effects so unable to resume this medication. Feeling increasingly depressed due to her condition now starting to have suicidal ideation with a plan to potentially shoot herself with a gun which they do have in the home and she has access to. Denies any physical complaints aside from chronic autoimmune disorders " "and related symptoms.\"    On interview: Patient reports she has been feeling very overwhelmed recently. Reports she has a hx of autoimmune conditions, which are difficult for her to manage. Reports she has had a thyroidectomy in the past for thyroid cancer, but has recently had a health scare and her doctors are concerned her cancer may have returned.   Reports she tends to \"keep things in\" which ends up overwhelming her. Has been speaking to her  and mother at bedside throughout the day, and feels much more reassured than when she initially came into the ED.   Reports she started taking Lexapro 20mg for ARANZA, social anxiety for about 3 years.   She reports she has guns in the house, \"if I wanted to I could've\" regarding suicide. She reports that though she made suicidal statements earlier today, she felt she made them due to feeling overwhelmed, but had no intent to go through with a suicide attempt. reports her  will be changing passcodes to the safes that contain the guns at home.  Feels since she has been in the ED, she has been able to \"breathe and process\".   She reports  thoughshe has overwhelmed in the past, denies ever having suicidal thoughts. She reports due to her health issues, a lot of her anxiety is \"health centered\". Reports a hx of panic attacks, but reports she has not had any the past 6 months.  Poor sleep chronically, has trialed trazodone in the past as it did not help. Poor appetite since the past few months. Energy levels \"not bad\". Denies any feelings of worthlessness, rest of ROS as below.     Per collateral from , he reports patient is very overwhelmed with her medical conditions, and the concern that her cancer will return. He reports she feels frustrated that her medical appointments keep getting postponed by providers, and she is frequently advised to go to the ED. \"She just wants to be able to get up and do things with us\". Feels she has been doing well MH wise until " the recent news that her cancer may have returned which has been difficult for the whole family to process. He mayte any current concerns regarding her safety. Denies any suicidality in the past. Confirms plan to change passcodes to safes. Confirms plan to follow up at [facility] tomorrow.     Psychiatric Review of Symptoms  Depression: negative, sleep disturbance: frequent awakening, and changes in appetite or weight leading to significant weight loss or gain unintentionally   Anxiety: excessive worry that is difficult to control, restlessness or feeling keyed up or on edge, difficulty concentrating, fatigue, irritability, worries of looming dangers/catastrophes, and social anxiety  Sanjuana: negative  Psychosis: negative  Delirium: negative   Trauma: negative    Psychiatric History  Prior diagnoses: denies  Prior hospitalizations: denies  History of suicide attempts: denies  History of self-harm: denies  Current mental health agency: denies  Current psychiatric medications: lexapro 20mg  Past psychiatric medications: celexa, wellbutrin    Substance Use History  Tobacco: 1/2 PPD  Alcohol: none  Cannabis: none  Other substances: prescription opiates in the past, when she was prescribed the medications for lupus.   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 Patients  confirms that he will be changing passwords to the safes.     Past Medical History  Medical History[1]     Past Surgical History  Surgical History[2]  "    Family History  Family History[3]       Allergies  Hydroxychloroquine, Amoxicillin, Humira [adalimumab], Methotrexate, Adhesive, Adhesive tape-silicones, Caffeine, Hydromorphone, Omeprazole, Penicillins, and Sulfa (sulfonamide antibiotics)    PDMP Review  Reviewed: Yes  Comments: no concerns    OBJECTIVE  Vitals  Blood pressure 146/81, pulse 74, temperature 36.9 °C (98.4 °F), temperature source Oral, resp. rate 18, height 1.6 m (5' 3\"), weight 76.5 kg (168 lb 10.4 oz), SpO2 100%.    Mental Status Exam  General/Appearance: no distress, appears stated age, well kept  Attitude/Behavior: Cooperative, conversant, engaged, and with good eye contact.  Motor Activity: no psychomotor agitation or retardation, no tremor or other abnormal movements, gait: did not assess  Mood: \"pretty good now\"  Affect: Euthymic  Speech: Spontaneous, Coherent, Appropriate volume, Appropriate rate, and Appropriate quantity  Thought Process: linear, goal directed  Thought Content: no suicidal ideation, delusions:none   Thought Perception: no perceptual abnormalities noted  Cognition: grossly intact  Insight: fair  Judgment: fair    Home Medications  Medication Documentation Review Audit       Reviewed by HAN Davis (Nurse Practitioner) on 25 at 0807      Medication Order Taking? Sig Documenting Provider Last Dose Status   Ajovy Autoinjector 225 mg/1.5 mL auto-injector [id number] No Inject 1 Pen (225 mg) under the skin every 30 (thirty) days. Historical Provider, MD Taking Active   albuterol 90 mcg/actuation inhaler [id number]  Inhale 2 puffs every 4 hours if needed for wheezing. Hermilo Cardoso PA-C   25 2359     Discontinued 25 0754     Discontinued 25 0754   cloNIDine (Catapres) 0.2 mg tablet [id number]  Take 1 tablet (0.2 mg) by mouth 2 times a day. HAN Davis  Active   cyanocobalamin (Vitamin B-12) 100 mcg tablet [id number] No Take 1 tablet (100 mcg) by mouth once daily. Historical " MD Pastora Taking Active   docusate sodium (Colace) 100 mg capsule [id number] No Take 1 capsule (100 mg) by mouth 2 times a day as needed for constipation (constipation). Jolly Conteh MD Taking Active   escitalopram (Lexapro) 20 mg tablet [id number]  Take 1 tablet (20 mg) by mouth once daily. HAN Davis  Active   famotidine (Pepcid) 20 mg tablet [id number]  Take 1 tablet (20 mg) by mouth 2 times a day. Jolly Conteh MD  Active   famotidine (Pepcid) 20 mg tablet [id number]  Take 1 tablet (20 mg) by mouth 2 times a day. HAN Baker  Active   fluconazole (Diflucan) 150 mg tablet [id number]  Monday, Wednesday, Friday as needed for yeast/thrush infection. Jolly Conteh MD  Active   folic acid (Folvite) 1 mg tablet [id number]  Take one daily except the day of methotrexate. Jolly Conteh MD  Active   FreeStyle Davy 3 Sensor device [id number]  Please change sensor every 14 days. Jolly Conteh MD  Active   hydrALAZINE (Apresoline) 25 mg tablet [id number]  Take 1 tablet (25 mg) by mouth 2 times a day as needed (SBP >150 or DBP >90). HAN Davis  Active   insulin syringe-needle U-100 (Advocate Syringes) 1 mL 30 gauge x 5/16 syringe [id number]  1 each every 7 days. Jolly Conteh MD  Active   levothyroxine (Synthroid) 137 mcg tablet [id number]  Take 1 tablet (137 mcg) by mouth early in the morning.. HAN Davis  Active   lisinopril 20 mg tablet [id number]  Take 2 tablets (40 mg) by mouth once daily. HAN Davis  Active   melatonin 10 mg capsule [id number] No Take 1 capsule (10 mg) by mouth once daily at bedtime. Historical MD Pastora Taking Active   methotrexate 25 mg/mL [id number]  Inject 0.6 mL (15 mg) under the skin every 7 days. Jolly Conteh MD  Active   methylPREDNISolone (Medrol Dospak) 4 mg tablets [id number]  Follow schedule on package instructions Jolly Conteh MD  Active   metoprolol succinate XL  "(Toprol-XL) 25 mg 24 hr tablet [id number]  Take 1 tablet (25 mg) by mouth once daily. MAYELIN Davis-CNP  Active   naloxone (Narcan) 4 mg/0.1 mL nasal spray [id number] No May repeat in 2-3 minutes if needed, alternating nostrils. Jolly Conteh MD Taking Active   Nurtec ODT 75 mg tablet,disintegrating [id number]  DISSOLVE 1 TABLET ON THE TONGUE DAILY AS NEEDED Historical Provider, MD  Active   oxyCODONE-acetaminophen (Percocet) 7.5-325 mg tablet [id number]  Take 1 tablet by mouth every 4 hours if needed for severe pain (7 - 10) or moderate pain (4 - 6). Jolly Conteh MD  Active   promethazine (Phenergan) 25 mg tablet [id number]  Take 1 tablet (25 mg) by mouth 3 times a day. Cheryl Song, DO  Active   syringe with needle, safety (Monoject Safety Syringes) 3 mL 25 gauge x 5/8\" syringe [id number]  1 each every 7 days. Jolly Conteh MD  Active   tiZANidine (Zanaflex) 2 mg tablet [id number] No Take 1 tablet (2 mg) by mouth every 12 hours. Historical Provider, MD Taking Active   traZODone (Desyrel) 50 mg tablet [id number]  Take 3 tablets (150 mg) by mouth as needed at bedtime for sleep. Jolly Conteh MD  Active   traZODone (Desyrel) 50 mg tablet [id number]  Take 3 tablets (150 mg) by mouth as needed at bedtime for sleep. MAYELIN Baker-CNP  Active                     Current Medications  Scheduled Medications[4]  Continuous Medications[5]  PRN Medications[6]     Labs  Results for orders placed or performed during the hospital encounter of 07/12/25 (from the past 24 hours)   Alcohol   Result Value Ref Range    Alcohol <10 <=10 mg/dL   Comprehensive Metabolic Panel   Result Value Ref Range    Glucose 103 (H) 74 - 99 mg/dL    Sodium 136 136 - 145 mmol/L    Potassium 3.2 (L) 3.5 - 5.3 mmol/L    Chloride 105 98 - 107 mmol/L    Bicarbonate 20 (L) 21 - 32 mmol/L    Anion Gap 14 10 - 20 mmol/L    Urea Nitrogen 13 6 - 23 mg/dL    Creatinine 0.64 0.50 - 1.05 mg/dL    eGFR >90 >60 " mL/min/1.73m*2    Calcium 9.1 8.6 - 10.3 mg/dL    Albumin 4.2 3.4 - 5.0 g/dL    Alkaline Phosphatase 35 33 - 110 U/L    Total Protein 6.9 6.4 - 8.2 g/dL    AST 10 9 - 39 U/L    Bilirubin, Total 0.5 0.0 - 1.2 mg/dL    ALT 12 7 - 45 U/L   CBC and Auto Differential   Result Value Ref Range    WBC 7.9 4.4 - 11.3 x10*3/uL    nRBC 0.0 0.0 - 0.0 /100 WBCs    RBC 3.72 (L) 4.00 - 5.20 x10*6/uL    Hemoglobin 13.0 12.0 - 16.0 g/dL    Hematocrit 37.2 36.0 - 46.0 %     80 - 100 fL    MCH 34.9 (H) 26.0 - 34.0 pg    MCHC 34.9 32.0 - 36.0 g/dL    RDW 15.0 (H) 11.5 - 14.5 %    Platelets 320 150 - 450 x10*3/uL    Neutrophils % 78.9 40.0 - 80.0 %    Immature Granulocytes %, Automated 0.3 0.0 - 0.9 %    Lymphocytes % 16.0 13.0 - 44.0 %    Monocytes % 4.2 2.0 - 10.0 %    Eosinophils % 0.3 0.0 - 6.0 %    Basophils % 0.3 0.0 - 2.0 %    Neutrophils Absolute 6.28 1.20 - 7.70 x10*3/uL    Immature Granulocytes Absolute, Automated 0.02 0.00 - 0.70 x10*3/uL    Lymphocytes Absolute 1.27 1.20 - 4.80 x10*3/uL    Monocytes Absolute 0.33 0.10 - 1.00 x10*3/uL    Eosinophils Absolute 0.02 0.00 - 0.70 x10*3/uL    Basophils Absolute 0.02 0.00 - 0.10 x10*3/uL   TSH with reflex to Free T4 if abnormal   Result Value Ref Range    Thyroid Stimulating Hormone 1.17 0.44 - 3.98 mIU/L   DRUG SCREEN,URINE   Result Value Ref Range    Amphetamine Screen, Urine Presumptive Negative Presumptive Negative    Barbiturate Screen, Urine Presumptive Negative Presumptive Negative    Benzodiazepines Screen, Urine Presumptive Negative Presumptive Negative    Cannabinoid Screen, Urine Presumptive Negative Presumptive Negative    Cocaine Metabolite Screen, Urine Presumptive Negative Presumptive Negative    Fentanyl Screen, Urine Presumptive Negative Presumptive Negative    Opiate Screen, Urine Presumptive Negative Presumptive Negative    Oxycodone Screen, Urine Presumptive Negative Presumptive Negative    PCP Screen, Urine Presumptive Negative Presumptive Negative     Methadone Screen, Urine Presumptive Negative Presumptive Negative   Urinalysis with Reflex Culture and Microscopic   Result Value Ref Range    Color, Urine Yellow Light-Yellow, Yellow, Dark-Yellow    Appearance, Urine Clear Clear    Specific Gravity, Urine 1.028 1.005 - 1.035    pH, Urine 6.5 5.0, 5.5, 6.0, 6.5, 7.0, 7.5, 8.0    Protein, Urine 20 (TRACE) NEGATIVE, 10 (TRACE), 20 (TRACE) mg/dL    Glucose, Urine Normal Normal mg/dL    Blood, Urine NEGATIVE NEGATIVE mg/dL    Ketones, Urine 150 (4+) (A) NEGATIVE mg/dL    Bilirubin, Urine 0.5 (1+) (A) NEGATIVE mg/dL    Urobilinogen, Urine 4 (2+) (A) Normal mg/dL    Nitrite, Urine NEGATIVE NEGATIVE    Leukocyte Esterase, Urine NEGATIVE NEGATIVE   hCG, Urine, Qualitative   Result Value Ref Range    HCG, Urine NEGATIVE NEGATIVE   Urinalysis Microscopic   Result Value Ref Range    WBC, Urine 1-5 1-5, NONE /HPF    RBC, Urine 6-10 (A) NONE, 1-2, 3-5 /HPF    Squamous Epithelial Cells, Urine 1-9 (SPARSE) Reference range not established. /HPF    Bacteria, Urine 1+ (A) NONE SEEN /HPF    Mucus, Urine 2+ Reference range not established. /LPF     *Note: Due to a large number of results and/or encounters for the requested time period, some results have not been displayed. A complete set of results can be found in Results Review.        Imaging  No CT head results found for the past 14 days   No MRI head results found for the past 14 days     No results found for this or any previous visit (from the past 4464 hours).    PSYCHIATRIC RISK ASSESSMENT  Violence Risk Factors:  access to weapons, unemployed, and stress/destabilizers  Acute Risk of Harm to Others is Considered: Low  Suicide Risk Factors: ; /[place] native, chronic medical illness, life crisis (shame/despair), feelings of hopelessness, and anxious ruminations  Protective Factors: Episcopal affiliation/spirituality, strong coping skills, fear of suicide or death, fear of social disapproval, sense of  responsibility towards family, social support/connectedness, moral objections to suicide, child-related concerns/living with child < 18 yrs age, positive family relationships, hopefulness/future-orientation, and marriage/partnership  Acute Risk of Harm to Self is Considered: Low    Medication Consent  Medication Consent: n/a; consult service         [1]   Past Medical History:  Diagnosis Date    Anemia 2002    Anxiety 2016    Arthritis 2016    Depression Not currently an issue    Disease of thyroid gland 2017?    Exertional shortness of breath 06/07/2023    Foot swelling 06/07/2023    GERD (gastroesophageal reflux disease) 2020    Headache 2016    History of cholecystectomy 01/19/2024    History of gestational diabetes mellitus (GDM) 01/19/2024    Hyperglycemia 01/19/2024    Hypertension 2024    Kidney stones     Lupus     dx 09/2013    Mastodynia 06/07/2023    Neuromuscular disorder (Multi) 2016    Other chest pain 01/10/2014    Atypical chest pain    Other conditions influencing health status 04/18/2016    Skin Lesion    Other muscle spasm 02/19/2016    Spasm of cervical paraspinous muscle    Pain in unspecified ankle and joints of unspecified foot 01/27/2015    Ankle joint pain    Pain in unspecified limb 07/29/2016    Limb pain    Pain in unspecified upper arm 07/29/2016    Pain in axilla    Pancreatitis ([facility]) 01/19/2024    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    Personal history of diseases of the skin and subcutaneous tissue 02/20/2014    History of urticaria    Personal history of other diseases of the musculoskeletal system and connective tissue 12/31/2013    History of low back pain    Personal history of other diseases of the nervous system and sense organs 02/19/2016    History of tension headache    Personal history of other diseases of the respiratory system 02/11/2014    History of sinusitis    Personal history of other endocrine, nutritional and metabolic disease 01/27/2015     History of vitamin D deficiency    Personal history of other endocrine, nutritional and metabolic disease 2016    History of Hashimoto thyroiditis    Personal history of other endocrine, nutritional and metabolic disease 2016    History of hypokalemia    Personal history of other infectious and parasitic diseases 2016    History of herpes zoster    Personal history of other specified conditions 2014    History of urinary frequency    Personal history of other specified conditions 2021    History of abdominal pain    Personal history of other specified conditions 2021    History of nausea    Personal history of urinary (tract) infections 2014    History of urinary tract infection    Right upper quadrant pain 2013    Abdominal pain, RUQ (right upper quadrant)    Sleep disorder 2023    Strain of muscle, fascia and tendon at neck level, initial encounter 2016    Cervical strain    Swelling of both hands 2023    Tachycardia     Thyroid cancer (Multi)    [2]   Past Surgical History:  Procedure Laterality Date    BLADDER SUSPENSION  2021    bladder sling     SECTION, CLASSIC  06/27/2013    x3     SECTION, LOW TRANSVERSE  , ,     CHOLECYSTECTOMY  2013    Cholecystectomy Laparoscopic    COLONOSCOPY  2021    GALLBLADDER SURGERY  2016    Gallbladder Surgery    HYSTERECTOMY  2022    OTHER SURGICAL HISTORY      GASTRO POLYPS REMOVED    OTHER SURGICAL HISTORY  2021    ABLATION    TOTAL THYROIDECTOMY  2016    Thyroid Surgery Total Thyroidectomy    TUBAL LIGATION  04/10/2013    Tubal Ligation   [3]   Family History  Problem Relation Name Age of Onset    Other (Other) Mother          Lichen sclerosus    Rheum arthritis Mother      Hypertension Mother      Arthritis Mother Dorothy hermosillo     Atrial fibrillation Mother Dorothy hermosillo     Anesthesia related problems Mother Dorothy hermosillo     Alcohol  abuse Maternal Grandfather Chino Bates     Cancer Maternal Grandmother Madelyn Bates     Diabetes Maternal Grandmother Madelyn Bates     Colon cancer Maternal Grandmother Madelyn Bates 70 - 79    Drug abuse Mother's Brother Ricardo Paulo    [4] [5] [6]

## 2025-07-13 LAB
THYROGLOB AB SERPL-ACNC: 4 IU/ML
THYROGLOB SERPL-MCNC: <0.4 NG/ML

## 2025-07-13 NOTE — DISCHARGE INSTRUCTIONS
You are seen today for anxiety and depression.  Please follow-up with the resources given to you by the psychiatric team.  Please return to the ER if any worsening symptoms.

## 2025-07-14 LAB — HOLD SPECIMEN: NORMAL

## 2025-07-16 LAB
ATRIAL RATE: 78 BPM
P AXIS: 44 DEGREES
P OFFSET: 179 MS
P ONSET: 117 MS
PR INTERVAL: 184 MS
Q ONSET: 209 MS
QRS COUNT: 13 BEATS
QRS DURATION: 98 MS
QT INTERVAL: 392 MS
QTC CALCULATION(BAZETT): 446 MS
QTC FREDERICIA: 427 MS
R AXIS: 17 DEGREES
T AXIS: 36 DEGREES
T OFFSET: 405 MS
VENTRICULAR RATE: 78 BPM

## 2025-07-17 DIAGNOSIS — G89.29 OTHER CHRONIC PAIN: ICD-10-CM

## 2025-07-17 DIAGNOSIS — M06.09 POLYARTHRITIS WITH NEGATIVE RHEUMATOID FACTOR (MULTI): ICD-10-CM

## 2025-07-17 RX ORDER — OXYCODONE AND ACETAMINOPHEN 7.5; 325 MG/1; MG/1
1 TABLET ORAL EVERY 4 HOURS PRN
Qty: 112 TABLET | Refills: 0 | Status: SHIPPED | OUTPATIENT
Start: 2025-07-17

## 2025-07-17 NOTE — TELEPHONE ENCOUNTER
PER LEONOR MESSAGE:    I am due for a refill on my Percocet 7.5/325. To the [facility] in [place]. Thank you

## 2025-07-22 DIAGNOSIS — Z85.850 HISTORY OF THYROID CANCER: Primary | ICD-10-CM

## 2025-07-22 DIAGNOSIS — R59.0 ENLARGED LYMPH NODE IN NECK: ICD-10-CM

## 2025-07-22 DIAGNOSIS — R76.8 THYROGLOBULIN ANTIBODY POSITIVE: ICD-10-CM

## 2025-07-22 NOTE — PROGRESS NOTES
Spoke to pt. I did a peer to peer. The ins plan wants a thyroid uptake scan to assess for return of thyroid cancer.     Cancel the PET/CT.     Pt aware and will call to schedule the test

## 2025-07-28 ENCOUNTER — HOSPITAL ENCOUNTER (OUTPATIENT)
Dept: RADIOLOGY | Facility: HOSPITAL | Age: 40
End: 2025-07-28
Payer: COMMERCIAL

## 2025-07-28 ENCOUNTER — APPOINTMENT (OUTPATIENT)
Dept: RADIOLOGY | Facility: HOSPITAL | Age: 40
End: 2025-07-28
Payer: COMMERCIAL

## 2025-07-31 ENCOUNTER — PATIENT MESSAGE (OUTPATIENT)
Dept: PRIMARY CARE | Facility: CLINIC | Age: 40
End: 2025-07-31
Payer: COMMERCIAL

## 2025-07-31 DIAGNOSIS — R11.0 NAUSEA: ICD-10-CM

## 2025-07-31 DIAGNOSIS — I10 PRIMARY HYPERTENSION: ICD-10-CM

## 2025-07-31 RX ORDER — PROMETHAZINE HYDROCHLORIDE 25 MG/1
25 TABLET ORAL 3 TIMES DAILY
Qty: 90 TABLET | Refills: 1 | Status: SHIPPED | OUTPATIENT
Start: 2025-07-31

## 2025-07-31 RX ORDER — LISINOPRIL 20 MG/1
40 TABLET ORAL DAILY
Qty: 180 TABLET | Refills: 1 | Status: SHIPPED | OUTPATIENT
Start: 2025-07-31

## 2025-08-01 ENCOUNTER — PATIENT MESSAGE (OUTPATIENT)
Facility: CLINIC | Age: 40
End: 2025-08-01
Payer: COMMERCIAL

## 2025-08-01 ENCOUNTER — HOSPITAL ENCOUNTER (EMERGENCY)
Facility: HOSPITAL | Age: 40
Discharge: HOME | End: 2025-08-01
Attending: EMERGENCY MEDICINE
Payer: COMMERCIAL

## 2025-08-01 ENCOUNTER — APPOINTMENT (OUTPATIENT)
Dept: RADIOLOGY | Facility: HOSPITAL | Age: 40
End: 2025-08-01
Payer: COMMERCIAL

## 2025-08-01 VITALS
BODY MASS INDEX: 29.23 KG/M2 | DIASTOLIC BLOOD PRESSURE: 86 MMHG | WEIGHT: 165 LBS | RESPIRATION RATE: 16 BRPM | TEMPERATURE: 98.1 F | HEIGHT: 63 IN | HEART RATE: 62 BPM | SYSTOLIC BLOOD PRESSURE: 160 MMHG | OXYGEN SATURATION: 97 %

## 2025-08-01 DIAGNOSIS — G89.29 OTHER CHRONIC PAIN: ICD-10-CM

## 2025-08-01 DIAGNOSIS — N83.202 CYST OF LEFT OVARY: Primary | ICD-10-CM

## 2025-08-01 DIAGNOSIS — M06.09 POLYARTHRITIS WITH NEGATIVE RHEUMATOID FACTOR (MULTI): ICD-10-CM

## 2025-08-01 LAB
ALBUMIN SERPL BCP-MCNC: 4.3 G/DL (ref 3.4–5)
ALP SERPL-CCNC: 38 U/L (ref 33–110)
ALT SERPL W P-5'-P-CCNC: 15 U/L (ref 7–45)
ANION GAP SERPL CALC-SCNC: 10 MMOL/L (ref 10–20)
APPEARANCE UR: CLEAR
AST SERPL W P-5'-P-CCNC: 8 U/L (ref 9–39)
BASOPHILS # BLD AUTO: 0.01 X10*3/UL (ref 0–0.1)
BASOPHILS NFR BLD AUTO: 0.1 %
BILIRUB SERPL-MCNC: 0.5 MG/DL (ref 0–1.2)
BILIRUB UR STRIP.AUTO-MCNC: NEGATIVE MG/DL
BUN SERPL-MCNC: 13 MG/DL (ref 6–23)
CALCIUM SERPL-MCNC: 9.5 MG/DL (ref 8.6–10.3)
CHLORIDE SERPL-SCNC: 104 MMOL/L (ref 98–107)
CO2 SERPL-SCNC: 27 MMOL/L (ref 21–32)
COLOR UR: YELLOW
CREAT SERPL-MCNC: 0.62 MG/DL (ref 0.5–1.05)
EGFRCR SERPLBLD CKD-EPI 2021: >90 ML/MIN/1.73M*2
EOSINOPHIL # BLD AUTO: 0.03 X10*3/UL (ref 0–0.7)
EOSINOPHIL NFR BLD AUTO: 0.4 %
ERYTHROCYTE [DISTWIDTH] IN BLOOD BY AUTOMATED COUNT: 14.1 % (ref 11.5–14.5)
GLUCOSE SERPL-MCNC: 86 MG/DL (ref 74–99)
GLUCOSE UR STRIP.AUTO-MCNC: NORMAL MG/DL
HCT VFR BLD AUTO: 38.5 % (ref 36–46)
HGB BLD-MCNC: 13.6 G/DL (ref 12–16)
IMM GRANULOCYTES # BLD AUTO: 0.01 X10*3/UL (ref 0–0.7)
IMM GRANULOCYTES NFR BLD AUTO: 0.1 % (ref 0–0.9)
KETONES UR STRIP.AUTO-MCNC: ABNORMAL MG/DL
LEUKOCYTE ESTERASE UR QL STRIP.AUTO: NEGATIVE
LYMPHOCYTES # BLD AUTO: 2.34 X10*3/UL (ref 1.2–4.8)
LYMPHOCYTES NFR BLD AUTO: 29.4 %
MCH RBC QN AUTO: 35.2 PG (ref 26–34)
MCHC RBC AUTO-ENTMCNC: 35.3 G/DL (ref 32–36)
MCV RBC AUTO: 100 FL (ref 80–100)
MONOCYTES # BLD AUTO: 0.59 X10*3/UL (ref 0.1–1)
MONOCYTES NFR BLD AUTO: 7.4 %
MUCOUS THREADS #/AREA URNS AUTO: ABNORMAL /LPF
NEUTROPHILS # BLD AUTO: 4.98 X10*3/UL (ref 1.2–7.7)
NEUTROPHILS NFR BLD AUTO: 62.6 %
NITRITE UR QL STRIP.AUTO: NEGATIVE
NRBC BLD-RTO: 0 /100 WBCS (ref 0–0)
PH UR STRIP.AUTO: 6.5 [PH]
PLATELET # BLD AUTO: 351 X10*3/UL (ref 150–450)
POTASSIUM SERPL-SCNC: 3.1 MMOL/L (ref 3.5–5.3)
PROT SERPL-MCNC: 7 G/DL (ref 6.4–8.2)
PROT UR STRIP.AUTO-MCNC: ABNORMAL MG/DL
RBC # BLD AUTO: 3.86 X10*6/UL (ref 4–5.2)
RBC # UR STRIP.AUTO: ABNORMAL MG/DL
RBC #/AREA URNS AUTO: ABNORMAL /HPF
SODIUM SERPL-SCNC: 138 MMOL/L (ref 136–145)
SP GR UR STRIP.AUTO: 1.02
SQUAMOUS #/AREA URNS AUTO: ABNORMAL /HPF
UROBILINOGEN UR STRIP.AUTO-MCNC: ABNORMAL MG/DL
WBC # BLD AUTO: 8 X10*3/UL (ref 4.4–11.3)
WBC #/AREA URNS AUTO: ABNORMAL /HPF

## 2025-08-01 PROCEDURE — 85025 COMPLETE CBC W/AUTO DIFF WBC: CPT

## 2025-08-01 PROCEDURE — 2500000004 HC RX 250 GENERAL PHARMACY W/ HCPCS (ALT 636 FOR OP/ED): Mod: SE

## 2025-08-01 PROCEDURE — 36415 COLL VENOUS BLD VENIPUNCTURE: CPT

## 2025-08-01 PROCEDURE — 96361 HYDRATE IV INFUSION ADD-ON: CPT

## 2025-08-01 PROCEDURE — 76830 TRANSVAGINAL US NON-OB: CPT | Performed by: RADIOLOGY

## 2025-08-01 PROCEDURE — 96374 THER/PROPH/DIAG INJ IV PUSH: CPT

## 2025-08-01 PROCEDURE — 51798 US URINE CAPACITY MEASURE: CPT

## 2025-08-01 PROCEDURE — 76856 US EXAM PELVIC COMPLETE: CPT | Performed by: RADIOLOGY

## 2025-08-01 PROCEDURE — 96375 TX/PRO/DX INJ NEW DRUG ADDON: CPT

## 2025-08-01 PROCEDURE — 76830 TRANSVAGINAL US NON-OB: CPT

## 2025-08-01 PROCEDURE — 74176 CT ABD & PELVIS W/O CONTRAST: CPT | Performed by: RADIOLOGY

## 2025-08-01 PROCEDURE — 99285 EMERGENCY DEPT VISIT HI MDM: CPT | Mod: 25 | Performed by: EMERGENCY MEDICINE

## 2025-08-01 PROCEDURE — 81001 URINALYSIS AUTO W/SCOPE: CPT

## 2025-08-01 PROCEDURE — 74176 CT ABD & PELVIS W/O CONTRAST: CPT

## 2025-08-01 PROCEDURE — 80053 COMPREHEN METABOLIC PANEL: CPT

## 2025-08-01 PROCEDURE — 2500000002 HC RX 250 W HCPCS SELF ADMINISTERED DRUGS (ALT 637 FOR MEDICARE OP, ALT 636 FOR OP/ED): Mod: SE

## 2025-08-01 RX ORDER — POTASSIUM CHLORIDE 20 MEQ/1
20 TABLET, EXTENDED RELEASE ORAL DAILY
Status: DISCONTINUED | OUTPATIENT
Start: 2025-08-01 | End: 2025-08-01 | Stop reason: HOSPADM

## 2025-08-01 RX ORDER — ONDANSETRON HYDROCHLORIDE 2 MG/ML
4 INJECTION, SOLUTION INTRAVENOUS ONCE
Status: COMPLETED | OUTPATIENT
Start: 2025-08-01 | End: 2025-08-01

## 2025-08-01 RX ORDER — OXYCODONE AND ACETAMINOPHEN 7.5; 325 MG/1; MG/1
1 TABLET ORAL EVERY 4 HOURS PRN
Qty: 112 TABLET | Refills: 0 | Status: SHIPPED | OUTPATIENT
Start: 2025-08-07

## 2025-08-01 RX ORDER — KETOROLAC TROMETHAMINE 15 MG/ML
15 INJECTION, SOLUTION INTRAMUSCULAR; INTRAVENOUS ONCE
Status: COMPLETED | OUTPATIENT
Start: 2025-08-01 | End: 2025-08-01

## 2025-08-01 RX ORDER — MORPHINE SULFATE 4 MG/ML
4 INJECTION, SOLUTION INTRAMUSCULAR; INTRAVENOUS ONCE
Status: COMPLETED | OUTPATIENT
Start: 2025-08-01 | End: 2025-08-01

## 2025-08-01 RX ADMIN — KETOROLAC TROMETHAMINE 15 MG: 15 INJECTION, SOLUTION INTRAMUSCULAR; INTRAVENOUS at 15:22

## 2025-08-01 RX ADMIN — ONDANSETRON 4 MG: 2 INJECTION INTRAMUSCULAR; INTRAVENOUS at 15:22

## 2025-08-01 RX ADMIN — MORPHINE SULFATE 4 MG: 4 INJECTION, SOLUTION INTRAMUSCULAR; INTRAVENOUS at 17:26

## 2025-08-01 RX ADMIN — POTASSIUM CHLORIDE 20 MEQ: 1500 TABLET, EXTENDED RELEASE ORAL at 16:15

## 2025-08-01 RX ADMIN — SODIUM CHLORIDE, SODIUM LACTATE, POTASSIUM CHLORIDE, AND CALCIUM CHLORIDE 1000 ML: .6; .31; .03; .02 INJECTION, SOLUTION INTRAVENOUS at 15:22

## 2025-08-01 ASSESSMENT — PAIN DESCRIPTION - PAIN TYPE
TYPE: ACUTE PAIN
TYPE: ACUTE PAIN

## 2025-08-01 ASSESSMENT — PAIN - FUNCTIONAL ASSESSMENT
PAIN_FUNCTIONAL_ASSESSMENT: 0-10

## 2025-08-01 ASSESSMENT — PAIN DESCRIPTION - LOCATION
LOCATION: BACK
LOCATION: OTHER (COMMENT)
LOCATION: BACK

## 2025-08-01 ASSESSMENT — PAIN SCALES - GENERAL
PAINLEVEL_OUTOF10: 5 - MODERATE PAIN
PAINLEVEL_OUTOF10: 2
PAINLEVEL_OUTOF10: 9
PAINLEVEL_OUTOF10: 2

## 2025-08-01 ASSESSMENT — PAIN DESCRIPTION - FREQUENCY: FREQUENCY: CONSTANT/CONTINUOUS

## 2025-08-01 ASSESSMENT — PAIN DESCRIPTION - ONSET: ONSET: ONGOING

## 2025-08-01 ASSESSMENT — PAIN DESCRIPTION - ORIENTATION
ORIENTATION: LEFT
ORIENTATION: LEFT

## 2025-08-01 ASSESSMENT — PAIN DESCRIPTION - DESCRIPTORS: DESCRIPTORS: BURNING

## 2025-08-01 ASSESSMENT — PAIN DESCRIPTION - PROGRESSION: CLINICAL_PROGRESSION: GRADUALLY IMPROVING

## 2025-08-01 NOTE — ED PROVIDER NOTES
HPI   Chief Complaint   Patient presents with    Flank Pain       Patient is a 40-year-old female with significant history of lupus Sjogren's, kidney stones presents to the ED for left flank pain that is rating to her left groin times 8 AM this morning.  Patient states it is a constant dull pain and then becomes more severe.  Patient denies any injuries to the area.  Patient has had a hysterectomy.  Patient has had kidney stones before and states this feels similar.  Patient does not currently follow with a urologist.  Patient denies any numbness tingling.  Patient states she has had some diarrhea denies any nausea or vomiting.  Patient denies any fevers chest pain dysuria or hematuria.  Smokes 1 pack/day denies any alcohol or street drug abuse.              Patient History   Medical History[1]  Surgical History[2]  Family History[3]  Social History[4]    Physical Exam   ED Triage Vitals [08/01/25 1433]   Temperature Heart Rate Respirations BP   36.4 °C (97.5 °F) 78 18 (!) 148/91      Pulse Ox Temp Source Heart Rate Source Patient Position   97 % Temporal -- --      BP Location FiO2 (%)     -- --       Physical Exam    Cardiovascular:      Rate and Rhythm: Normal rate and regular rhythm.      Pulses: Normal pulses.   Pulmonary:      Effort: Pulmonary effort is normal.      Breath sounds: No wheezing, rhonchi or rales.   Abdominal:      Palpations: Abdomen is soft.      Tenderness: There is no abdominal tenderness. There is left CVA tenderness. There is no guarding or rebound.     Musculoskeletal:         General: No tenderness. Normal range of motion.      Comments: No mid spinous or paraspinous tenderness     Neurological:      Mental Status: She is alert.           ED Course & MDM   ED Course as of 08/01/25 2001   Fri Aug 01, 2025   1730 Calcium: 9.5 [BT]   1759 Spoke to Dr. Coronado who looked at the imaging who does not believe this is ovarian torsion.  States patient does not need to be transferred for urgent  surgery.  States patient can just follow-up with her OB/GYN obviously if she has any worsening pain return to the ER.  Patient states she should have a repeat ultrasound in a month []      ED Course User Index  [BT] Justin Colmenares DO  [] Marie Sousa PA-C         Diagnoses as of 08/01/25 2001   Cyst of left ovary                 No data recorded                                 Medical Decision Making  Medical Decision Making:  Patient presented as described in HPI. Patient case including ROS, PE, and treatment and plan discussed with ED attending if attached as cosigner. Due to patients presentation orders completed include as documented.  Patient presents to the ED for left-sided flank pain times this morning.  Patient given fluids Zofran and Toradol.  Pending labs and imaging.  Labs are unremarkable imaging shows septated complex lesion replacing the left ovary without interval vascularity.  Given lack of adjacent fluid and atypical appearance torsion is considered less likely however torsion or intermittent torsion is not excluded given scarcity of vascularity in the visualized left ovarian tissue and the mass potentially serving as a potential nidus for torsion.  I did speak with Dr. Coronado OB/GYN who examined the images does not believe this is a torsion states patient can follow-up with her OB/GYN and recommends repeat ultrasound in a month.  Patient educated on strict return precautions any worsening symptoms to return.  Patient educated close follow-up with OB/GYN.  Patient educated ibuprofen Tylenol for home.  Patient timothy stable on discharge.  Patient was advised to follow up with PCP or recommended provider in 2-3 days for another evaluation and exam. I advised patient/guardian to return or go to closest emergency room immediately if symptoms change, get worse, new symptoms develop prior to follow up. If there is no improvement in symptoms in the next 24 hours they are advised to return  for further evaluation and exam. I also explained the plan and treatment course. Patient/guardian is in agreement with plan, treatment course, and follow up and states verbally that they will comply.        Patient care discussed with: N/A  Social Determinants affecting care: N/A    Final diagnosis and disposition as below.  See CI    Homegoing. I discussed the differential; results and discharge plan with the patient and/or family/friend/caregiver if present.  I emphasized the importance of follow-up with the physician I referred them to in the timeframe recommended.  I explained reasons for the patient to return to the Emergency Department. They agreed that if they feel their condition is worsening or if they have any other concern they should call 911 immediately for further assistance. I gave the patient an opportunity to ask all questions they had and answered all of them accordingly. They understand return precautions and discharge instructions. The patient and/or family/friend/caregiver expressed understanding verbally and that they would comply.       Disposition:  Discharge      This note has been transcribed using voice recognition and may contain grammatical errors, misplaced words, incorrect words, incorrect phrases or other errors.        US PELVIS TRANSABDOMINAL WITH TRANSVAGINAL   Final Result   Septated complex lesion replacing the left ovary without internal   vascularity. Given lack of adjacent fluid and atypical appearance   torsion is considered less likely however torsion or intermittent   torsion is not excluded given scarcity of vascularity in the   visualized left ovarian tissue and the mass potentially serving as a   potential nidus for torsion. Gynecological consult and follow-up   recommended.        Status post hysterectomy        MACRO:   Berna Vann discussed the significance and urgency of this   critical finding by EPIC secure chat with  MJ BANKS on   8/1/2025 at 5:43  pm.  (**-RCF-**) Findings:  See findings.                  Signed by: Berna Vann 8/1/2025 5:43 PM   Dictation workstation:   [id number]      CT abdomen pelvis wo IV contrast   Final Result   1.  Right nephrolithiasis without obstructive uropathy.   2. Left renal cyst appearing benign. No stones in the left renal   collecting system.   3. Left adnexal cyst as described may warrant additional imaging with   ultrasound depending on the patient's clinical presentation.             MACRO:   None        Signed by: Navi Grewal 8/1/2025 4:17 PM   Dictation workstation:   [id number]         Labs Reviewed   CBC WITH AUTO DIFFERENTIAL - Abnormal       Result Value    WBC 8.0      nRBC 0.0      RBC 3.86 (*)     Hemoglobin 13.6      Hematocrit 38.5            MCH 35.2 (*)     MCHC 35.3      RDW 14.1      Platelets 351      Neutrophils % 62.6      Immature Granulocytes %, Automated 0.1      Lymphocytes % 29.4      Monocytes % 7.4      Eosinophils % 0.4      Basophils % 0.1      Neutrophils Absolute 4.98      Immature Granulocytes Absolute, Automated 0.01      Lymphocytes Absolute 2.34      Monocytes Absolute 0.59      Eosinophils Absolute 0.03      Basophils Absolute 0.01     COMPREHENSIVE METABOLIC PANEL - Abnormal    Glucose 86      Sodium 138      Potassium 3.1 (*)     Chloride 104      Bicarbonate 27      Anion Gap 10      Urea Nitrogen 13      Creatinine 0.62      eGFR >90      Calcium 9.5      Albumin 4.3      Alkaline Phosphatase 38      Total Protein 7.0      AST 8 (*)     Bilirubin, Total 0.5      ALT 15     URINALYSIS WITH REFLEX CULTURE AND MICROSCOPIC - Abnormal    Color, Urine Yellow      Appearance, Urine Clear      Specific Gravity, Urine 1.021      pH, Urine 6.5      Protein, Urine 10 (TRACE)      Glucose, Urine Normal      Blood, Urine 0.03 (TRACE) (*)     Ketones, Urine TRACE (*)     Bilirubin, Urine NEGATIVE      Urobilinogen, Urine 2 (1+) (*)     Nitrite, Urine NEGATIVE      Leukocyte  Esterase, Urine NEGATIVE     URINALYSIS MICROSCOPIC WITH REFLEX CULTURE - Abnormal    WBC, Urine 1-5      RBC, Urine 11-20 (*)     Squamous Epithelial Cells, Urine 1-9 (SPARSE)      Mucus, Urine 4+     URINALYSIS WITH REFLEX CULTURE AND MICROSCOPIC    Narrative:     The following orders were created for panel order Urinalysis with Reflex Culture and Microscopic.  Procedure                               Abnormality         Status                     ---------                               -----------         ------                     Urinalysis with Reflex C...[[id number]]  Abnormal            Final result               Extra Urine Gray Tube[[id number]]                            In process                   Please view results for these tests on the individual orders.   EXTRA URINE GRAY TUBE      Procedure  Procedures       [1]   Past Medical History:  Diagnosis Date    Anemia 2002    Anxiety 2016    Arthritis 2016    Depression Not currently an issue    Disease of thyroid gland 2017?    Exertional shortness of breath 06/07/2023    Foot swelling 06/07/2023    GERD (gastroesophageal reflux disease) 2020    Headache 2016    History of cholecystectomy 01/19/2024    History of gestational diabetes mellitus (GDM) 01/19/2024    Hyperglycemia 01/19/2024    Hypertension 2024    Kidney stones     Lupus     dx 09/2013    Mastodynia 06/07/2023    Neuromuscular disorder (Multi) 2016    Other chest pain 01/10/2014    Atypical chest pain    Other conditions influencing health status 04/18/2016    Skin Lesion    Other muscle spasm 02/19/2016    Spasm of cervical paraspinous muscle    Pain in unspecified ankle and joints of unspecified foot 01/27/2015    Ankle joint pain    Pain in unspecified limb 07/29/2016    Limb pain    Pain in unspecified upper arm 07/29/2016    Pain in axilla    Pancreatitis ([facility]) 01/19/2024    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    Personal history of diseases of the skin and  subcutaneous tissue 2014    History of urticaria    Personal history of other diseases of the musculoskeletal system and connective tissue 2013    History of low back pain    Personal history of other diseases of the nervous system and sense organs 2016    History of tension headache    Personal history of other diseases of the respiratory system 2014    History of sinusitis    Personal history of other endocrine, nutritional and metabolic disease 2015    History of vitamin D deficiency    Personal history of other endocrine, nutritional and metabolic disease 2016    History of Hashimoto thyroiditis    Personal history of other endocrine, nutritional and metabolic disease 2016    History of hypokalemia    Personal history of other infectious and parasitic diseases 2016    History of herpes zoster    Personal history of other specified conditions 2014    History of urinary frequency    Personal history of other specified conditions 2021    History of abdominal pain    Personal history of other specified conditions 2021    History of nausea    Personal history of urinary (tract) infections 2014    History of urinary tract infection    Right upper quadrant pain 2013    Abdominal pain, RUQ (right upper quadrant)    Sleep disorder 2023    Strain of muscle, fascia and tendon at neck level, initial encounter 2016    Cervical strain    Swelling of both hands 2023    Tachycardia     Thyroid cancer (Multi)    [2]   Past Surgical History:  Procedure Laterality Date    BLADDER SUSPENSION  2021    bladder sling     SECTION, CLASSIC  06/27/2013    x3     SECTION, LOW TRANSVERSE  , ,     CHOLECYSTECTOMY  2013    Cholecystectomy Laparoscopic    COLONOSCOPY  2021    GALLBLADDER SURGERY  2016    Gallbladder Surgery    HYSTERECTOMY  2022    OTHER SURGICAL HISTORY      GASTRO POLYPS REMOVED     OTHER SURGICAL HISTORY  02/2021    ABLATION    TOTAL THYROIDECTOMY  04/29/2016    Thyroid Surgery Total Thyroidectomy    TUBAL LIGATION  04/10/2013    Tubal Ligation   [3]   Family History  Problem Relation Name Age of Onset    Other (Other) Mother          Lichen sclerosus    Rheum arthritis Mother      Hypertension Mother      Arthritis Mother Dorothy hermosillo     Atrial fibrillation Mother Dorothy hermosillo     Anesthesia related problems Mother Dorothy hermosillo     Alcohol abuse Maternal Grandfather Chino Bates     Cancer Maternal Grandmother Madelyn Bates     Diabetes Maternal Grandmother Madelyn Bates     Colon cancer Maternal Grandmother Madelyn Bates 70 - 79    Drug abuse Mother's Brother Ricardo Bates    [4]   Social History  Tobacco Use    Smoking status: Every Day     Current packs/day: 1.00     Average packs/day: 1 pack/day for 24.3 years (24.3 ttl pk-yrs)     Types: Cigarettes     Start date: 4/1/2001     Passive exposure: Current    Smokeless tobacco: Never   Vaping Use    Vaping status: Former    Passive vaping exposure: Yes   Substance Use Topics    Alcohol use: Not Currently    Drug use: Never        Marie Sousa PA-C  08/01/25 2002

## 2025-08-02 LAB — HOLD SPECIMEN: NORMAL

## 2025-08-02 NOTE — DISCHARGE INSTRUCTIONS
Ultrasound showed large ovarian cyst left-sided I spoke with Dr. Coronado OB/GYN who does not believe this is an ovarian torsion.  States he can go home and follow-up with your OB/GYN make sure you follow-up closely with your OB/GYN.  Recommends outpatient repeat ultrasound.  Any worsening symptoms return to the ER

## 2025-08-04 ENCOUNTER — APPOINTMENT (OUTPATIENT)
Dept: RADIOLOGY | Facility: HOSPITAL | Age: 40
End: 2025-08-04
Payer: COMMERCIAL

## 2025-08-06 ENCOUNTER — TELEPHONE (OUTPATIENT)
Dept: PRIMARY CARE | Facility: CLINIC | Age: 40
End: 2025-08-06
Payer: COMMERCIAL

## 2025-08-07 ENCOUNTER — APPOINTMENT (OUTPATIENT)
Dept: RADIOLOGY | Facility: HOSPITAL | Age: 40
End: 2025-08-07
Payer: COMMERCIAL

## 2025-08-07 DIAGNOSIS — Z85.850 HISTORY OF THYROID CANCER: Primary | ICD-10-CM

## 2025-08-07 DIAGNOSIS — E89.0 HYPOTHYROIDISM, POSTSURGICAL: ICD-10-CM

## 2025-08-07 DIAGNOSIS — R89.9 ABNORMAL LABORATORY TEST: ICD-10-CM

## 2025-08-08 ENCOUNTER — APPOINTMENT (OUTPATIENT)
Dept: RADIOLOGY | Facility: HOSPITAL | Age: 40
End: 2025-08-08
Payer: COMMERCIAL

## 2025-08-16 ENCOUNTER — HOSPITAL ENCOUNTER (EMERGENCY)
Facility: HOSPITAL | Age: 40
Discharge: HOME | End: 2025-08-16
Payer: COMMERCIAL

## 2025-08-16 ENCOUNTER — APPOINTMENT (OUTPATIENT)
Dept: RADIOLOGY | Facility: HOSPITAL | Age: 40
End: 2025-08-16
Payer: COMMERCIAL

## 2025-08-16 ASSESSMENT — PAIN SCALES - GENERAL
PAINLEVEL_OUTOF10: 3

## 2025-08-16 ASSESSMENT — PAIN - FUNCTIONAL ASSESSMENT
PAIN_FUNCTIONAL_ASSESSMENT: 0-10
PAIN_FUNCTIONAL_ASSESSMENT: 0-10

## 2025-08-16 ASSESSMENT — PAIN DESCRIPTION - DESCRIPTORS: DESCRIPTORS: DISCOMFORT

## 2025-08-16 ASSESSMENT — PAIN DESCRIPTION - LOCATION: LOCATION: ABDOMEN

## 2025-08-16 ASSESSMENT — PAIN DESCRIPTION - ORIENTATION: ORIENTATION: MID

## 2025-08-16 ASSESSMENT — PAIN DESCRIPTION - PROGRESSION: CLINICAL_PROGRESSION: NOT CHANGED

## 2025-08-16 ASSESSMENT — PAIN DESCRIPTION - PAIN TYPE: TYPE: ACUTE PAIN

## 2025-08-18 ENCOUNTER — TELEPHONE (OUTPATIENT)
Dept: PRIMARY CARE | Facility: CLINIC | Age: 40
End: 2025-08-18
Payer: COMMERCIAL

## 2025-08-18 ENCOUNTER — TELEPHONE (OUTPATIENT)
Facility: CLINIC | Age: 40
End: 2025-08-18
Payer: COMMERCIAL

## 2025-08-18 DIAGNOSIS — R00.2 PALPITATIONS: Primary | ICD-10-CM

## 2025-08-18 RX ORDER — METOPROLOL TARTRATE 25 MG/1
25 TABLET, FILM COATED ORAL 2 TIMES DAILY
Qty: 60 TABLET | Refills: 5 | Status: SHIPPED | OUTPATIENT
Start: 2025-08-18 | End: 2026-02-14

## 2025-08-19 ENCOUNTER — APPOINTMENT (OUTPATIENT)
Dept: RADIOLOGY | Facility: HOSPITAL | Age: 40
End: 2025-08-19
Payer: COMMERCIAL

## 2025-08-20 ENCOUNTER — APPOINTMENT (OUTPATIENT)
Dept: RADIOLOGY | Facility: HOSPITAL | Age: 40
End: 2025-08-20
Payer: COMMERCIAL

## 2025-08-22 ENCOUNTER — PATIENT MESSAGE (OUTPATIENT)
Facility: CLINIC | Age: 40
End: 2025-08-22
Payer: COMMERCIAL

## 2025-08-22 DIAGNOSIS — G89.29 OTHER CHRONIC PAIN: ICD-10-CM

## 2025-08-22 DIAGNOSIS — M06.09 POLYARTHRITIS WITH NEGATIVE RHEUMATOID FACTOR (MULTI): ICD-10-CM

## 2025-08-22 RX ORDER — OXYCODONE AND ACETAMINOPHEN 7.5; 325 MG/1; MG/1
1 TABLET ORAL EVERY 4 HOURS PRN
Qty: 112 TABLET | Refills: 0 | Status: SHIPPED | OUTPATIENT
Start: 2025-08-22

## 2025-08-25 ENCOUNTER — TELEPHONE (OUTPATIENT)
Facility: CLINIC | Age: 40
End: 2025-08-25
Payer: COMMERCIAL

## 2025-09-04 ENCOUNTER — APPOINTMENT (OUTPATIENT)
Dept: PRIMARY CARE | Facility: CLINIC | Age: 40
End: 2025-09-04
Payer: COMMERCIAL

## 2025-09-04 PROBLEM — L28.2 PRURITIC RASH: Status: RESOLVED | Noted: 2024-01-19 | Resolved: 2025-09-04

## 2025-09-04 PROBLEM — Z85.850 HISTORY OF THYROID CANCER: Status: ACTIVE | Noted: 2025-09-04

## 2025-09-04 PROBLEM — N93.9 ABNORMAL UTERINE BLEEDING: Status: RESOLVED | Noted: 2025-03-05 | Resolved: 2025-09-04

## 2025-09-04 PROBLEM — N39.46 MIXED STRESS AND URGE URINARY INCONTINENCE: Status: RESOLVED | Noted: 2025-03-05 | Resolved: 2025-09-04

## 2025-09-04 ASSESSMENT — ENCOUNTER SYMPTOMS
DEPRESSION: 0
LOSS OF SENSATION IN FEET: 0
OCCASIONAL FEELINGS OF UNSTEADINESS: 0

## 2025-09-05 ENCOUNTER — TELEPHONE (OUTPATIENT)
Facility: CLINIC | Age: 40
End: 2025-09-05
Payer: COMMERCIAL